# Patient Record
Sex: MALE | Race: WHITE | Employment: UNEMPLOYED | URBAN - METROPOLITAN AREA
[De-identification: names, ages, dates, MRNs, and addresses within clinical notes are randomized per-mention and may not be internally consistent; named-entity substitution may affect disease eponyms.]

---

## 2021-08-09 ENCOUNTER — HOSPITAL ENCOUNTER (EMERGENCY)
Age: 41
Discharge: HOME | End: 2021-08-10
Attending: EMERGENCY MEDICINE
Payer: MEDICARE

## 2021-08-09 DIAGNOSIS — N30.00 ACUTE CYSTITIS WITHOUT HEMATURIA: Primary | ICD-10-CM

## 2021-08-09 LAB
ALBUMIN SERPL-MCNC: 4.1 G/DL (ref 3.5–5)
ALBUMIN/GLOB SERPL: 1 {RATIO} (ref 1.2–3.5)
ALP SERPL-CCNC: 57 U/L (ref 50–136)
ALT SERPL-CCNC: 46 U/L (ref 12–65)
ANION GAP SERPL CALC-SCNC: 10 MMOL/L (ref 7–16)
AST SERPL-CCNC: 31 U/L (ref 15–37)
BASOPHILS # BLD: 0.1 K/UL (ref 0–0.2)
BASOPHILS NFR BLD: 1 % (ref 0–2)
BILIRUB SERPL-MCNC: 0.3 MG/DL (ref 0.2–1.1)
BUN SERPL-MCNC: 19 MG/DL (ref 6–23)
CALCIUM SERPL-MCNC: 10.1 MG/DL (ref 8.3–10.4)
CHLORIDE SERPL-SCNC: 98 MMOL/L (ref 98–107)
CO2 SERPL-SCNC: 24 MMOL/L (ref 21–32)
CREAT SERPL-MCNC: 1.03 MG/DL (ref 0.8–1.5)
DIFFERENTIAL METHOD BLD: NORMAL
EOSINOPHIL # BLD: 0.2 K/UL (ref 0–0.8)
EOSINOPHIL NFR BLD: 3 % (ref 0.5–7.8)
ERYTHROCYTE [DISTWIDTH] IN BLOOD BY AUTOMATED COUNT: 14.3 % (ref 11.9–14.6)
ETHANOL SERPL-MCNC: <3 MG/DL
GLOBULIN SER CALC-MCNC: 4.2 G/DL (ref 2.3–3.5)
GLUCOSE SERPL-MCNC: 319 MG/DL (ref 65–100)
HCT VFR BLD AUTO: 46.6 % (ref 41.1–50.3)
HGB BLD-MCNC: 15.4 G/DL (ref 13.6–17.2)
IMM GRANULOCYTES # BLD AUTO: 0.1 K/UL (ref 0–0.5)
IMM GRANULOCYTES NFR BLD AUTO: 1 % (ref 0–5)
LYMPHOCYTES # BLD: 1.4 K/UL (ref 0.5–4.6)
LYMPHOCYTES NFR BLD: 18 % (ref 13–44)
MCH RBC QN AUTO: 27.5 PG (ref 26.1–32.9)
MCHC RBC AUTO-ENTMCNC: 33 G/DL (ref 31.4–35)
MCV RBC AUTO: 83.2 FL (ref 79.6–97.8)
MONOCYTES # BLD: 0.5 K/UL (ref 0.1–1.3)
MONOCYTES NFR BLD: 6 % (ref 4–12)
NEUTS SEG # BLD: 5.9 K/UL (ref 1.7–8.2)
NEUTS SEG NFR BLD: 72 % (ref 43–78)
NRBC # BLD: 0 K/UL (ref 0–0.2)
PLATELET # BLD AUTO: 272 K/UL (ref 150–450)
PMV BLD AUTO: 11.7 FL (ref 9.4–12.3)
POTASSIUM SERPL-SCNC: 4.8 MMOL/L (ref 3.5–5.1)
PROT SERPL-MCNC: 8.3 G/DL (ref 6.3–8.2)
RBC # BLD AUTO: 5.6 M/UL (ref 4.23–5.6)
SODIUM SERPL-SCNC: 132 MMOL/L (ref 136–145)
WBC # BLD AUTO: 8.2 K/UL (ref 4.3–11.1)

## 2021-08-09 PROCEDURE — 80053 COMPREHEN METABOLIC PANEL: CPT

## 2021-08-09 PROCEDURE — 74011250636 HC RX REV CODE- 250/636: Performed by: EMERGENCY MEDICINE

## 2021-08-09 PROCEDURE — 81015 MICROSCOPIC EXAM OF URINE: CPT

## 2021-08-09 PROCEDURE — 96375 TX/PRO/DX INJ NEW DRUG ADDON: CPT

## 2021-08-09 PROCEDURE — 81003 URINALYSIS AUTO W/O SCOPE: CPT

## 2021-08-09 PROCEDURE — 85025 COMPLETE CBC W/AUTO DIFF WBC: CPT

## 2021-08-09 PROCEDURE — 82077 ASSAY SPEC XCP UR&BREATH IA: CPT

## 2021-08-09 PROCEDURE — 80307 DRUG TEST PRSMV CHEM ANLYZR: CPT

## 2021-08-09 PROCEDURE — 99285 EMERGENCY DEPT VISIT HI MDM: CPT

## 2021-08-09 RX ORDER — QUETIAPINE FUMARATE 25 MG/1
50 TABLET, FILM COATED ORAL
Status: DISCONTINUED | OUTPATIENT
Start: 2021-08-09 | End: 2021-08-10

## 2021-08-09 RX ORDER — CEPHALEXIN 500 MG/1
500 CAPSULE ORAL 3 TIMES DAILY
Qty: 30 CAPSULE | Refills: 0 | Status: SHIPPED | OUTPATIENT
Start: 2021-08-09 | End: 2021-08-19

## 2021-08-09 RX ORDER — LORAZEPAM 2 MG/ML
1 INJECTION INTRAMUSCULAR
Status: DISCONTINUED | OUTPATIENT
Start: 2021-08-09 | End: 2021-08-09 | Stop reason: SDUPTHER

## 2021-08-09 RX ORDER — TRAZODONE HYDROCHLORIDE 50 MG/1
150 TABLET ORAL
Status: DISCONTINUED | OUTPATIENT
Start: 2021-08-09 | End: 2021-08-10

## 2021-08-09 RX ORDER — LORAZEPAM 2 MG/ML
1 INJECTION INTRAMUSCULAR
Status: COMPLETED | OUTPATIENT
Start: 2021-08-09 | End: 2021-08-09

## 2021-08-09 RX ORDER — HYDROMORPHONE HYDROCHLORIDE 1 MG/ML
1 INJECTION, SOLUTION INTRAMUSCULAR; INTRAVENOUS; SUBCUTANEOUS
Status: DISCONTINUED | OUTPATIENT
Start: 2021-08-09 | End: 2021-08-10

## 2021-08-09 RX ORDER — MORPHINE SULFATE 4 MG/ML
4 INJECTION INTRAVENOUS
Status: COMPLETED | OUTPATIENT
Start: 2021-08-09 | End: 2021-08-09

## 2021-08-09 RX ADMIN — MORPHINE SULFATE 4 MG: 4 INJECTION INTRAVENOUS at 19:49

## 2021-08-09 RX ADMIN — LORAZEPAM 1 MG: 2 INJECTION INTRAMUSCULAR; INTRAVENOUS at 20:00

## 2021-08-09 NOTE — ED PROVIDER NOTES
41 yo M with pmhx spina bifida presents via ems from NH for increased agitation and concerns for UTI. Pt admits urinary hesitancy with history of artificial urinary sphincter. Speech tangenital. Per EMS he threw an oxygen tank at the window. Patient evaluated initially in triage. Rapid Medical Evaluation was conducted and necessary orders have been placed. I have performed a medical screening exam.  Care will now be transferred to the attending physician in the emergency department. Hafsa Reina 4:38 PM      Patient has pressured speech and is talking to the corner of the room where no one is when I walk in. The patient abruptly ignores his conversation with the corner of the room and starts talking with me about his past medical history going over 5-6 subjects within about 30 seconds. Past Medical History:   Diagnosis Date    Depression     GERD (gastroesophageal reflux disease)     Heart disease     Hypertension     Neurogenic bladder, NOS     Spina bifida ([facility] [address]     Spina bifida without mention of hydrocephalus, unspecified region [facility])     Stress incontinence, male     Toxic effect of latex(989.82)     Urinary tract infection, site not specified        Past Surgical History:   Procedure Laterality Date    HX ORTHOPAEDIC Right     foot surgery    HX UROLOGICAL      artificial urinary sphincter         No family history on file.     Social History     Socioeconomic History    Marital status: SINGLE     Spouse name: Not on file    Number of children: Not on file    Years of education: Not on file    Highest education level: Not on file   Occupational History    Not on file   Tobacco Use    Smoking status: Former Smoker    Smokeless tobacco: Current User   Substance and Sexual Activity    Alcohol use: No    Drug use: Not on file    Sexual activity: Not on file   Other Topics Concern    Not on file   Social History Narrative    Not on file     Social Determinants of Health     Financial Resource Strain:     Difficulty of Paying Living Expenses:    Food Insecurity:     Worried About Running Out of Food in the Last Year:     [address] in the Last Year:    Transportation Needs:     Lack of Transportation (Medical):  Lack of Transportation (Non-Medical):    Physical Activity:     Days of Exercise per Week:     Minutes of Exercise per Session:    Stress:     Feeling of Stress :    Social Connections:     Frequency of Communication with Friends and Family:     Frequency of Social Gatherings with Friends and Family:     Attends [employer] Services:     Active Member of Clubs or Organizations:     Attends Club or Organization Meetings:     Marital Status:    Intimate Partner Violence:     Fear of Current or Ex-Partner:     Emotionally Abused:     Physically Abused:     Sexually Abused: ALLERGIES: Latex    Review of Systems   Psychiatric/Behavioral: Positive for hallucinations. The patient is nervous/anxious and is hyperactive. All other systems reviewed and are negative. Vitals:    08/09/21 1637 08/09/21 1926 08/09/21 2300 08/09/21 2328   BP: (!) 155/95 (!) 174/101  (!) 162/89   Pulse: (!) 111 (!) 112 (!) 106    Resp: 20 18     Temp:    98.3 °F (36.8 °C)   SpO2: 97% 98% 100%    Weight: (!) 171.9 kg (379 lb)      Height: 5' 9\" (1.753 m)               Physical Exam     GENERAL:The patient has Body mass index is 55.97 kg/m². Well-hydrated. VITAL SIGNS: Heart rate, blood pressure, respiratory rate reviewed as recorded in  nurse's notes  EYES: Pupils reactive. Extraocular motion intact. No conjunctival redness or drainage. MOUTH/THROAT: Pharynx clear; airway patent. NECK: Supple, no meningeal signs. Trachea midline. No masses or thyromegaly. LUNGS: Breath sounds clear and equal bilaterally no accessory muscle use  CHEST: No deformity  CARDIOVASCULAR: Sinus tachycardia  ABDOMEN: Soft without tenderness. No palpable masses or organomegaly. No  peritoneal signs. No rigidity. EXTREMITIES: No clubbing or cyanosis. No joint swelling. Normal muscle tone. No  restricted range of motion appreciated. NEUROLOGIC: Sensation is grossly intact. Cranial nerve exam reveals face is  symmetrical, tongue is midline speech is clear. SKIN: No rash or petechiae. Good skin turgor palpated. PSYCHIATRIC: Alert and oriented. Pressured speech and concerning findings for hallucinations patient is talking rapidly full conversation with the corner of the room. MDM  Number of Diagnoses or Management Options  Diagnosis management comments: Suicidal ideations, homicidal ideations, self-inflicted injury,    Schizophrenia, schizoaffective disorder, personality disorder, major depression,   depression with anxiety, depression reactive to situation, depression, anxiety, panic  attack, hyperventilation syndrome, bipolar disorder,    Substance abuse, medication noncompliance, drug abuse, alcohol abuse, alcohol  intoxication,    Eating disorder, bulimia nervosa, anorexia nervosa, adjustment reaction         Amount and/or Complexity of Data Reviewed  Clinical lab tests: reviewed and ordered  Tests in the radiology section of CPT®: ordered and reviewed  Tests in the medicine section of CPT®: ordered and reviewed  Review and summarize past medical records: yes  Independent visualization of images, tracings, or specimens: yes      ED Course as of Aug 10 0031   [facility] Aug 09, 2021   2102 Patient is much more calm at this time. A pediatric  urine catch bag has been placed over his groin and we are still waiting on the urine for evaluation of possible urinary tract infection. [ZL]   7221 Patient has been given dose of IV Rocephin secondary to his findings on urinalysis. The patient will be given his evening doses of Seroquel trazodone and another dose of pain medicine since he has missed all of these being here in the emergency department.   Plan will be to discharge him back to his facility this evening.    [KH]   5469 We have tried calling the facility where he came from leaving several messages but have not heard back from them.   Because patient has improved with interventions here in the emergency department and there is no evidence of sepsis I do not feel as though he needs to be admitted to the hospital.    [facility]      ED Course User Index  [KH] Leeland Hammans,        Procedures

## 2021-08-09 NOTE — ED TRIAGE NOTES
Pt to ER with EMS after staff states pt may have UTI and broke window with oxygen tank. Pt lives at [address] [facility] home due to multiple health issues. Pt is very talkative in triage. Masked in triage.

## 2021-08-10 VITALS
OXYGEN SATURATION: 100 % | BODY MASS INDEX: 46.65 KG/M2 | TEMPERATURE: 98.3 F | DIASTOLIC BLOOD PRESSURE: 89 MMHG | HEIGHT: 69 IN | SYSTOLIC BLOOD PRESSURE: 162 MMHG | WEIGHT: 315 LBS | RESPIRATION RATE: 18 BRPM | HEART RATE: 114 BPM

## 2021-08-10 LAB
AMPHET UR QL SCN: NEGATIVE
BACTERIA URNS QL MICRO: NORMAL /HPF
BARBITURATES UR QL SCN: NEGATIVE
BENZODIAZ UR QL: NEGATIVE
CANNABINOIDS UR QL SCN: NEGATIVE
CASTS URNS QL MICRO: NORMAL /LPF
COCAINE UR QL SCN: NEGATIVE
CRYSTALS URNS QL MICRO: 0 /LPF
EPI CELLS #/AREA URNS HPF: NORMAL /HPF
METHADONE UR QL: NEGATIVE
MUCOUS THREADS URNS QL MICRO: 0 /LPF
OPIATES UR QL: POSITIVE
OTHER OBSERVATIONS,UCOM: NORMAL
PCP UR QL: NEGATIVE
RBC #/AREA URNS HPF: NORMAL /HPF
WBC URNS QL MICRO: NORMAL /HPF
YEAST URNS QL MICRO: NORMAL

## 2021-08-10 PROCEDURE — 96376 TX/PRO/DX INJ SAME DRUG ADON: CPT

## 2021-08-10 PROCEDURE — 74011000258 HC RX REV CODE- 258: Performed by: EMERGENCY MEDICINE

## 2021-08-10 PROCEDURE — 74011250636 HC RX REV CODE- 250/636: Performed by: EMERGENCY MEDICINE

## 2021-08-10 PROCEDURE — 74011250637 HC RX REV CODE- 250/637: Performed by: EMERGENCY MEDICINE

## 2021-08-10 PROCEDURE — 96365 THER/PROPH/DIAG IV INF INIT: CPT

## 2021-08-10 RX ORDER — MORPHINE SULFATE 10 MG/ML
10 INJECTION, SOLUTION INTRAMUSCULAR; INTRAVENOUS
Status: COMPLETED | OUTPATIENT
Start: 2021-08-10 | End: 2021-08-10

## 2021-08-10 RX ORDER — LORAZEPAM 2 MG/ML
2 INJECTION INTRAMUSCULAR
Status: COMPLETED | OUTPATIENT
Start: 2021-08-10 | End: 2021-08-10

## 2021-08-10 RX ADMIN — LORAZEPAM 2 MG: 2 INJECTION INTRAMUSCULAR; INTRAVENOUS at 00:46

## 2021-08-10 RX ADMIN — CEFTRIAXONE 1 G: 1 INJECTION, POWDER, FOR SOLUTION INTRAMUSCULAR; INTRAVENOUS at 00:12

## 2021-08-10 RX ADMIN — MORPHINE SULFATE 10 MG: 10 INJECTION INTRAVENOUS at 00:46

## 2021-08-10 NOTE — ED NOTES
Pt refusing trazadone, dilaudid, and seroquel. md notifed. Pt agreeable to antibiotics. Awaiting additional orders.

## 2021-08-10 NOTE — DISCHARGE INSTRUCTIONS
Take the antibiotics as prescribed. Make sure that the facility is giving you all of your medications and asked to speak to your primary doctor at the facility this week for repeat evaluation.

## 2022-05-20 ENCOUNTER — HOSPITAL ENCOUNTER (INPATIENT)
Age: 42
LOS: 1 days | Discharge: ANOTHER HOSPITAL | DRG: 872 | End: 2022-05-21
Attending: EMERGENCY MEDICINE | Admitting: INTERNAL MEDICINE
Payer: MEDICARE

## 2022-05-20 ENCOUNTER — HOSPITAL ENCOUNTER (INPATIENT)
Age: 42
LOS: 8 days | Discharge: NURSING, REHAB OR LONG-TERM CARE | DRG: 871 | End: 2022-05-28
Attending: EMERGENCY MEDICINE | Admitting: INTERNAL MEDICINE
Payer: MEDICARE

## 2022-05-20 ENCOUNTER — APPOINTMENT (OUTPATIENT)
Dept: CT IMAGING | Age: 42
DRG: 872 | End: 2022-05-20
Attending: NURSE PRACTITIONER
Payer: MEDICARE

## 2022-05-20 ENCOUNTER — APPOINTMENT (OUTPATIENT)
Dept: GENERAL RADIOLOGY | Age: 42
DRG: 872 | End: 2022-05-20
Attending: EMERGENCY MEDICINE
Payer: MEDICARE

## 2022-05-20 VITALS
DIASTOLIC BLOOD PRESSURE: 54 MMHG | RESPIRATION RATE: 30 BRPM | SYSTOLIC BLOOD PRESSURE: 109 MMHG | WEIGHT: 315 LBS | HEIGHT: 69 IN | HEART RATE: 132 BPM | BODY MASS INDEX: 46.65 KG/M2 | OXYGEN SATURATION: 99 % | TEMPERATURE: 103.3 F

## 2022-05-20 DIAGNOSIS — N39.0 URINARY TRACT INFECTION WITH HEMATURIA, SITE UNSPECIFIED: Primary | ICD-10-CM

## 2022-05-20 DIAGNOSIS — N39.0 URINARY TRACT INFECTION WITH HEMATURIA, SITE UNSPECIFIED: ICD-10-CM

## 2022-05-20 DIAGNOSIS — N17.9 AKI (ACUTE KIDNEY INJURY) (HCC): Primary | ICD-10-CM

## 2022-05-20 DIAGNOSIS — J18.9 PNEUMONIA DUE TO INFECTIOUS ORGANISM, UNSPECIFIED LATERALITY, UNSPECIFIED PART OF LUNG: ICD-10-CM

## 2022-05-20 DIAGNOSIS — R31.9 URINARY TRACT INFECTION WITH HEMATURIA, SITE UNSPECIFIED: Primary | ICD-10-CM

## 2022-05-20 DIAGNOSIS — R31.9 URINARY TRACT INFECTION WITH HEMATURIA, SITE UNSPECIFIED: ICD-10-CM

## 2022-05-20 DIAGNOSIS — J18.9 PNEUMONIA OF LEFT LUNG DUE TO INFECTIOUS ORGANISM, UNSPECIFIED PART OF LUNG: ICD-10-CM

## 2022-05-20 PROBLEM — D69.6 THROMBOCYTOPENIA (HCC): Status: ACTIVE | Noted: 2022-05-20

## 2022-05-20 PROBLEM — I10 HTN (HYPERTENSION): Status: ACTIVE | Noted: 2022-05-20

## 2022-05-20 PROBLEM — E66.01 SEVERE OBESITY (BMI 35.0-35.9 WITH COMORBIDITY) (HCC): Status: ACTIVE | Noted: 2022-05-20

## 2022-05-20 PROBLEM — Z89.511 HX OF RIGHT BKA (HCC): Status: ACTIVE | Noted: 2022-05-20

## 2022-05-20 PROBLEM — E66.01 MORBID OBESITY WITH BMI OF 50.0-59.9, ADULT (HCC): Status: ACTIVE | Noted: 2022-05-20

## 2022-05-20 PROBLEM — K21.9 GERD (GASTROESOPHAGEAL REFLUX DISEASE): Status: ACTIVE | Noted: 2022-05-20

## 2022-05-20 PROBLEM — R65.20 SEVERE SEPSIS (HCC): Status: ACTIVE | Noted: 2022-05-20

## 2022-05-20 PROBLEM — Q05.9 SPINA BIFIDA (HCC): Status: ACTIVE | Noted: 2022-05-20

## 2022-05-20 PROBLEM — R00.0 SINUS TACHYCARDIA: Status: ACTIVE | Noted: 2022-05-20

## 2022-05-20 PROBLEM — G93.41 ACUTE METABOLIC ENCEPHALOPATHY: Status: ACTIVE | Noted: 2022-05-20

## 2022-05-20 PROBLEM — A41.9 SEPSIS (HCC): Status: ACTIVE | Noted: 2022-05-20

## 2022-05-20 PROBLEM — R50.9 FEVER: Status: ACTIVE | Noted: 2022-05-20

## 2022-05-20 PROBLEM — K76.0 HEPATIC STEATOSIS: Status: ACTIVE | Noted: 2022-05-20

## 2022-05-20 PROBLEM — A41.9 SEVERE SEPSIS (HCC): Status: ACTIVE | Noted: 2022-05-20

## 2022-05-20 PROBLEM — D64.9 NORMOCYTIC ANEMIA: Status: ACTIVE | Noted: 2022-05-20

## 2022-05-20 LAB
ALBUMIN SERPL-MCNC: 2.6 G/DL (ref 3.5–5)
ALBUMIN/GLOB SERPL: 0.6 {RATIO} (ref 1.2–3.5)
ALP SERPL-CCNC: 34 U/L (ref 50–136)
ALT SERPL-CCNC: 21 U/L (ref 12–65)
AMMONIA PLAS-SCNC: 15 UMOL/L (ref 11–32)
ANION GAP SERPL CALC-SCNC: 10 MMOL/L (ref 7–16)
APPEARANCE UR: ABNORMAL
ARTERIAL PATENCY WRIST A: POSITIVE
AST SERPL-CCNC: 13 U/L (ref 15–37)
B PERT DNA SPEC QL NAA+PROBE: NOT DETECTED
BACTERIA URNS QL MICRO: ABNORMAL /HPF
BASE DEFICIT BLD-SCNC: 3.4 MMOL/L
BASOPHILS # BLD: 0 K/UL (ref 0–0.2)
BASOPHILS NFR BLD: 0 % (ref 0–2)
BDY SITE: ABNORMAL
BILIRUB SERPL-MCNC: 0.4 MG/DL (ref 0.2–1.1)
BILIRUB UR QL: NEGATIVE
BORDETELLA PARAPERTUSSIS PCR, BORPAR: NOT DETECTED
BUN SERPL-MCNC: 35 MG/DL (ref 6–23)
C PNEUM DNA SPEC QL NAA+PROBE: NOT DETECTED
CALCIUM SERPL-MCNC: 8.7 MG/DL (ref 8.3–10.4)
CASTS URNS QL MICRO: ABNORMAL /LPF
CHLORIDE SERPL-SCNC: 96 MMOL/L (ref 98–107)
CO2 SERPL-SCNC: 25 MMOL/L (ref 21–32)
COLOR UR: YELLOW
CREAT SERPL-MCNC: 3.1 MG/DL (ref 0.8–1.5)
CRYSTALS URNS QL MICRO: 0 /LPF
DIFFERENTIAL METHOD BLD: ABNORMAL
EOSINOPHIL # BLD: 0 K/UL (ref 0–0.8)
EOSINOPHIL NFR BLD: 0 % (ref 0.5–7.8)
EPI CELLS #/AREA URNS HPF: ABNORMAL /HPF
ERYTHROCYTE [DISTWIDTH] IN BLOOD BY AUTOMATED COUNT: 14.4 % (ref 11.9–14.6)
FLUAV SUBTYP SPEC NAA+PROBE: NOT DETECTED
FLUBV RNA SPEC QL NAA+PROBE: NOT DETECTED
GAS FLOW.O2 O2 DELIVERY SYS: ABNORMAL L/MIN
GLOBULIN SER CALC-MCNC: 4.6 G/DL (ref 2.3–3.5)
GLUCOSE SERPL-MCNC: 267 MG/DL (ref 65–100)
GLUCOSE UR STRIP.AUTO-MCNC: 250 MG/DL
HADV DNA SPEC QL NAA+PROBE: NOT DETECTED
HCO3 BLD-SCNC: 21 MMOL/L (ref 22–26)
HCOV 229E RNA SPEC QL NAA+PROBE: NOT DETECTED
HCOV HKU1 RNA SPEC QL NAA+PROBE: NOT DETECTED
HCOV NL63 RNA SPEC QL NAA+PROBE: NOT DETECTED
HCOV OC43 RNA SPEC QL NAA+PROBE: NOT DETECTED
HCT VFR BLD AUTO: 33.8 % (ref 41.1–50.3)
HGB BLD-MCNC: 10.7 G/DL (ref 13.6–17.2)
HGB UR QL STRIP: ABNORMAL
HMPV RNA SPEC QL NAA+PROBE: NOT DETECTED
HPIV1 RNA SPEC QL NAA+PROBE: NOT DETECTED
HPIV2 RNA SPEC QL NAA+PROBE: NOT DETECTED
HPIV3 RNA SPEC QL NAA+PROBE: NOT DETECTED
HPIV4 RNA SPEC QL NAA+PROBE: NOT DETECTED
IMM GRANULOCYTES # BLD AUTO: 0.1 K/UL (ref 0–0.5)
IMM GRANULOCYTES NFR BLD AUTO: 1 % (ref 0–5)
KETONES UR QL STRIP.AUTO: NEGATIVE MG/DL
LACTATE SERPL-SCNC: 2 MMOL/L (ref 0.4–2)
LEUKOCYTE ESTERASE UR QL STRIP.AUTO: ABNORMAL
LYMPHOCYTES # BLD: 1.3 K/UL (ref 0.5–4.6)
LYMPHOCYTES NFR BLD: 13 % (ref 13–44)
M PNEUMO DNA SPEC QL NAA+PROBE: NOT DETECTED
MCH RBC QN AUTO: 26.3 PG (ref 26.1–32.9)
MCHC RBC AUTO-ENTMCNC: 31.7 G/DL (ref 31.4–35)
MCV RBC AUTO: 83 FL (ref 79.6–97.8)
MONOCYTES # BLD: 1.3 K/UL (ref 0.1–1.3)
MONOCYTES NFR BLD: 13 % (ref 4–12)
MUCOUS THREADS URNS QL MICRO: 0 /LPF
NEUTS SEG # BLD: 7.4 K/UL (ref 1.7–8.2)
NEUTS SEG NFR BLD: 73 % (ref 43–78)
NITRITE UR QL STRIP.AUTO: NEGATIVE
NRBC # BLD: 0 K/UL (ref 0–0.2)
OTHER OBSERVATIONS,UCOM: ABNORMAL
PCO2 BLD: 34.6 MMHG (ref 35–45)
PH BLD: 7.39 [PH] (ref 7.35–7.45)
PH UR STRIP: 5.5 [PH] (ref 5–9)
PLATELET # BLD AUTO: 142 K/UL (ref 150–450)
PMV BLD AUTO: 10.8 FL (ref 9.4–12.3)
PO2 BLD: 72 MMHG (ref 75–100)
POTASSIUM SERPL-SCNC: 3.9 MMOL/L (ref 3.5–5.1)
PROT SERPL-MCNC: 7.2 G/DL (ref 6.3–8.2)
PROT UR STRIP-MCNC: 100 MG/DL
RBC # BLD AUTO: 4.07 M/UL (ref 4.23–5.6)
RBC #/AREA URNS HPF: ABNORMAL /HPF
RSV RNA SPEC QL NAA+PROBE: NOT DETECTED
RV+EV RNA SPEC QL NAA+PROBE: NOT DETECTED
SAO2 % BLD: 94.4 % (ref 95–98)
SARS-COV-2 PCR, COVPCR: NOT DETECTED
SERVICE CMNT-IMP: ABNORMAL
SODIUM SERPL-SCNC: 131 MMOL/L (ref 138–145)
SP GR UR REFRACTOMETRY: >1.03 (ref 1–1.02)
SPECIMEN TYPE: ABNORMAL
UROBILINOGEN UR QL STRIP.AUTO: 0.2 EU/DL (ref 0.2–1)
WBC # BLD AUTO: 10.2 K/UL (ref 4.3–11.1)
WBC URNS QL MICRO: >100 /HPF

## 2022-05-20 PROCEDURE — 83605 ASSAY OF LACTIC ACID: CPT

## 2022-05-20 PROCEDURE — 74011250636 HC RX REV CODE- 250/636: Performed by: NURSE PRACTITIONER

## 2022-05-20 PROCEDURE — 96360 HYDRATION IV INFUSION INIT: CPT

## 2022-05-20 PROCEDURE — 9990 CHARGE CONVERSION

## 2022-05-20 PROCEDURE — 87040 BLOOD CULTURE FOR BACTERIA: CPT

## 2022-05-20 PROCEDURE — 99285 EMERGENCY DEPT VISIT HI MDM: CPT

## 2022-05-20 PROCEDURE — 81003 URINALYSIS AUTO W/O SCOPE: CPT

## 2022-05-20 PROCEDURE — 96361 HYDRATE IV INFUSION ADD-ON: CPT

## 2022-05-20 PROCEDURE — 93005 ELECTROCARDIOGRAM TRACING: CPT

## 2022-05-20 PROCEDURE — 0202U NFCT DS 22 TRGT SARS-COV-2: CPT

## 2022-05-20 PROCEDURE — 83036 HEMOGLOBIN GLYCOSYLATED A1C: CPT

## 2022-05-20 PROCEDURE — 87186 SC STD MICRODIL/AGAR DIL: CPT

## 2022-05-20 PROCEDURE — 84145 PROCALCITONIN (PCT): CPT

## 2022-05-20 PROCEDURE — 70450 CT HEAD/BRAIN W/O DYE: CPT

## 2022-05-20 PROCEDURE — 94762 N-INVAS EAR/PLS OXIMTRY CONT: CPT

## 2022-05-20 PROCEDURE — 82803 BLOOD GASES ANY COMBINATION: CPT

## 2022-05-20 PROCEDURE — 74011250637 HC RX REV CODE- 250/637: Performed by: NURSE PRACTITIONER

## 2022-05-20 PROCEDURE — 85025 COMPLETE CBC W/AUTO DIFF WBC: CPT

## 2022-05-20 PROCEDURE — 93005 ELECTROCARDIOGRAM TRACING: CPT | Performed by: EMERGENCY MEDICINE

## 2022-05-20 PROCEDURE — 74011000258 HC RX REV CODE- 258: Performed by: NURSE PRACTITIONER

## 2022-05-20 PROCEDURE — 36600 WITHDRAWAL OF ARTERIAL BLOOD: CPT

## 2022-05-20 PROCEDURE — 82140 ASSAY OF AMMONIA: CPT

## 2022-05-20 PROCEDURE — 87205 SMEAR GRAM STAIN: CPT

## 2022-05-20 PROCEDURE — 87086 URINE CULTURE/COLONY COUNT: CPT

## 2022-05-20 PROCEDURE — 80053 COMPREHEN METABOLIC PANEL: CPT

## 2022-05-20 PROCEDURE — 87088 URINE BACTERIA CULTURE: CPT

## 2022-05-20 PROCEDURE — 71045 X-RAY EXAM CHEST 1 VIEW: CPT

## 2022-05-20 PROCEDURE — 96365 THER/PROPH/DIAG IV INF INIT: CPT

## 2022-05-20 PROCEDURE — 81015 MICROSCOPIC EXAM OF URINE: CPT

## 2022-05-20 PROCEDURE — 65270000029 HC RM PRIVATE

## 2022-05-20 PROCEDURE — 87077 CULTURE AEROBIC IDENTIFY: CPT

## 2022-05-20 PROCEDURE — 74011250636 HC RX REV CODE- 250/636: Performed by: EMERGENCY MEDICINE

## 2022-05-20 RX ORDER — SODIUM CHLORIDE 0.9 % (FLUSH) 0.9 %
5-40 SYRINGE (ML) INJECTION AS NEEDED
Status: DISCONTINUED | OUTPATIENT
Start: 2022-05-20 | End: 2022-05-21 | Stop reason: HOSPADM

## 2022-05-20 RX ORDER — ACETAMINOPHEN 650 MG/1
650 SUPPOSITORY RECTAL
Status: DISCONTINUED | OUTPATIENT
Start: 2022-05-20 | End: 2022-05-21 | Stop reason: HOSPADM

## 2022-05-20 RX ORDER — BUSPIRONE HYDROCHLORIDE 10 MG/1
30 TABLET ORAL DAILY
Status: DISCONTINUED | OUTPATIENT
Start: 2022-05-21 | End: 2022-05-21 | Stop reason: HOSPADM

## 2022-05-20 RX ORDER — FAMOTIDINE 20 MG/1
20 TABLET, FILM COATED ORAL 2 TIMES DAILY
Status: DISCONTINUED | OUTPATIENT
Start: 2022-05-21 | End: 2022-05-21 | Stop reason: HOSPADM

## 2022-05-20 RX ORDER — ENOXAPARIN SODIUM 100 MG/ML
40 INJECTION SUBCUTANEOUS EVERY 12 HOURS
Status: DISCONTINUED | OUTPATIENT
Start: 2022-05-20 | End: 2022-05-21 | Stop reason: HOSPADM

## 2022-05-20 RX ORDER — SODIUM CHLORIDE 0.9 % (FLUSH) 0.9 %
5-10 SYRINGE (ML) INJECTION EVERY 8 HOURS
Status: DISCONTINUED | OUTPATIENT
Start: 2022-05-20 | End: 2022-05-21 | Stop reason: HOSPADM

## 2022-05-20 RX ORDER — SODIUM CHLORIDE 9 MG/ML
100 INJECTION, SOLUTION INTRAVENOUS CONTINUOUS
Status: DISCONTINUED | OUTPATIENT
Start: 2022-05-20 | End: 2022-05-21 | Stop reason: HOSPADM

## 2022-05-20 RX ORDER — ACETAMINOPHEN 325 MG/1
650 TABLET ORAL
Status: DISCONTINUED | OUTPATIENT
Start: 2022-05-20 | End: 2022-05-21 | Stop reason: HOSPADM

## 2022-05-20 RX ORDER — SODIUM CHLORIDE 0.9 % (FLUSH) 0.9 %
5-10 SYRINGE (ML) INJECTION AS NEEDED
Status: DISCONTINUED | OUTPATIENT
Start: 2022-05-20 | End: 2022-05-21 | Stop reason: HOSPADM

## 2022-05-20 RX ORDER — POLYETHYLENE GLYCOL 3350 17 G/17G
17 POWDER, FOR SOLUTION ORAL DAILY PRN
Status: DISCONTINUED | OUTPATIENT
Start: 2022-05-20 | End: 2022-05-21 | Stop reason: HOSPADM

## 2022-05-20 RX ORDER — ACETAMINOPHEN 650 MG/1
975 SUPPOSITORY RECTAL
Status: COMPLETED | OUTPATIENT
Start: 2022-05-20 | End: 2022-05-20

## 2022-05-20 RX ORDER — ONDANSETRON 4 MG/1
4 TABLET, ORALLY DISINTEGRATING ORAL
Status: DISCONTINUED | OUTPATIENT
Start: 2022-05-20 | End: 2022-05-21 | Stop reason: HOSPADM

## 2022-05-20 RX ORDER — BUPROPION HYDROCHLORIDE 150 MG/1
150 TABLET, EXTENDED RELEASE ORAL 2 TIMES DAILY
Status: DISCONTINUED | OUTPATIENT
Start: 2022-05-21 | End: 2022-05-21 | Stop reason: HOSPADM

## 2022-05-20 RX ORDER — SODIUM CHLORIDE, SODIUM LACTATE, POTASSIUM CHLORIDE, CALCIUM CHLORIDE 600; 310; 30; 20 MG/100ML; MG/100ML; MG/100ML; MG/100ML
1000 INJECTION, SOLUTION INTRAVENOUS
Status: COMPLETED | OUTPATIENT
Start: 2022-05-20 | End: 2022-05-20

## 2022-05-20 RX ORDER — ONDANSETRON 2 MG/ML
4 INJECTION INTRAMUSCULAR; INTRAVENOUS
Status: DISCONTINUED | OUTPATIENT
Start: 2022-05-20 | End: 2022-05-21 | Stop reason: HOSPADM

## 2022-05-20 RX ORDER — SODIUM CHLORIDE 0.9 % (FLUSH) 0.9 %
5-40 SYRINGE (ML) INJECTION EVERY 8 HOURS
Status: DISCONTINUED | OUTPATIENT
Start: 2022-05-20 | End: 2022-05-21 | Stop reason: HOSPADM

## 2022-05-20 RX ADMIN — SODIUM CHLORIDE, POTASSIUM CHLORIDE, SODIUM LACTATE AND CALCIUM CHLORIDE 1000 ML: 600; 310; 30; 20 INJECTION, SOLUTION INTRAVENOUS at 18:03

## 2022-05-20 RX ADMIN — SODIUM CHLORIDE 1000 ML: 9 INJECTION, SOLUTION INTRAVENOUS at 20:46

## 2022-05-20 RX ADMIN — ACETAMINOPHEN 975 MG: 650 SUPPOSITORY RECTAL at 19:22

## 2022-05-20 RX ADMIN — CEFTRIAXONE 1 G: 1 INJECTION, POWDER, FOR SOLUTION INTRAMUSCULAR; INTRAVENOUS at 20:20

## 2022-05-20 NOTE — Clinical Note
Status[de-identified] INPATIENT [101]   Type of Bed: Remote Telemetry [29]   Cardiac Monitoring Required?: Yes   Inpatient Hospitalization Certified Necessary for the Following Reasons: 3.  Patient receiving treatment that can only be provided in an inpatient setting (further clarification in H&P documentation)   Admitting Diagnosis: Sepsis [facility]) [[id number]]   Admitting Physician: Cat Mckee [[id number]]   Attending Physician: Cat Mckee [[id number]]   Estimated Length of Stay: 3-4 Midnights   Discharge Plan[de-identified] Home with Office Follow-up

## 2022-05-20 NOTE — ED TRIAGE NOTES
[facility] and sepsis alert. Pt is a/o x 1 to self. EMS VS: temp 98.4 rr 28 bp 90/Palp . Pt took tylenol at facility. Blood cx drawn en route. 3.375 g of zosyn given. 20G Left hand  Pt using profanity during triage to this RN and not wanting to answer triage questions.

## 2022-05-20 NOTE — ED PROVIDER NOTES
[employer] Emergency Department Provider Note                     PCP:                Vera Garrido MD               Age: 36 y.o. Sex: M       MDM  Number of Diagnoses or Management Options  MECCA (acute kidney injury) ([facility] [address]): new, needed workup  Pneumonia of left lung due to infectious organism, unspecified part of lung: new, needed workup  Urinary tract infection with hematuria, site unspecified: new, needed workup     Amount and/or Complexity of Data Reviewed  Clinical lab tests: ordered and reviewed  Tests in the radiology section of CPT®: ordered  Tests in the medicine section of CPT®: reviewed and ordered  Discussion of test results with the performing providers: yes  Decide to obtain previous medical records or to obtain history from someone other than the patient: yes  Review and summarize past medical records: yes  Discuss the patient with other providers: yes    Risk of Complications, Morbidity, and/or Mortality  Presenting problems: high  Diagnostic procedures: high  Management options: high        I evaluated this patient promptly upon request by ED RN, who evaluated patient upon room placement. I discussed the patient with ED attending promptly upon my initial evaluation. As in HPI. Well appearing, but with concern for sepsis, other emergent process. Results reviewed, meds as charted. Discussed with ED attending. Admitted by Hospitalist, thank you. Orders Placed This Encounter    BLOOD CULTURE    BLOOD CULTURE    XR CHEST PORT    LACTIC ACID    LACTIC ACID    CBC WITH DIFF    CMP    POC URINE DIPSTICK    NURSING-MISCELLANEOUS: IF PATIENT IS UNABLE TO PROVIDE URINE PLEASE PERFORM STRAIGHT CATHETERIZATION. IF PATIENT IS UNABLE TO PROVIDE URINE PLEASE PERFORM STRAIGHT CATHETERIZATION.   ONE TIME    CARDIAC MONITOR - ED ONLY    PULSE OXIMETRY CONTINUOUS    MEASURE RECTAL TEMPERATURE    INTAKE AND OUTPUT    EKG, 12 LEAD, INITIAL    SALINE LOCK IV ONE TIME Routine    sodium chloride (NS) flush 5-10 mL    sodium chloride (NS) flush 5-10 mL    lactated Ringers infusion 1,000 mL    acetaminophen (TYLENOL) suppository 975 mg        Marcial Bee is a 36 y.o. male who presents to the Emergency Department with chief complaint of    Chief Complaint   Patient presents with    Altered mental status      HPI  56-year-old male in the ED by EMS with concern for sepsis. Patient has a history of spina bifida, hypertension, neurogenic bladder, stress incontinence, UTI, depression. EMS states they were called because the patient seemed lethargic in the past couple of days, states that he was noted to have a temperature of 102 °F this afternoon. At that point they called EMS. Patient's triage vital signs are largely reassuring, afebrile, but tachycardic. Found the patient seated upright sleeping, easily arousable to verbal stimuli. Patient is alert and appropriately oriented, answers questions appropriately. States he is felt fatigued in recent days and thinks he has urinary tract infection, endorsing increased urinary frequency and malodorous urine. He denies all other complaints. Review of Systems   All other systems reviewed and are negative. Past Medical History:   Diagnosis Date    Depression     GERD (gastroesophageal reflux disease)     Heart disease     Hypertension     Neurogenic bladder, NOS     Spina bifida ([facility] [address]     Spina bifida without mention of hydrocephalus, unspecified region     Stress incontinence, male     Toxic effect of latex(989.82)     Urinary tract infection, site not specified         Past Surgical History:   Procedure Laterality Date    HX ORTHOPAEDIC Right     foot surgery    HX UROLOGICAL      artificial urinary sphincter       History reviewed. No pertinent family history.         Social Connections:     Frequency of Communication with Friends and Family: Not on file    Frequency of Social Gatherings with Friends and Family: Not on file    Attends Latter-day Services: Not on file    Active Member of Clubs or Organizations: Not on file    Attends Club or Organization Meetings: Not on file    Marital Status: Not on file        Allergies   Allergen Reactions    Latex Unknown (comments)        Vitals signs and nursing note reviewed. Patient Vitals for the past 4 hrs:   Temp Pulse Resp BP SpO2   05/20/22 1844 -- (!) 130 24 (!) 93/57 96 %   05/20/22 1804 -- (!) 130 -- (!) 101/58 97 %   05/20/22 1729 -- -- -- -- 96 %   05/20/22 1729 -- (!) 127 27 (!) 102/44 --   05/20/22 1644 99 °F (37.2 °C) (!) 123 20 (!) 123/99 96 %          Physical Exam   Constitutional: Oriented to person, place, and time. Appears well-developed and well-nourished. No distress. HENT:    Head: Normocephalic and atraumatic   Right Ear: External ear normal.    Left Ear: External ear normal.     Nose: Nose normal.   Mouth/Throat: Mouth normal.    Eyes: Conjunctivae are normal.   Neck: Supple. No tracheal deviation. Cardiovascular: Normal rate, intact distal pulses. Brisk capillary refill intact, less than 2 seconds. Regular rhythm present. No pitting edema. Pulmonary/Chest: Lungs are clear & equal bilaterally. No adventitious sounds. No adventitious sounds. No respiratory distress. Abdominal: Soft. There is no tenderness. : Androgenous genitalia. Musculoskeletal: No obvious deformity, erythema, edema. Neurological: Alert and oriented to person, place, and time. No numbness/tingling. No loss of sensation. Positive PMS ×4. GCS= 15. Skin: Skin is warm and dry. Capillary refill takes less than 2 seconds. No abrasion, no lesion, no petechiae and no rash noted. Not diaphoretic. No cyanosis, erythema, or pallor. Psychiatric: Normal mood and affect. Behavior is normal.    Nursing note and vitals reviewed.          Procedures    Results for orders placed or performed during the hospital encounter of 05/20/22   XR CHEST PORT    Narrative    CHEST X-RAY, single portable view  5/20/2022    History: Altered mental status. Needs series criteria. Technique: Single frontal view of the chest.    Comparison: None    Findings:   Today's study is limited by patient body habitus as well as the hypoaerated  appearance of the lungs. The cardiac silhouette is not clearly enlarged. The  lungs are expanded without evidence for pneumothorax. Asymmetric opacity seen  over the left hemithorax raising concern for an asymmetric left-sided  infiltrate. No clear pleural effusion is demonstrated. Impression    1. Suggested left lung infiltrate. Pneumonia is not excluded. This report was made using voice transcription. Despite my best efforts to avoid  any, transcription errors may persist. If there is any question about the  accuracy of the report or need for clarification, then please call [phone], or text me through [employer] for clarification or correction. LACTIC ACID   Result Value Ref Range    Lactic acid 2.0 0.4 - 2.0 MMOL/L   CBC WITH AUTOMATED DIFF   Result Value Ref Range    WBC 10.2 4.3 - 11.1 K/uL    RBC 4.07 (L) 4.23 - 5.6 M/uL    HGB 10.7 (L) 13.6 - 17.2 g/dL    HCT 33.8 (L) 41.1 - 50.3 %    MCV 83.0 79.6 - 97.8 FL    MCH 26.3 26.1 - 32.9 PG    MCHC 31.7 31.4 - 35.0 g/dL    RDW 14.4 11.9 - 14.6 %    PLATELET 951 (L) 670 - 450 K/uL    MPV 10.8 9.4 - 12.3 FL    ABSOLUTE NRBC 0.00 0.0 - 0.2 K/uL    DF AUTOMATED      NEUTROPHILS 73 43 - 78 %    LYMPHOCYTES 13 13 - 44 %    MONOCYTES 13 (H) 4.0 - 12.0 %    EOSINOPHILS 0 (L) 0.5 - 7.8 %    BASOPHILS 0 0.0 - 2.0 %    IMMATURE GRANULOCYTES 1 0.0 - 5.0 %    ABS. NEUTROPHILS 7.4 1.7 - 8.2 K/UL    ABS. LYMPHOCYTES 1.3 0.5 - 4.6 K/UL    ABS. MONOCYTES 1.3 0.1 - 1.3 K/UL    ABS. EOSINOPHILS 0.0 0.0 - 0.8 K/UL    ABS. BASOPHILS 0.0 0.0 - 0.2 K/UL    ABS. IMM.  GRANS. 0.1 0.0 - 0.5 K/UL   METABOLIC PANEL, COMPREHENSIVE   Result Value Ref Range    Sodium 131 (L) 138 - 145 mmol/L    Potassium 3.9 3.5 - 5.1 mmol/L    Chloride 96 (L) 98 - 107 mmol/L    CO2 25 21 - 32 mmol/L    Anion gap 10 7 - 16 mmol/L    Glucose 267 (H) 65 - 100 mg/dL    BUN 35 (H) 6 - 23 MG/DL    Creatinine 3.10 (H) 0.8 - 1.5 MG/DL    GFR est AA 28 (L) >60 ml/min/1.73m2    GFR est non-AA 23 (L) >60 ml/min/1.73m2    Calcium 8.7 8.3 - 10.4 MG/DL    Bilirubin, total 0.4 0.2 - 1.1 MG/DL    ALT (SGPT) 21 12 - 65 U/L    AST (SGOT) 13 (L) 15 - 37 U/L    Alk. phosphatase 34 (L) 50 - 136 U/L    Protein, total 7.2 6.3 - 8.2 g/dL    Albumin 2.6 (L) 3.5 - 5.0 g/dL    Globulin 4.6 (H) 2.3 - 3.5 g/dL    A-G Ratio 0.6 (L) 1.2 - 3.5          XR CHEST PORT   Final Result   1. Suggested left lung infiltrate. Pneumonia is not excluded. This report was made using voice transcription. Despite my best efforts to avoid   any, transcription errors may persist. If there is any question about the   accuracy of the report or need for clarification, then please call [phone], or text me through [employer] for clarification or correction. Voice dictation software was used during the making of this note. This software is not perfect and grammatical and other typographical errors may be present. This note has not been completely proofread for errors.

## 2022-05-21 ENCOUNTER — APPOINTMENT (OUTPATIENT)
Dept: CT IMAGING | Age: 42
DRG: 871 | End: 2022-05-21
Payer: MEDICARE

## 2022-05-21 ENCOUNTER — APPOINTMENT (OUTPATIENT)
Dept: ULTRASOUND IMAGING | Age: 42
DRG: 871 | End: 2022-05-21
Payer: MEDICARE

## 2022-05-21 PROBLEM — N17.9 AKI (ACUTE KIDNEY INJURY) (HCC): Status: ACTIVE | Noted: 2022-05-20

## 2022-05-21 PROBLEM — A41.9 SEPSIS (HCC): Status: ACTIVE | Noted: 2022-05-21

## 2022-05-21 LAB
ACCESSION NUMBER, LLC1M: ABNORMAL
ARTERIAL PATENCY WRIST A: POSITIVE
BASE DEFICIT BLD-SCNC: 5.9 MMOL/L
BDY SITE: ABNORMAL
BLAKPC BLD POS QL NAA+NON-PROBE: NOT DETECTED
CA-I BLD-MCNC: 1.08 MMOL/L (ref 1.12–1.32)
E COLI DNA BLD POS QL NAA+NON-PROBE: DETECTED
EST. AVERAGE GLUCOSE BLD GHB EST-MCNC: 263 MG/DL
GAS FLOW.O2 O2 DELIVERY SYS: ABNORMAL L/MIN
GLUCOSE BLD STRIP.AUTO-MCNC: 315 MG/DL (ref 65–100)
GLUCOSE BLD STRIP.AUTO-MCNC: 338 MG/DL (ref 65–100)
GLUCOSE BLD STRIP.AUTO-MCNC: 383 MG/DL (ref 65–100)
GLUCOSE BLD STRIP.AUTO-MCNC: 396 MG/DL (ref 65–100)
HBA1C MFR BLD: 10.8 % (ref 4.2–6.3)
HCO3 BLD-SCNC: 18.4 MMOL/L (ref 22–26)
INTERPRETATION: ABNORMAL
PCO2 BLD: 30.9 MMHG (ref 35–45)
PH BLD: 7.38 [PH] (ref 7.35–7.45)
PO2 BLD: 74 MMHG (ref 75–100)
POTASSIUM BLD-SCNC: 4 MMOL/L (ref 3.5–5.1)
PROCALCITONIN SERPL-MCNC: 135.84 NG/ML (ref 0–0.49)
SAO2 % BLD: 95 %
SERVICE CMNT-IMP: ABNORMAL
SODIUM BLD-SCNC: 124 MMOL/L (ref 136–145)
SPECIMEN SITE: ABNORMAL

## 2022-05-21 PROCEDURE — 36415 COLL VENOUS BLD VENIPUNCTURE: CPT

## 2022-05-21 PROCEDURE — 6360000002 HC RX W HCPCS: Performed by: NURSE PRACTITIONER

## 2022-05-21 PROCEDURE — 87040 BLOOD CULTURE FOR BACTERIA: CPT

## 2022-05-21 PROCEDURE — 9990 CHARGE CONVERSION

## 2022-05-21 PROCEDURE — 6360000002 HC RX W HCPCS

## 2022-05-21 PROCEDURE — 96375 TX/PRO/DX INJ NEW DRUG ADDON: CPT

## 2022-05-21 PROCEDURE — 85025 COMPLETE CBC W/AUTO DIFF WBC: CPT

## 2022-05-21 PROCEDURE — 6370000000 HC RX 637 (ALT 250 FOR IP): Performed by: FAMILY MEDICINE

## 2022-05-21 PROCEDURE — 2580000003 HC RX 258

## 2022-05-21 PROCEDURE — 6370000000 HC RX 637 (ALT 250 FOR IP)

## 2022-05-21 PROCEDURE — 2580000003 HC RX 258: Performed by: NURSE PRACTITIONER

## 2022-05-21 PROCEDURE — 80061 LIPID PANEL: CPT

## 2022-05-21 PROCEDURE — 96372 THER/PROPH/DIAG INJ SC/IM: CPT

## 2022-05-21 PROCEDURE — 6370000000 HC RX 637 (ALT 250 FOR IP): Performed by: NURSE PRACTITIONER

## 2022-05-21 PROCEDURE — 80053 COMPREHEN METABOLIC PANEL: CPT

## 2022-05-21 PROCEDURE — 87150 DNA/RNA AMPLIFIED PROBE: CPT

## 2022-05-21 PROCEDURE — 93005 ELECTROCARDIOGRAM TRACING: CPT | Performed by: NURSE PRACTITIONER

## 2022-05-21 PROCEDURE — 1100000003 HC PRIVATE W/ TELEMETRY

## 2022-05-21 PROCEDURE — 96374 THER/PROPH/DIAG INJ IV PUSH: CPT

## 2022-05-21 PROCEDURE — 70450 CT HEAD/BRAIN W/O DYE: CPT

## 2022-05-21 PROCEDURE — 65270000029 HC RM PRIVATE

## 2022-05-21 PROCEDURE — 84132 ASSAY OF SERUM POTASSIUM: CPT

## 2022-05-21 RX ORDER — 0.9 % SODIUM CHLORIDE 0.9 %
1000 INTRAVENOUS SOLUTION INTRAVENOUS
Status: COMPLETED | OUTPATIENT
Start: 2022-05-21 | End: 2022-05-21

## 2022-05-21 RX ORDER — ENOXAPARIN SODIUM 100 MG/ML
40 INJECTION SUBCUTANEOUS
Status: COMPLETED | OUTPATIENT
Start: 2022-05-21 | End: 2022-05-21

## 2022-05-21 RX ORDER — KETOROLAC TROMETHAMINE 30 MG/ML
30 INJECTION, SOLUTION INTRAMUSCULAR; INTRAVENOUS
Status: ACTIVE | OUTPATIENT
Start: 2022-05-21 | End: 2022-05-21

## 2022-05-21 RX ORDER — SODIUM CHLORIDE 9 MG/ML
INJECTION, SOLUTION INTRAVENOUS CONTINUOUS
Status: DISCONTINUED | OUTPATIENT
Start: 2022-05-21 | End: 2022-05-22

## 2022-05-21 RX ORDER — ACETAMINOPHEN 325 MG/1
650 TABLET ORAL EVERY 6 HOURS PRN
Status: DISCONTINUED | OUTPATIENT
Start: 2022-05-21 | End: 2022-05-28 | Stop reason: HOSPADM

## 2022-05-21 RX ORDER — INSULIN LISPRO 100 [IU]/ML
0-16 INJECTION, SOLUTION INTRAVENOUS; SUBCUTANEOUS
Status: DISCONTINUED | OUTPATIENT
Start: 2022-05-21 | End: 2022-05-21

## 2022-05-21 RX ORDER — INSULIN LISPRO 100 [IU]/ML
0-16 INJECTION, SOLUTION INTRAVENOUS; SUBCUTANEOUS
Status: DISCONTINUED | OUTPATIENT
Start: 2022-05-21 | End: 2022-05-28 | Stop reason: HOSPADM

## 2022-05-21 RX ORDER — HEPARIN SODIUM 5000 [USP'U]/ML
5000 INJECTION, SOLUTION INTRAVENOUS; SUBCUTANEOUS EVERY 8 HOURS
Status: DISCONTINUED | OUTPATIENT
Start: 2022-05-21 | End: 2022-05-28 | Stop reason: HOSPADM

## 2022-05-21 RX ORDER — ACETAMINOPHEN 650 MG/1
650 SUPPOSITORY RECTAL
Status: COMPLETED | OUTPATIENT
Start: 2022-05-21 | End: 2022-05-21

## 2022-05-21 RX ADMIN — HEPARIN SODIUM 5000 UNITS: 5000 INJECTION INTRAVENOUS; SUBCUTANEOUS at 17:18

## 2022-05-21 RX ADMIN — INSULIN LISPRO 16 UNITS: 100 INJECTION, SOLUTION INTRAVENOUS; SUBCUTANEOUS at 11:41

## 2022-05-21 RX ADMIN — HEPARIN SODIUM 5000 UNITS: 5000 INJECTION INTRAVENOUS; SUBCUTANEOUS at 22:32

## 2022-05-21 RX ADMIN — INSULIN LISPRO 12 UNITS: 100 INJECTION, SOLUTION INTRAVENOUS; SUBCUTANEOUS at 22:28

## 2022-05-21 RX ADMIN — ACETAMINOPHEN 650 MG: 650 SUPPOSITORY RECTAL at 02:55

## 2022-05-21 RX ADMIN — CEFTRIAXONE 1000 MG: 1 INJECTION, POWDER, FOR SOLUTION INTRAMUSCULAR; INTRAVENOUS at 12:04

## 2022-05-21 RX ADMIN — SODIUM CHLORIDE: 900 INJECTION, SOLUTION INTRAVENOUS at 12:04

## 2022-05-21 RX ADMIN — AZITHROMYCIN MONOHYDRATE 500 MG: 500 INJECTION, POWDER, LYOPHILIZED, FOR SOLUTION INTRAVENOUS at 02:55

## 2022-05-21 RX ADMIN — INSULIN LISPRO 12 UNITS: 100 INJECTION, SOLUTION INTRAVENOUS; SUBCUTANEOUS at 17:20

## 2022-05-21 RX ADMIN — SODIUM CHLORIDE: 900 INJECTION, SOLUTION INTRAVENOUS at 21:37

## 2022-05-21 RX ADMIN — ACETAMINOPHEN 650 MG: 325 TABLET ORAL at 17:39

## 2022-05-21 RX ADMIN — SODIUM CHLORIDE 1000 ML: 900 INJECTION, SOLUTION INTRAVENOUS at 05:41

## 2022-05-21 RX ADMIN — ENOXAPARIN SODIUM 40 MG: 40 INJECTION SUBCUTANEOUS at 02:55

## 2022-05-21 NOTE — H&P
Hospitalist History and Physical   Admit Date:  2022  4:37 PM   Name:  Natan Headley. Age:  36 y.o. Sex:  male  :  1980   MRN:  [id number]   Room:  [place] [address]    Presenting Complaint: Altered mental status    Reason(s) for Admission: Sepsis ([facility] [A41.9]     History of Present Illness:   Natan Hayward is a 36 y.o. male with a h/o spina bifida, s/p R BKA, severe obesity, HTN, GERD, hepatic steatosis who was sent to the ER by his facility this evening due to AMS and concerns for sepsis. Tm 103. 3F and tachycardic on arrival. Labs showed normocytic anemia, thrombocytopenia and MECCA. UA with pyuria. CXR with L sided infiltrate. He is on 2L NC O2. Confused and oriented to self only, falls asleep easily. Unable to get reliable ROS due to encephalopathy. Review of Systems:  10 systems reviewed and negative except as noted in HPI. Assessment & Plan:   # Severe sepsis 2/2 UTI and presumed bacterial CAP   - Tachycardia + fever + cystitis + infiltrate on CXR + encephalopathy and MECCA. - Treat with Rocephin/azithromycin. Blood and urine cultures. Check RVP, wean O2 as able. Given 2L bolus, will give additional 2L. # Acute septic/metabolic encephalopathy   - 2/2 above. Head CT ordered and pending. Check ammonia, ABG and UDS. Hold any offending meds. # MECCA   - Likely pre-renal. Sepsis boluses as above followed by maintenance fluids. Further work up if Cr does not improve. BMP tomorrow. # Thrombocytopenia   - Does have hx hepatic steatosis but normal bili and liver enzymes. Likely from acute illness/sepsis. CBC tomorrow. # MDD/anxiety   - Buspar, bupropion. Hold Seroquel due to encephalopathy. # GERD   - Pepcid    # H/o spina bifida   - Resides in nursing home    # Hepatic steatosis   - Due to morbid obesity, check lipids    # S/p R BKA    Dispo/Discharge Planning: Pending. Complex patient with high risk of further decline given numerous acute and chronic issues.   Diet: DIET NPO Sips of Water with Meds, Sips of Clear Liquids  VTE ppx: Lovenox  Code status: Patient confused, no family at bedside, full code for now.     Hospital Problems as of 5/20/2022 Date Reviewed: 10/28/2014          Codes Class Noted - Resolved POA    * (Principal) Severe sepsis ([place] [address].) ICD-10-CM: A41.9, R65.20  ICD-9-CM: 038.9, 995.92  5/20/2022 - Present Unknown        MECCA (acute kidney injury) ([place] [address].) ICD-10-CM: N17.9  ICD-9-CM: 584.9  5/20/2022 - Present Yes        Hx of right BKA ([place] [address].) ICD-10-CM: Z89.511  ICD-9-CM: V49.75  5/20/2022 - Present Yes        Thrombocytopenia ([place] [address].) ICD-10-CM: D69.6  ICD-9-CM: 287.5  5/20/2022 - Present Yes        Spina bifida ([place] [address].) ICD-10-CM: Q05.9  ICD-9-CM: 741.90  5/20/2022 - Present Yes        GERD (gastroesophageal reflux disease) ICD-10-CM: K21.9  ICD-9-CM: 530.81  5/20/2022 - Present Yes        HTN (hypertension) ICD-10-CM: I10  ICD-9-CM: 401.9  5/20/2022 - Present Yes        Hepatic steatosis ICD-10-CM: K76.0  ICD-9-CM: 571.8  5/20/2022 - Present Yes        Severe obesity (BMI 35.0-35.9 with comorbidity) ([place] [address] ICD-10-CM: E66.01, Z68.35  ICD-9-CM: 278.01, V85.35  5/20/2022 - Present Yes        Morbid obesity with BMI of 50.0-59.9, adult ([place] [address] ICD-10-CM: E66.01, Z68.43  ICD-9-CM: 278.01, V85.43  5/20/2022 - Present Yes        Fever ICD-10-CM: R50.9  ICD-9-CM: 780.60  5/20/2022 - Present Unknown        Sinus tachycardia ICD-10-CM: R00.0  ICD-9-CM: 427.89  5/20/2022 - Present Unknown        Normocytic anemia ICD-10-CM: D64.9  ICD-9-CM: 285.9  5/20/2022 - Present Unknown        Acute metabolic encephalopathy PZU-92-BI: G93.41  ICD-9-CM: 348.31  5/20/2022 - Present Unknown              Past History:  Past Medical History:   Diagnosis Date    Depression     GERD (gastroesophageal reflux disease)     Heart disease     Hypertension     Neurogenic bladder, NOS     Spina bifida ([facility]     Spina bifida without mention of hydrocephalus, unspecified region     Stress incontinence, male     Toxic effect of latex(989.82)     Urinary tract infection, site not specified      Past Surgical History:   Procedure Laterality Date    HX ORTHOPAEDIC Right     foot surgery    HX UROLOGICAL      artificial urinary sphincter      Allergies   Allergen Reactions    Latex Unknown (comments)      Social History     Tobacco Use    Smoking status: Former Smoker    Smokeless tobacco: Current User   Substance Use Topics    Alcohol use: No      Family History   Problem Relation Age of Onset    No Known Problems Mother     No Known Problems Father       Family history reviewed and negative except as noted above. There is no immunization history on file for this patient. Prior to Admit Medications:  Current Outpatient Medications   Medication Instructions    acetaminophen (TYLENOL) 500 mg tablet EVERY 6 HOURS AS NEEDED    amitriptyline (ELAVIL) 150 mg tablet EVERY BEDTIME    atenoloL (TENORMIN) 100 mg, DAILY    buPROPion SR (WELLBUTRIN SR) 150 mg SR tablet 2 TIMES DAILY    BUSPIRONE HCL (BUSPAR PO) 30 mg    Catheter 14 Fr misc by Does Not Apply route.  celecoxib (CELEBREX) 200 mg capsule 2 TIMES DAILY    CETIRIZINE HCL/PSEUDOEPHEDRINE (ALLERGY D-12 PO) Take  by mouth.  ciprofloxacin (CIPRO) 500 mg tablet 2 TIMES DAILY    cyanocobalamin 1,000 mcg, DAILY    darifenacin (ENABLEX) 15 mg, DAILY    dicyclomine (BENTYL) 20 mg, EVERY 6 HOURS    dicyclomine (BENTYL) 20 mg, Oral, EVERY 6 HOURS    DULoxetine (CYMBALTA) 60 mg, DAILY    escitalopram oxalate (LEXAPRO) 20 mg, DAILY    FOLIC ACID/MULTIVITS-MIN (MEN'S DAILY FORMULA PO) Take  by mouth.  gabapentin (NEURONTIN) 300 mg, 3 TIMES DAILY    hydroCHLOROthiazide (HYDRODIURIL) 25 mg, DAILY    HYDROcodone-acetaminophen (LORTAB)  mg per tablet EVERY 6 HOURS AS NEEDED    hyoscyamine SR (LEVBID) 375 mcg, Oral, EVERY 12 HOURS AS NEEDED    iron 18 mg tab Take  by mouth.     metoclopramide HCl (REGLAN) 10 mg, 4 TIMES DAILY BEFORE MEALS & NIGHTLY    morphine CR (MS CONTIN) 60 mg CR tablet EVERY 12 HOURS    MULTIVITAMIN/IRON/FOLIC ACID (CENTRUM COMPLETE PO) Take  by mouth.  oxcarbazepine (TRILEPTAL) 150 mg tablet Take  by mouth.  oxybutynin chloride XL (DITROPAN XL) 5 mg, Oral, DAILY    POLYETHYLENE GLYCOL 3350 (MIRALAX PO) Take  by mouth.  potassium chloride SA (MICRO-K) 10 mEq capsule 10 mEq, 2 TIMES DAILY    QUEtiapine (SEROQUEL) 50 mg, 2 TIMES DAILY    raNITIdine (ZANTAC) 150 mg, 2 TIMES DAILY    saw palmetto 160 mg cap Take  by mouth.  solifenacin (VESICARE) 10 mg, DAILY    traZODone (DESYREL) 150 mg, EVERY BEDTIME    verapamiL (CALAN) 120 mg, 3 TIMES DAILY    Zinc Ox-Aloe Vera-Vitamin E (BALMEX) 11.3 % topical cream AS NEEDED       Objective:     Patient Vitals for the past 24 hrs:   Temp Pulse Resp BP SpO2   05/20/22 2200 -- (!) 132 30 (!) 109/54 99 %   05/20/22 2139 -- (!) 130 30 111/60 100 %   05/20/22 2124 -- (!) 130 27 (!) 102/51 99 %   05/20/22 2059 -- (!) 130 (!) 31 104/84 92 %   05/20/22 2044 -- (!) 129 29 (!) 105/54 93 %   05/20/22 2014 -- (!) 127 -- (!) 93/59 --   05/20/22 1939 -- (!) 124 -- (!) 115/55 --   05/20/22 1922 -- (!) 126 (!) 34 107/64 98 %   05/20/22 1920 (!) 103.3 °F (39.6 °C) -- -- -- --   05/20/22 1844 -- (!) 130 24 (!) 93/57 96 %   05/20/22 1804 -- (!) 130 -- (!) 101/58 97 %   05/20/22 1729 -- -- -- -- 96 %   05/20/22 1729 -- (!) 127 27 (!) 102/44 --   05/20/22 1644 99 °F (37.2 °C) (!) 123 20 (!) 123/99 96 %     Oxygen Therapy  O2 Sat (%): 99 % (05/20/22 2200)  Pulse via Oximetry: 131 beats per minute (05/20/22 2200)  O2 Device: Nasal cannula (05/20/22 1729)  O2 Flow Rate (L/min): 3 l/min (05/20/22 1729)    Estimated body mass index is 55.97 kg/m² as calculated from the following:    Height as of this encounter: 5' 9\" (1.753 m). Weight as of this encounter: 171.9 kg (379 lb).   No intake or output data in the 24 hours ending 05/20/22 0251      Physical Exam:  Blood pressure (!) 109/54, pulse (!) 132, temperature (!) 103.3 °F (39.6 °C), resp. rate 30, height 5' 9\" (1.753 m), weight (!) 171.9 kg (379 lb), SpO2 99 %. General:    Confused, morbidly obese. Chronically ill appearing. Head:  Normocephalic, atraumatic  Eyes:  Sclerae appear normal.  Pupils equally round. ENT:  Nares appear normal, no drainage. Moist oral mucosa  Neck:  No restricted ROM. Trachea midline   CV:   Tachycardia 120s. No m/r/g. No jugular venous distension. Lungs:   Difficult to auscultate due to body habitus but seemingly clear. No wheezing, rhonchi, or rales. Respirations even, unlabored. Abdomen: Bowel sounds present. Soft, nontender, nondistended. Extremities: No cyanosis or clubbing. No edema. Stasis dermatitis. R BKA. Skin:     No rashes and normal coloration. Warm and dry. Neuro:  CN II-XII grossly intact. Sensation intact. Awake, oriented to self and year but not place. Psych:  As above. I have reviewed ordered lab tests and independently visualized imaging below:    Last 24hr Labs:  Recent Results (from the past 24 hour(s))   LACTIC ACID    Collection Time: 05/20/22  5:06 PM   Result Value Ref Range    Lactic acid 2.0 0.4 - 2.0 MMOL/L   CBC WITH AUTOMATED DIFF    Collection Time: 05/20/22  5:06 PM   Result Value Ref Range    WBC 10.2 4.3 - 11.1 K/uL    RBC 4.07 (L) 4.23 - 5.6 M/uL    HGB 10.7 (L) 13.6 - 17.2 g/dL    HCT 33.8 (L) 41.1 - 50.3 %    MCV 83.0 79.6 - 97.8 FL    MCH 26.3 26.1 - 32.9 PG    MCHC 31.7 31.4 - 35.0 g/dL    RDW 14.4 11.9 - 14.6 %    PLATELET 820 (L) 592 - 450 K/uL    MPV 10.8 9.4 - 12.3 FL    ABSOLUTE NRBC 0.00 0.0 - 0.2 K/uL    DF AUTOMATED      NEUTROPHILS 73 43 - 78 %    LYMPHOCYTES 13 13 - 44 %    MONOCYTES 13 (H) 4.0 - 12.0 %    EOSINOPHILS 0 (L) 0.5 - 7.8 %    BASOPHILS 0 0.0 - 2.0 %    IMMATURE GRANULOCYTES 1 0.0 - 5.0 %    ABS. NEUTROPHILS 7.4 1.7 - 8.2 K/UL    ABS. LYMPHOCYTES 1.3 0.5 - 4.6 K/UL    ABS. MONOCYTES 1.3 0.1 - 1.3 K/UL    ABS.  EOSINOPHILS 0.0 0.0 - 0.8 K/UL ABS. BASOPHILS 0.0 0.0 - 0.2 K/UL    ABS. IMM. GRANS. 0.1 0.0 - 0.5 K/UL   METABOLIC PANEL, COMPREHENSIVE    Collection Time: 05/20/22  5:06 PM   Result Value Ref Range    Sodium 131 (L) 138 - 145 mmol/L    Potassium 3.9 3.5 - 5.1 mmol/L    Chloride 96 (L) 98 - 107 mmol/L    CO2 25 21 - 32 mmol/L    Anion gap 10 7 - 16 mmol/L    Glucose 267 (H) 65 - 100 mg/dL    BUN 35 (H) 6 - 23 MG/DL    Creatinine 3.10 (H) 0.8 - 1.5 MG/DL    GFR est AA 28 (L) >60 ml/min/1.73m2    GFR est non-AA 23 (L) >60 ml/min/1.73m2    Calcium 8.7 8.3 - 10.4 MG/DL    Bilirubin, total 0.4 0.2 - 1.1 MG/DL    ALT (SGPT) 21 12 - 65 U/L    AST (SGOT) 13 (L) 15 - 37 U/L    Alk.  phosphatase 34 (L) 50 - 136 U/L    Protein, total 7.2 6.3 - 8.2 g/dL    Albumin 2.6 (L) 3.5 - 5.0 g/dL    Globulin 4.6 (H) 2.3 - 3.5 g/dL    A-G Ratio 0.6 (L) 1.2 - 3.5     URINALYSIS W/ RFLX MICROSCOPIC    Collection Time: 05/20/22  7:58 PM   Result Value Ref Range    Color YELLOW      Appearance TURBID      Specific gravity >1.030 (H) 1.001 - 1.023    pH (UA) 5.5 5.0 - 9.0      Protein 100 (A) NEG mg/dL    Glucose 250 mg/dL    Ketone Negative NEG mg/dL    Bilirubin Negative NEG      Blood MODERATE (A) NEG      Urobilinogen 0.2 0.2 - 1.0 EU/dL    Nitrites Negative NEG      Leukocyte Esterase MODERATE (A) NEG     URINE MICROSCOPIC    Collection Time: 05/20/22  7:58 PM   Result Value Ref Range    WBC >100 0 /hpf    RBC 0-3 0 /hpf    Epithelial cells 0-3 0 /hpf    Bacteria 2+ (H) 0 /hpf    Casts GRANULAR 0 /lpf    Crystals, urine 0 0 /LPF    Mucus 0 0 /lpf    Other observations RESULTS VERIFIED MANUALLY     AMMONIA    Collection Time: 05/20/22 10:31 PM   Result Value Ref Range    Ammonia, plasma 15 11 - 32 UMOL/L   RESPIRATORY VIRUS PANEL W/COVID-19, PCR    Collection Time: 05/20/22 10:31 PM    Specimen: Nasopharyngeal   Result Value Ref Range    Adenovirus NOT DETECTED NOTDET      Coronavirus 229E NOT DETECTED NOTDET      Coronavirus HKU1 NOT DETECTED NOTDET      Coronavirus CVNL63 NOT DETECTED NOTDET      Coronavirus OC43 NOT DETECTED NOTDET      SARS-CoV-2, PCR NOT DETECTED NOTDET      Metapneumovirus NOT DETECTED NOTDET      Rhinovirus and Enterovirus NOT DETECTED NOTDET      Influenza A NOT DETECTED NOTDET      Influenza B NOT DETECTED NOTDET      Parainfluenza 1 NOT DETECTED NOTDET      Parainfluenza 2 NOT DETECTED NOTDET      Parainfluenza 3 NOT DETECTED NOTDET      Parainfluenza virus 4 NOT DETECTED NOTDET      RSV by PCR NOT DETECTED NOTDET      B. parapertussis, PCR NOT DETECTED NOTDET      Bordetella pertussis - PCR NOT DETECTED NOTDET      Chlamydophila pneumoniae DNA, QL, PCR NOT DETECTED NOTDET      Mycoplasma pneumoniae DNA, QL, PCR NOT DETECTED NOTDET     BLOOD GAS, ARTERIAL POC    Collection Time: 05/20/22 11:01 PM   Result Value Ref Range    Device: NASAL CANNULA      pH (POC) 7.39 7.35 - 7.45      pCO2 (POC) 34.6 (L) 35 - 45 MMHG    pO2 (POC) 72 (L) 75 - 100 MMHG    HCO3 (POC) 21.0 (L) 22 - 26 MMOL/L    sO2 (POC) 94.4 (L) 95 - 98 %    Base deficit (POC) 3.4 mmol/L    Allens test (POC) Positive      Site LEFT RADIAL      Specimen type (POC) ARTERIAL      Performed by Ruthy        All Micro Results     Procedure Component Value Units Date/Time    RESPIRATORY VIRUS PANEL W/COVID-19, PCR [[id number]] Collected: 05/20/22 2231    Order Status: Completed Specimen: Nasopharyngeal Updated: 05/20/22 2343     Adenovirus NOT DETECTED        Coronavirus 229E NOT DETECTED        Coronavirus HKU1 NOT DETECTED        Coronavirus CVNL63 NOT DETECTED        Coronavirus OC43 NOT DETECTED        SARS-CoV-2, PCR NOT DETECTED        Metapneumovirus NOT DETECTED        Rhinovirus and Enterovirus NOT DETECTED        Influenza A NOT DETECTED        Influenza B NOT DETECTED        Parainfluenza 1 NOT DETECTED        Parainfluenza 2 NOT DETECTED        Parainfluenza 3 NOT DETECTED        Parainfluenza virus 4 NOT DETECTED        RSV by PCR NOT DETECTED        B. parapertussis, PCR NOT DETECTED        Bordetella pertussis - PCR NOT DETECTED        Chlamydophila pneumoniae DNA, QL, PCR NOT DETECTED        Mycoplasma pneumoniae DNA, QL, PCR NOT DETECTED       BLOOD CULTURE [[id number]] Collected: 05/20/22 1645    Order Status: Completed Specimen: Blood Updated: 05/20/22 1836    BLOOD CULTURE [[id number]] Collected: 05/20/22 1706    Order Status: Completed Specimen: Blood Updated: 05/20/22 1809          Other Studies:  XR CHEST PORT    Result Date: 5/20/2022  CHEST X-RAY, single portable view  5/20/2022 History: Altered mental status. Needs series criteria. Technique: Single frontal view of the chest. Comparison: None Findings: Today's study is limited by patient body habitus as well as the hypoaerated appearance of the lungs. The cardiac silhouette is not clearly enlarged. The lungs are expanded without evidence for pneumothorax. Asymmetric opacity seen over the left hemithorax raising concern for an asymmetric left-sided infiltrate. No clear pleural effusion is demonstrated. 1.  Suggested left lung infiltrate. Pneumonia is not excluded. This report was made using voice transcription. Despite my best efforts to avoid any, transcription errors may persist. If there is any question about the accuracy of the report or need for clarification, then please call [phone], or text me through [email or web address] for clarification or correction.         Medications Administered     acetaminophen (TYLENOL) suppository 975 mg     Admin Date  05/20/2022 Action  Given Dose  975 mg Route  Rectal Administered By  Javier Rosa RN          cefTRIAXone (ROCEPHIN) 1 g in 0.9% sodium chloride (MBP/ADV) 50 mL MBP     Admin Date  05/20/2022 Action  New Bag Dose  1 g Rate  100 mL/hr Route  IntraVENous Administered By  Blanca Kraus RN          lactated Ringers infusion 1,000 mL     Admin Date  05/20/2022 Action  New Bag Dose  1,000 mL Route  IntraVENous Administered By  Blanca Kraus RN          sodium chloride 0.9 % bolus infusion 1,000 mL     Admin Date  05/20/2022 Action  New Bag Dose  1,000 mL Route  IntraVENous Administered By  Blanca Kraus RN                Signed:  Renay Meadows MD    Part of this note may have been written by using a voice dictation software. The note has been proof read but may still contain some grammatical/other typographical errors.

## 2022-05-21 NOTE — PROGRESS NOTES
TRANSFER - IN REPORT:    Verbal report received from floor nurse director on Roberta Martinez.  being received from ED for routine progression of patient care      Report consisted of patient's Situation, Background, Assessment and   Recommendations(SBAR). Information from the following report(s) Nurse Handoff Report, Index, ED Encounter Summary, ED SBAR, Intake/Output, MAR and Recent Results was reviewed with the receiving nurse. Opportunity for questions and clarification was provided. Assessment completed upon patient's arrival to unit and care assumed.

## 2022-05-21 NOTE — PROGRESS NOTES
Hospitalist Progress Note   Admit Date:  2022  1:58 AM   Name:  Roberta Martinez. Age:  36 y.o. Sex:  male  :  1980   MRN:  [id number]   Room:  [facility]/    Presenting Complaint: Blood Infection    Reason(s) for Admission: Sepsis [facility]) [A41.9]     Hospital Course & Interval History:   Patient is a 36year old CM with a PMH of DM, HTN, GERD, RBKA, obesity who presented to the ER from rehab facility with an altered mentation. UA positive for UTI. CXR suggested left lung infiltrate. Pneumonia is not excluded. Subjective/24hr Events (22): Denies CP, SOB, chills, fever, dysuria     ROS:  10 systems reviewed and negative except as noted above. Assessment & Plan:   Sepsis  22  -Patient with fever, tachypnea +UTI and suspected infiltrates  -Continue empiric Rocephin, Azithromycin with cultures pending  -Procal in a.m.; deescalate as appropriate      UTI  CAP  -Causing above. Treat as above     Acute Metabolic Encephalopathy    -Resolved; patient alert and oriented x 3     JENNIFER  22  -Cr 3.10 fr 1.03 21   -Renal US pending  -Continue IVF; Nephrology consult if no improvement     Spina Bifida  -Noted     GERD  -Will continue home meds once verified     HTN  -BP stable continue home meds once verified     DM  22  Accu checks with SSI-A1c in a.m. Anemia   22  -Stable Hgb 10.3      Discharge Plannin-2 days     Diet:  ADULT DIET; Regular; 3 carb choices (45 gm/meal); Low Sodium (2 gm)  DVT PPx: Heparin SQ   Code status: Full Code         Objective:   Patient Vitals for the past 24 hrs:   Temp Pulse Resp BP SpO2   22 1330 99.3 °F (37.4 °C) -- -- -- --   22 1214 102.2 °F (39 °C) 143 28 128/74 98 %   22 0857 100.8 °F (38.2 °C) -- 26 132/72 96 %   22 0507 102.7 °F (39.3 °C) -- -- -- --     @TPTKUUTTU([id number])@    Estimated body mass index is 55.97 kg/m² as calculated from the following:    Height as of 22: 5' 9\" (1.753 m). Weight as of 5/20/22: 379 lb (171.9 kg). Intake/Output Summary (Last 24 hours) at 5/21/2022 1401  Last data filed at 5/21/2022 0651  Gross per 24 hour   Intake 1000 ml   Output --   Net 1000 ml         Physical Exam:     Blood pressure 128/74, pulse 143, temperature 99.3 °F (37.4 °C), resp. rate 28, SpO2 98 %. General:    Morbidly obese. No overt distress  Head:  Normocephalic, atraumatic  Eyes:  Sclerae appear normal.  Pupils equally round. ENT:  Nares appear normal, no drainage. Moist oral mucosa  Neck:  No restricted ROM. Trachea midline   CV:   RRR. No m/r/g. No jugular venous distension. Lungs:   CTAB. No wheezing, rhonchi, or rales. Respirations even, unlabored  Abdomen: Bowel sounds present. Soft, nontender, nondistended. Extremities: No cyanosis or clubbing. No edema; RBKA  Skin:     No rashes and normal coloration. Warm and dry. Neuro:  CN II-XII grossly intact. A&Ox3  Psych:  Normal mood and affect.       I have reviewed ordered lab tests and independently visualized imaging below:    Recent Labs:  Recent Results (from the past 48 hour(s))   Comprehensive Metabolic Panel    Collection Time: 05/20/22  5:06 PM   Result Value Ref Range    Sodium 131 (L) 138 - 145 mmol/L    Potassium 3.9 3.5 - 5.1 mmol/L    Chloride 96 (L) 98 - 107 mmol/L    CO2 25 21 - 32 mmol/L    Anion Gap 10 7 - 16 mmol/L    Glucose 267 (H) 65 - 100 mg/dL    BUN 35 (H) 6 - 23 MG/DL    CREATININE 3.10 (H) 0.8 - 1.5 MG/DL    GFR African American 28 (L) >60 ml/min/1.73m2    EGFR IF NonAfrican American 23 (L) >60 ml/min/1.73m2    Calcium 8.7 8.3 - 10.4 MG/DL    Total Bilirubin 0.4 0.2 - 1.1 MG/DL    ALT 21 12 - 65 U/L    AST 13 (L) 15 - 37 U/L    Alkaline Phosphatase 34 (L) 50 - 136 U/L    Total Protein 7.2 6.3 - 8.2 g/dL    Albumin 2.6 (L) 3.5 - 5.0 g/dL    Globulin 4.6 (H) 2.3 - 3.5 g/dL    Albumin/Globulin Ratio 0.6 (L) 1.2 - 3.5     CBC with Auto Differential    Collection Time: 05/20/22  5:06 PM   Result Value Ref Range    WBC 10.2 4.3 - 11.1 K/uL    RBC 4.07 (L) 4.23 - 5.6 M/uL    Hemoglobin 10.7 (L) 13.6 - 17.2 g/dL    Hematocrit 33.8 (L) 41.1 - 50.3 %    MCV 83.0 79.6 - 97.8 FL    MCH 26.3 26.1 - 32.9 PG    MCHC 31.7 31.4 - 35.0 g/dL    RDW 14.4 11.9 - 14.6 %    Platelets 734 (L) 467 - 450 K/uL    MPV 10.8 9.4 - 12.3 FL    NRBC Absolute 0.00 0.0 - 0.2 K/uL    Differential Type AUTOMATED      Neutrophils % 73 43 - 78 %    Lymphocytes % 13 13 - 44 %    Monocytes % 13 (H) 4.0 - 12.0 %    Eosinophils % 0 (L) 0.5 - 7.8 %    Basophils % 0 0.0 - 2.0 %    Immature Granulocytes 1 0.0 - 5.0 %    Neutrophils Absolute 7.4 1.7 - 8.2 K/UL    Lymphocytes Absolute 1.3 0.5 - 4.6 K/UL    Monocytes Absolute 1.3 0.1 - 1.3 K/UL    Eosinophils Absolute 0.0 0.0 - 0.8 K/UL    Basophils Absolute 0.0 0.0 - 0.2 K/UL    GRANULOCYTE ABSOLUTE COUNT 0.1 0.0 - 0.5 K/UL   Lactic Acid    Collection Time: 05/20/22  5:06 PM   Result Value Ref Range    Lactic Acid 2.0 0.4 - 2.0 MMOL/L   Urinalysis W/ Rflx Microscopic    Collection Time: 05/20/22  7:58 PM   Result Value Ref Range    Color, UA YELLOW      Clarity, UA TURBID      Specific Gravity, UA >1.030 (H) 1.001 - 1.023 NA    pH, UA 5.5 5.0 - 9.0      Protein,  (A) NEG mg/dL    Glucose, Ur 250 mg/dL    Ketones, Urine Negative NEG mg/dL    Bilirubin, Urine Negative NEG      Blood, Urine MODERATE (A) NEG      Urobilinogen, UA, POCT 0.2 0.2 - 1.0 EU/dL    Nitrite, Urine Negative NEG      Leukocyte Esterase, Urine MODERATE (A) NEG     URINE MICROSCOPIC    Collection Time: 05/20/22  7:58 PM   Result Value Ref Range    WBC, UA >100 0 /hpf    RBC, UA 0-3 0 /hpf    Epithelial Cells, UA 0-3 0 /hpf    BACTERIA, URINE 2+ (H) 0 /hpf    Casts UA GRANULAR 0 /lpf    Crystals, UA 0 0 /LPF    MUCUS, URINE 0 0 /lpf    OTHER OBSERVATIONS RESULTS VERIFIED MANUALLY     Ammonia    Collection Time: 05/20/22 10:31 PM   Result Value Ref Range    Ammonia 15 11 - 32 UMOL/L   Respiratory Virus Panel W/COVID-19, PCR    Collection Time: 05/20/22 10:31 PM    Specimen: Nasopharyngeal   Result Value Ref Range    Adenovirus NOT DETECTED NOTDET      CORONAVIRUS 229E NOT DETECTED NOTDET      CORONAVIRUS HKU1 NOT DETECTED NOTDET      CORONAVIRUS NL63 NOT DETECTED NOTDET      CORONAVIRUS OC43 NOT DETECTED NOTDET      SARS-COV-2 PCR, COVPCR NOT DETECTED NOTDET      Metapneumovirus NOT DETECTED NOTDET      Rhinovirus Enterovirus PCR NOT DETECTED NOTDET      INFLUENZA A NOT DETECTED NOTDET      INFLUENZA B NOT DETECTED NOTDET      Parainfluenza 1 NOT DETECTED NOTDET      Parainfluenza 2 NOT DETECTED NOTDET      Parainfluenza 3 NOT DETECTED NOTDET      PARAINFLUENZA 4 NOT DETECTED NOTDET      RSV by PCR NOT DETECTED NOTDET      BORDETELLA PARAPERTUSSIS PCR, BORPAR NOT DETECTED NOTDET      Bordetella pertussis by PCR NOT DETECTED NOTDET      Chlamydophilia pneumoniae by PCR NOT DETECTED NOTDET      Mycoplasma pneumoniae by PCR NOT DETECTED NOTDET     BLOOD GAS, ARTERIAL POC    Collection Time: 05/20/22 11:01 PM   Result Value Ref Range    POC Device NASAL CANNULA      pH, Art 7.39 7.35 - 7.45      pCO2, Art 34.6 (L) 35 - 45 MMHG    pO2, Art 72 (L) 75 - 100 MMHG    HCO3, Art 21.0 (L) 22 - 26 MMOL/L    POC O2 SAT 94.4 (L) 95 - 98 %    BASE DEFICIT (POC) 3.4 mmol/L    POC José Antonio's Test Positive      POC Draw Site LEFT RADIAL      SPECIMEN TYPE,SPECTI ARTERIAL      Performed by: Erinn    POCT Blood Gas & Electrolytes    Collection Time: 05/21/22  3:04 AM   Result Value Ref Range    pH, Arterial, POC 7.38 7.35 - 7.45      pCO2, Arterial, POC 30.9 (L) 35 - 45 MMHG    pO2, Arterial, POC 74 (L) 75 - 100 MMHG    POC Sodium 124 (L) 136 - 145 MMOL/L    POC Potassium 4.0 3.5 - 5.1 MMOL/L    POC Ionized Calcium 1.08 (L) 1.12 - 1.32 mmol/L    BASE DEFICIT (POC) 5.9 mmol/L    HCO3, Mixed 18.4 (L) 22 - 26 MMOL/L    POC O2 SAT 95 %    Source ARTERIAL      Site LEFT RADIAL      POC José Antonio's Test Positive      DEVICE NASAL CANNULA      Performed by: [facility]     POC GFR  Cannot be calculated >60 ml/min/1.73m2    Glomerular Filtration Rate, POC Cannot be calculated >60 ml/min/1.73m2   Culture, Blood ID Sensitivity    Collection Time: 05/21/22  5:15 AM    Specimen: Blood   Result Value Ref Range    ACCESSION NUMBER, [id number]     Escherichia Coli Detected (A) NOTDET      KPC (CARBAPENEM RESISTANCE GENE) NOT DETECTED NOTDET      Interpretation        Gram negative kevin.  Identified by realtime PCR as E. coli   CBC with Auto Differential    Collection Time: 05/21/22 10:32 AM   Result Value Ref Range    WBC 9.3 4.3 - 11.1 K/uL    RBC 3.99 (L) 4.23 - 5.6 M/uL    Hemoglobin 10.3 (L) 13.6 - 17.2 g/dL    Hematocrit 32.3 (L) 41.1 - 50.3 %    MCV 81.0 79.6 - 97.8 FL    MCH 25.8 (L) 26.1 - 32.9 PG    MCHC 31.9 31.4 - 35.0 g/dL    RDW 14.6 11.9 - 14.6 %    Platelets 280 (L) 266 - 450 K/uL    MPV 11.1 9.4 - 12.3 FL    nRBC 0.00 0.0 - 0.2 K/uL    Differential Type PENDING    POCT Glucose    Collection Time: 05/21/22 11:20 AM   Result Value Ref Range    POC Glucose 383 (H) 65 - 100 mg/dL    Performed by: Júnior    POCT Glucose    Collection Time: 05/21/22  1:30 PM   Result Value Ref Range    POC Glucose 396 (H) 65 - 100 mg/dL    Performed by: Júnior        @[facility]@    Other Studies:  [unfilled]    Current Meds:  Current Facility-Administered Medications   Medication Dose Route Frequency    ketorolac (TORADOL) injection 30 mg  30 mg IntraVENous NOW    [START ON 5/22/2022] azithromycin (ZITHROMAX) 500 mg in sodium chloride 0.9 % 250 mL IVPB ([id number])  500 mg IntraVENous Q24H    cefTRIAXone (ROCEPHIN) 1000 mg IVPB in NS 50ml minibag  1,000 mg IntraVENous Q24H    0.9 % sodium chloride infusion   IntraVENous Continuous    glucose chewable tablet 16 g  4 tablet Oral PRN    dextrose bolus 10% 125 mL  125 mL IntraVENous PRN    Or    dextrose bolus 10% 250 mL  250 mL IntraVENous PRN    insulin lispro (HUMALOG) injection vial 0-16 Units  0-16 Units SubCUTAneous TID WC       Signed:  Alison Goldsmith, LESLY - CNP    Part of this note may have been written by using a voice dictation software. The note has been proof read but may still contain some grammatical/other typographical errors.

## 2022-05-21 NOTE — ED NOTES
[place] [facility] [place] [facility] of care at this time. Transport notified to take patient to room.       Angel Torres RN  05/21/22 0168

## 2022-05-21 NOTE — FLOWSHEET NOTE
05/21/22 0730   Dual Clinician Skin Assessment   Dual Skin Assessment (4 Eyes) WDL   Second Clinical  (First and Last Name) [employer], rn   Skin Integumentary    Skin Integumentary (WDL) X   Skin Color Pale   Skin Condition/Temp Warm;Dry;Poor turgor   Skin Integrity Scars (comment); Excoriation  (scars on sacrum and RLE, excoriation on scrotum)   Wound Prevention/Protection Method Yes   Nails WDL   Location of Wound Prevention Sacrum   Orientation of Wound Prevention Posterior   Wound Offloading (Prevention Methods) Foam silicone   Dressing Present  Yes   Skin Assessed Underneath Dressing This Shift Yes     Patient has right BKA

## 2022-05-21 NOTE — FLOWSHEET NOTE
05/21/22 0730   Dual Clinician Skin Assessment   Dual Skin Assessment (4 Eyes) WDL   Second Clinical  (First and Last Name) Priya Drew rn   Skin Integumentary    Skin Integumentary (WDL) X   Skin Color Pale   Skin Condition/Temp Warm;Dry;Poor turgor   Skin Integrity Scars (comment); Excoriation  (scars on sacrum and RLE, excoriation on scrotum)   Wound Prevention/Protection Method Yes   Nails WDL   Location of Wound Prevention Sacrum   Orientation of Wound Prevention Posterior   Wound Offloading (Prevention Methods) Foam silicone   Dressing Present  Yes   Skin Assessed Underneath Dressing This Shift Yes

## 2022-05-22 ENCOUNTER — APPOINTMENT (OUTPATIENT)
Dept: ULTRASOUND IMAGING | Age: 42
DRG: 871 | End: 2022-05-22
Payer: MEDICARE

## 2022-05-22 LAB
ANION GAP SERPL CALC-SCNC: 9 MMOL/L (ref 7–16)
BUN SERPL-MCNC: 30 MG/DL (ref 6–23)
CALCIUM SERPL-MCNC: 9 MG/DL (ref 8.3–10.4)
CHLORIDE SERPL-SCNC: 102 MMOL/L (ref 98–107)
CO2 SERPL-SCNC: 23 MMOL/L (ref 21–32)
CREAT SERPL-MCNC: 1.5 MG/DL (ref 0.8–1.5)
EKG ATRIAL RATE: 144 BPM
EKG DIAGNOSIS: NORMAL
EKG P AXIS: 63 DEGREES
EKG P-R INTERVAL: 134 MS
EKG Q-T INTERVAL: 264 MS
EKG QRS DURATION: 86 MS
EKG QTC CALCULATION (BAZETT): 408 MS
EKG R AXIS: 59 DEGREES
EKG T AXIS: 14 DEGREES
EKG VENTRICULAR RATE: 144 BPM
GLUCOSE BLD STRIP.AUTO-MCNC: 231 MG/DL (ref 65–100)
GLUCOSE BLD STRIP.AUTO-MCNC: 315 MG/DL (ref 65–100)
GLUCOSE BLD STRIP.AUTO-MCNC: 319 MG/DL (ref 65–100)
GLUCOSE BLD STRIP.AUTO-MCNC: 356 MG/DL (ref 65–100)
GLUCOSE SERPL-MCNC: 217 MG/DL (ref 65–100)
POTASSIUM SERPL-SCNC: 3.9 MMOL/L (ref 3.5–5.1)
PROCALCITONIN SERPL-MCNC: 44.1 NG/ML (ref 0–0.49)
SERVICE CMNT-IMP: ABNORMAL
SODIUM SERPL-SCNC: 134 MMOL/L (ref 136–145)

## 2022-05-22 PROCEDURE — 2580000003 HC RX 258: Performed by: INTERNAL MEDICINE

## 2022-05-22 PROCEDURE — 87106 FUNGI IDENTIFICATION YEAST: CPT

## 2022-05-22 PROCEDURE — 6360000002 HC RX W HCPCS: Performed by: FAMILY MEDICINE

## 2022-05-22 PROCEDURE — 76775 US EXAM ABDO BACK WALL LIM: CPT

## 2022-05-22 PROCEDURE — 6370000000 HC RX 637 (ALT 250 FOR IP): Performed by: NURSE PRACTITIONER

## 2022-05-22 PROCEDURE — 87086 URINE CULTURE/COLONY COUNT: CPT

## 2022-05-22 PROCEDURE — 6360000002 HC RX W HCPCS: Performed by: INTERNAL MEDICINE

## 2022-05-22 PROCEDURE — 1100000003 HC PRIVATE W/ TELEMETRY

## 2022-05-22 PROCEDURE — 6360000002 HC RX W HCPCS: Performed by: NURSE PRACTITIONER

## 2022-05-22 PROCEDURE — 2580000003 HC RX 258: Performed by: NURSE PRACTITIONER

## 2022-05-22 PROCEDURE — 84145 PROCALCITONIN (PCT): CPT

## 2022-05-22 PROCEDURE — 87040 BLOOD CULTURE FOR BACTERIA: CPT

## 2022-05-22 PROCEDURE — 80048 BASIC METABOLIC PNL TOTAL CA: CPT

## 2022-05-22 PROCEDURE — 36415 COLL VENOUS BLD VENIPUNCTURE: CPT

## 2022-05-22 PROCEDURE — 82962 GLUCOSE BLOOD TEST: CPT

## 2022-05-22 RX ORDER — INSULIN GLARGINE 100 [IU]/ML
5 INJECTION, SOLUTION SUBCUTANEOUS NIGHTLY
Status: DISCONTINUED | OUTPATIENT
Start: 2022-05-22 | End: 2022-05-23

## 2022-05-22 RX ORDER — LISINOPRIL 5 MG/1
10 TABLET ORAL DAILY
Status: DISCONTINUED | OUTPATIENT
Start: 2022-05-22 | End: 2022-05-22

## 2022-05-22 RX ORDER — METOPROLOL TARTRATE 50 MG/1
50 TABLET, FILM COATED ORAL 2 TIMES DAILY
Status: DISCONTINUED | OUTPATIENT
Start: 2022-05-22 | End: 2022-05-28 | Stop reason: HOSPADM

## 2022-05-22 RX ORDER — MORPHINE SULFATE 2 MG/ML
1 INJECTION, SOLUTION INTRAMUSCULAR; INTRAVENOUS ONCE
Status: COMPLETED | OUTPATIENT
Start: 2022-05-22 | End: 2022-05-22

## 2022-05-22 RX ADMIN — INSULIN GLARGINE 5 UNITS: 100 INJECTION, SOLUTION SUBCUTANEOUS at 21:25

## 2022-05-22 RX ADMIN — INSULIN LISPRO 12 UNITS: 100 INJECTION, SOLUTION INTRAVENOUS; SUBCUTANEOUS at 17:20

## 2022-05-22 RX ADMIN — HEPARIN SODIUM 5000 UNITS: 5000 INJECTION INTRAVENOUS; SUBCUTANEOUS at 17:20

## 2022-05-22 RX ADMIN — METOPROLOL TARTRATE 50 MG: 50 TABLET ORAL at 21:41

## 2022-05-22 RX ADMIN — INSULIN LISPRO 12 UNITS: 100 INJECTION, SOLUTION INTRAVENOUS; SUBCUTANEOUS at 21:25

## 2022-05-22 RX ADMIN — MORPHINE SULFATE 1 MG: 2 INJECTION, SOLUTION INTRAMUSCULAR; INTRAVENOUS at 05:27

## 2022-05-22 RX ADMIN — AZITHROMYCIN MONOHYDRATE 500 MG: 500 INJECTION, POWDER, LYOPHILIZED, FOR SOLUTION INTRAVENOUS at 03:42

## 2022-05-22 RX ADMIN — LISINOPRIL 10 MG: 5 TABLET ORAL at 13:00

## 2022-05-22 RX ADMIN — HEPARIN SODIUM 5000 UNITS: 5000 INJECTION INTRAVENOUS; SUBCUTANEOUS at 23:24

## 2022-05-22 RX ADMIN — METOPROLOL TARTRATE 50 MG: 50 TABLET ORAL at 13:00

## 2022-05-22 RX ADMIN — CEFTRIAXONE 1000 MG: 1 INJECTION, POWDER, FOR SOLUTION INTRAMUSCULAR; INTRAVENOUS at 12:58

## 2022-05-22 RX ADMIN — INSULIN LISPRO 16 UNITS: 100 INJECTION, SOLUTION INTRAVENOUS; SUBCUTANEOUS at 12:22

## 2022-05-22 ASSESSMENT — PAIN - FUNCTIONAL ASSESSMENT: PAIN_FUNCTIONAL_ASSESSMENT: ACTIVITIES ARE NOT PREVENTED

## 2022-05-22 ASSESSMENT — PAIN DESCRIPTION - LOCATION: LOCATION: OTHER (COMMENT)

## 2022-05-22 ASSESSMENT — PAIN SCALES - GENERAL
PAINLEVEL_OUTOF10: 0
PAINLEVEL_OUTOF10: 0
PAINLEVEL_OUTOF10: 10
PAINLEVEL_OUTOF10: 0

## 2022-05-22 ASSESSMENT — PAIN DESCRIPTION - DESCRIPTORS: DESCRIPTORS: ACHING

## 2022-05-22 ASSESSMENT — PAIN DESCRIPTION - PAIN TYPE: TYPE: ACUTE PAIN

## 2022-05-22 NOTE — PLAN OF CARE
Problem: Discharge Planning  Goal: Discharge to home or other facility with appropriate resources  Outcome: Progressing     Problem: Safety - Adult  Goal: Free from fall injury  Outcome: Progressing     Problem: Confusion  Goal: Confusion, delirium, dementia, or psychosis is improved or at baseline  Description: INTERVENTIONS:  1. Assess for possible contributors to thought disturbance, including medications, impaired vision or hearing, underlying metabolic abnormalities, dehydration, psychiatric diagnoses, and notify attending LIP  2. [place] high risk fall precautions, as indicated  3. Provide frequent short contacts to provide reality reorientation, refocusing and direction  4. Decrease environmental stimuli, including noise as appropriate  5. Monitor and intervene to maintain adequate nutrition, hydration, elimination, sleep and activity  6. If unable to ensure safety without constant attention obtain sitter and review sitter guidelines with assigned personnel  7.  Initiate Psychosocial CNS and Spiritual Care consult, as indicated  Outcome: Progressing     Problem: Pain  Goal: Verbalizes/displays adequate comfort level or baseline comfort level  Outcome: Progressing

## 2022-05-22 NOTE — CONSULTS
Nephrology consult    Admission Date:  5/21/2022    Admission Diagnosis  Sepsis [facility]) [A41.9]    Consult request by Shae Guardian    History of Present Illness:    Valerie Caldwell is an 36 y.o. male who is being evaluated for acute kidney injury. He has a history of spina bifida, right below the knee amputation, hypertension, hepatic steatosis. He was sent to the emergency department with altered mental status and concerns for sepsis with a fever of 103. 3. He says his kidneys were hurting and the urine did have some purulence associated. He also had an infiltrate on his chest x-ray. Blood urine cultures obtained and antibiotics administered as well as some IV fluid. Initial creatinine was 3.1 with a baseline of 1.03. Ultrasound showed bilateral kidneys with no obstructions stones cysts or masses. Urinalysis was very concentrated with a specific gravity 1.030. Was also a few granular casts and many white cells     Today labs obtained and the creatinine is 1.5. He feels significantly better is able to eat and drink okay without difficulty. He does report some bladder spasm which has been going on for many years and he takes Ditropan      No fever, chills, sweats, epistaxis, vision changes, headache, shortness of breath, wheezing,  chest pain, palpitations. No nausea/vomitting, diarrhea or constipation. No dysuria, hematuria. No paresthesia, seizure history, no arthritis/ arthralgia or skin rashes. Allergies   Allergen Reactions    Latex        No current facility-administered medications on file prior to encounter. No current outpatient medications on file prior to encounter. There is no immunization history on file for this patient.       Past Medical History:   Diagnosis Date    Depression     GERD (gastroesophageal reflux disease)     Heart disease     Hypertension     Neurogenic bladder, NOS     Spina bifida ([address]     Spina bifida without mention of hydrocephalus, unspecified region     Stress incontinence, male     Toxic effect of latex(989.82)     Urinary tract infection, site not specified      Past Surgical History:   Procedure Laterality Date    ORTHOPEDIC SURGERY Right     foot surgery    UROLOGICAL SURGERY      artificial urinary sphincter         History reviewed. No pertinent family history. Social History     Tobacco Use    Smoking status: Former Smoker    Smokeless tobacco: Former User   Substance Use Topics    Alcohol use: No    Drug use: Not on file         Review of Systems  Remainder negative unless included in HPI      Objective:     Vitals:    05/21/22 2346 05/22/22 0339 05/22/22 0441 05/22/22 0853   BP: (!) 141/100 (!) 153/86  (!) 141/97   Pulse: 114 126 120 113   Resp: 12 20 20   Temp: 98.9 °F (37.2 °C) 100.4 °F (38 °C)  99.1 °F (37.3 °C)   TempSrc: Oral Oral  Oral   SpO2: 99% 96%  98%     No intake or output data in the 24 hours ending 05/22/22 1204    Physical Exam  General appearance: no distress, appears stated age  Head: atraumatic  Eyes: conjunctivae/corneas clear. Nose: no discharge  Throat: Lips, mucosa, and tongue normal.   Neck: supple, symmetrical, trachea midline and no JVD  Lungs: clear to auscultation bilaterally  Heart: regular rate and rhythm, S1, S2, no pericardial friction rub  Abdomen: soft, non-tender. Bowel sounds normal.  Obese  Extremities: No edema right BKA, no edema  Skin:  No rashes or lesions        Data Review:   Recent Labs     05/20/22  1706 05/21/22  1032   WBC 10.2 9.3   HGB 10.7* 10.3*   HCT 33.8* 32.3*   * 129*     Recent Labs     05/20/22  1706 05/22/22  0551   * 134*   K 3.9 3.9   CL 96* 102   CO2 25 23   BUN 35* 30*     No results for input(s): PH, PCO2, PO2, PCO2 in the last 72 hours.     Problem List:     Patient Active Problem List    Diagnosis Date Noted    Sepsis ([address] 05/21/2022    JENNIFER (acute kidney injury) ([facility] 05/20/2022    Hx of right BKA ([facility] 05/20/2022    Thrombocytopenia ([facility] 05/20/2022    Spina bifida ([facility] 05/20/2022    GERD (gastroesophageal reflux disease) 05/20/2022    HTN (hypertension) 05/20/2022    Hepatic steatosis 05/20/2022    Severe obesity (BMI 35.0-35.9 with comorbidity) ([facility] 05/20/2022    Morbid obesity with BMI of 50.0-59.9, adult ([facility] 05/20/2022    Fever 05/20/2022    Sinus tachycardia 05/20/2022    Normocytic anemia 05/20/2022    Severe sepsis ([facility] 05/20/2022    Acute metabolic encephalopathy 97/75/7249    Incontinence 10/28/2014           Plan:     JENNIFER in the setting of UTI/pneumonia and secondary sepsis.  -Ultrasound with no obstructions  -Urine with some granular casts but very concentrated specimen. He could have prerenal disease and or component of ATN. That he is already responded to therapy with IV fluids suggest more prerenal component. I do not see any sign of a Comella nephritis or other intrinsic pathology. Will see his labs in the morning. I did asked him to try to drink an extra glass or 2 of liquid today.   He asked for cranberry juice in the morning

## 2022-05-22 NOTE — PROGRESS NOTES
Hospitalist Progress Note   Admit Date:  2022  1:58 AM   Name:  Debra Alexandre. Age:  36 y.o. Sex:  male  :  1980   MRN:  [id number]   Room:  [facility]/    Presenting Complaint: Blood Infection    Reason(s) for Admission: Sepsis [facility]) [A41.9]     Hospital Course & Interval History:   Patient is a 36year old CM with a PMH of DM, HTN, GERD, RBKA, obesity who presented to the ER from rehab facility with an altered mentation. UA positive for UTI. CXR suggested left lung infiltrate. Pneumonia is not excluded. Subjective/24hr Events (22): Denies CP, chills, fever, dysuria. Patient does endorse some mild SOB     ROS:  10 systems reviewed and negative except as noted above. Assessment & Plan:   Sepsis  22  -Patient with fever, tachypnea +UTI and suspected infiltrates  -Continue empiric Rocephin, Azithromycin with cultures pending  -Procal 44.10; follow  -Patient is on home dosing of 4L O2 via NC    UTI  CAP  -Causing above. Treat as above     Acute Metabolic Encephalopathy  17  -Resolved; patient alert and oriented x 3     JENNIFER  22  -Cr now 1.50  Fr 3.10 yesterday;  1.03 21   -Renal US showing echogenicity of the left kidney. Diff dx includes ATN and acute glomerulonephritis  -Nephrology consult     Thrombocytopenia  -In the setting of sepsis  -Monitor for improvement     Spina Bifida  -Noted     GERD  -Will continue home meds once verified     HTN  -BP stable continue home meds once verified     Bipolar Disorder  -Depakote once verified dosing     DM II  22  Accu checks with SSI-A1c in a.m. Anemia   22  -Stable Hgb 10.3    Hepatic steatosis  -Lipid panel pending              Discharge Plannin-2 days     Diet:  ADULT DIET; Regular; 3 carb choices (45 gm/meal);  Low Sodium (2 gm)  DVT PPx: Heparin SQ   Code status: Full Code         Objective:     Patient Vitals for the past 24 hrs:   Temp Pulse Resp BP SpO2   22 0853 99.1 °F (37.3 °C) 113 20 (!) 141/97 98 %   05/22/22 0441 -- 120 -- -- --   05/22/22 0339 100.4 °F (38 °C) 126 20 (!) 153/86 96 %   05/21/22 2346 98.9 °F (37.2 °C) 114 12 (!) 141/100 99 %   05/21/22 2045 98.8 °F (37.1 °C) 115 18 139/79 94 %   05/21/22 1726 101.1 °F (38.4 °C) 135 28 (!) 153/75 99 %   05/21/22 1330 99.3 °F (37.4 °C) -- -- -- --   05/21/22 1214 102.2 °F (39 °C) 143 28 128/74 98 %     @BSHSIFLOW(2444:last)@    Estimated body mass index is 55.97 kg/m² as calculated from the following:    Height as of 5/20/22: 5' 9\" (1.753 m). Weight as of 5/20/22: 379 lb (171.9 kg). No intake or output data in the 24 hours ending 05/22/22 1046      Physical Exam:     Blood pressure (!) 141/97, pulse 113, temperature 99.1 °F (37.3 °C), temperature source Oral, resp. rate 20, SpO2 98 %. General:    Morbidly obese. No overt distress  Head:  Normocephalic, atraumatic  Eyes:  Sclerae appear normal.  Pupils equally round. ENT:  Nares appear normal, no drainage. Moist oral mucosa  Neck:  No restricted ROM. Trachea midline   CV:   RRR. No m/r/g. No jugular venous distension. Lungs:   CTAB. No wheezing, rhonchi, or rales. Respirations even, unlabored  Abdomen: Bowel sounds present. Soft, nontender, nondistended. Extremities: No cyanosis or clubbing. No edema; RBKA  Skin:     No rashes and normal coloration. Warm and dry. Neuro:  CN II-XII grossly intact. A&Ox3  Psych:  Normal mood and affect.       I have reviewed ordered lab tests and independently visualized imaging below:    Recent Labs:  Recent Results (from the past 48 hour(s))   Comprehensive Metabolic Panel    Collection Time: 05/20/22  5:06 PM   Result Value Ref Range    Sodium 131 (L) 138 - 145 mmol/L    Potassium 3.9 3.5 - 5.1 mmol/L    Chloride 96 (L) 98 - 107 mmol/L    CO2 25 21 - 32 mmol/L    Anion Gap 10 7 - 16 mmol/L    Glucose 267 (H) 65 - 100 mg/dL    BUN 35 (H) 6 - 23 MG/DL    CREATININE 3.10 (H) 0.8 - 1.5 MG/DL    GFR African American 28 (L) >60 ml/min/1.73m2 EGFR IF NonAfrican American 23 (L) >60 ml/min/1.73m2    Calcium 8.7 8.3 - 10.4 MG/DL    Total Bilirubin 0.4 0.2 - 1.1 MG/DL    ALT 21 12 - 65 U/L    AST 13 (L) 15 - 37 U/L    Alkaline Phosphatase 34 (L) 50 - 136 U/L    Total Protein 7.2 6.3 - 8.2 g/dL    Albumin 2.6 (L) 3.5 - 5.0 g/dL    Globulin 4.6 (H) 2.3 - 3.5 g/dL    Albumin/Globulin Ratio 0.6 (L) 1.2 - 3.5     CBC with Auto Differential    Collection Time: 05/20/22  5:06 PM   Result Value Ref Range    WBC 10.2 4.3 - 11.1 K/uL    RBC 4.07 (L) 4.23 - 5.6 M/uL    Hemoglobin 10.7 (L) 13.6 - 17.2 g/dL    Hematocrit 33.8 (L) 41.1 - 50.3 %    MCV 83.0 79.6 - 97.8 FL    MCH 26.3 26.1 - 32.9 PG    MCHC 31.7 31.4 - 35.0 g/dL    RDW 14.4 11.9 - 14.6 %    Platelets 550 (L) 491 - 450 K/uL    MPV 10.8 9.4 - 12.3 FL    NRBC Absolute 0.00 0.0 - 0.2 K/uL    Differential Type AUTOMATED      Neutrophils % 73 43 - 78 %    Lymphocytes % 13 13 - 44 %    Monocytes % 13 (H) 4.0 - 12.0 %    Eosinophils % 0 (L) 0.5 - 7.8 %    Basophils % 0 0.0 - 2.0 %    Immature Granulocytes 1 0.0 - 5.0 %    Neutrophils Absolute 7.4 1.7 - 8.2 K/UL    Lymphocytes Absolute 1.3 0.5 - 4.6 K/UL    Monocytes Absolute 1.3 0.1 - 1.3 K/UL    Eosinophils Absolute 0.0 0.0 - 0.8 K/UL    Basophils Absolute 0.0 0.0 - 0.2 K/UL    GRANULOCYTE ABSOLUTE COUNT 0.1 0.0 - 0.5 K/UL   Lactic Acid    Collection Time: 05/20/22  5:06 PM   Result Value Ref Range    Lactic Acid 2.0 0.4 - 2.0 MMOL/L   Urinalysis W/ Rflx Microscopic    Collection Time: 05/20/22  7:58 PM   Result Value Ref Range    Color, UA YELLOW      Clarity, UA TURBID      Specific Gravity, UA >1.030 (H) 1.001 - 1.023 NA    pH, UA 5.5 5.0 - 9.0      Protein,  (A) NEG mg/dL    Glucose, Ur 250 mg/dL    Ketones, Urine Negative NEG mg/dL    Bilirubin, Urine Negative NEG      Blood, Urine MODERATE (A) NEG      Urobilinogen, UA, POCT 0.2 0.2 - 1.0 EU/dL    Nitrite, Urine Negative NEG      Leukocyte Esterase, Urine MODERATE (A) NEG     URINE MICROSCOPIC Collection Time: 05/20/22  7:58 PM   Result Value Ref Range    WBC, UA >100 0 /hpf    RBC, UA 0-3 0 /hpf    Epithelial Cells, UA 0-3 0 /hpf    BACTERIA, URINE 2+ (H) 0 /hpf    Casts UA GRANULAR 0 /lpf    Crystals, UA 0 0 /LPF    MUCUS, URINE 0 0 /lpf    OTHER OBSERVATIONS RESULTS VERIFIED MANUALLY     Ammonia    Collection Time: 05/20/22 10:31 PM   Result Value Ref Range    Ammonia 15 11 - 32 UMOL/L   Respiratory Virus Panel W/COVID-19, PCR    Collection Time: 05/20/22 10:31 PM    Specimen: Nasopharyngeal   Result Value Ref Range    Adenovirus NOT DETECTED NOTDET      CORONAVIRUS 229E NOT DETECTED NOTDET      CORONAVIRUS HKU1 NOT DETECTED NOTDET      CORONAVIRUS NL63 NOT DETECTED NOTDET      CORONAVIRUS OC43 NOT DETECTED NOTDET      SARS-COV-2 PCR, COVPCR NOT DETECTED NOTDET      Metapneumovirus NOT DETECTED NOTDET      Rhinovirus Enterovirus PCR NOT DETECTED NOTDET      INFLUENZA A NOT DETECTED NOTDET      INFLUENZA B NOT DETECTED NOTDET      Parainfluenza 1 NOT DETECTED NOTDET      Parainfluenza 2 NOT DETECTED NOTDET      Parainfluenza 3 NOT DETECTED NOTDET      PARAINFLUENZA 4 NOT DETECTED NOTDET      RSV by PCR NOT DETECTED NOTDET      BORDETELLA PARAPERTUSSIS PCR, BORPAR NOT DETECTED NOTDET      Bordetella pertussis by PCR NOT DETECTED NOTDET      Chlamydophilia pneumoniae by PCR NOT DETECTED NOTDET      Mycoplasma pneumoniae by PCR NOT DETECTED NOTDET     BLOOD GAS, ARTERIAL POC    Collection Time: 05/20/22 11:01 PM   Result Value Ref Range    POC Device NASAL CANNULA      pH, Art 7.39 7.35 - 7.45      pCO2, Art 34.6 (L) 35 - 45 MMHG    pO2, Art 72 (L) 75 - 100 MMHG    HCO3, Art 21.0 (L) 22 - 26 MMOL/L    POC O2 SAT 94.4 (L) 95 - 98 %    BASE DEFICIT (POC) 3.4 mmol/L    POC José Antonio's Test Positive      POC Draw Site LEFT RADIAL      SPECIMEN TYPE,SPECTI ARTERIAL      Performed by: Erinn    POCT Blood Gas & Electrolytes    Collection Time: 05/21/22  3:04 AM   Result Value Ref Range    pH, Arterial, POC 7.38 7.35 - 7.45      pCO2, Arterial, POC 30.9 (L) 35 - 45 MMHG    pO2, Arterial, POC 74 (L) 75 - 100 MMHG    POC Sodium 124 (L) 136 - 145 MMOL/L    POC Potassium 4.0 3.5 - 5.1 MMOL/L    POC Ionized Calcium 1.08 (L) 1.12 - 1.32 mmol/L    BASE DEFICIT (POC) 5.9 mmol/L    HCO3, Mixed 18.4 (L) 22 - 26 MMOL/L    POC O2 SAT 95 %    Source ARTERIAL      Site LEFT RADIAL      POC José Antonio's Test Positive      DEVICE NASAL CANNULA      Performed by: Erinn     POC GFR  Cannot be calculated >60 ml/min/1.73m2    Glomerular Filtration Rate, POC Cannot be calculated >60 ml/min/1.73m2   Culture, Blood ID Sensitivity    Collection Time: 05/21/22  5:15 AM    Specimen: Blood   Result Value Ref Range    ACCESSION NUMBER, [id number]     Escherichia Coli Detected (A) NOTDET      KPC (CARBAPENEM RESISTANCE GENE) NOT DETECTED NOTDET      Interpretation        Gram negative kevin.  Identified by realtime PCR as E. coli   CBC with Auto Differential    Collection Time: 05/21/22 10:32 AM   Result Value Ref Range    WBC 9.3 4.3 - 11.1 K/uL    RBC 3.99 (L) 4.23 - 5.6 M/uL    Hemoglobin 10.3 (L) 13.6 - 17.2 g/dL    Hematocrit 32.3 (L) 41.1 - 50.3 %    MCV 81.0 79.6 - 97.8 FL    MCH 25.8 (L) 26.1 - 32.9 PG    MCHC 31.9 31.4 - 35.0 g/dL    RDW 14.6 11.9 - 14.6 %    Platelets 775 (L) 946 - 450 K/uL    MPV 11.1 9.4 - 12.3 FL    nRBC 0.00 0.0 - 0.2 K/uL    Differential Type PENDING    EKG 12 Lead    Collection Time: 05/21/22 10:49 AM   Result Value Ref Range    Ventricular Rate 144 BPM    Atrial Rate 144 BPM    P-R Interval 134 ms    QRS Duration 86 ms    Q-T Interval 264 ms    QTc Calculation (Bazett) 408 ms    P Axis 63 degrees    R Axis 59 degrees    T Axis 14 degrees    Diagnosis Sinus tachycardia    POCT Glucose    Collection Time: 05/21/22 11:20 AM   Result Value Ref Range    POC Glucose 383 (H) 65 - 100 mg/dL    Performed by: Júnior    POCT Glucose    Collection Time: 05/21/22  1:30 PM Result Value Ref Range    POC Glucose 396 (H) 65 - 100 mg/dL    Performed by: Júnior    POCT Glucose    Collection Time: 05/21/22  4:41 PM   Result Value Ref Range    POC Glucose 338 (H) 65 - 100 mg/dL    Performed by: Júnior    POCT Glucose    Collection Time: 05/21/22 10:04 PM   Result Value Ref Range    POC Glucose 315 (H) 65 - 100 mg/dL    Performed by:  Chavo    Basic Metabolic Panel    Collection Time: 05/22/22  5:51 AM   Result Value Ref Range    Sodium 134 (L) 136 - 145 mmol/L    Potassium 3.9 3.5 - 5.1 mmol/L    Chloride 102 98 - 107 mmol/L    CO2 23 21 - 32 mmol/L    Anion Gap 9 7 - 16 mmol/L    Glucose 217 (H) 65 - 100 mg/dL    BUN 30 (H) 6 - 23 MG/DL    CREATININE 1.50 0.8 - 1.5 MG/DL    GFR African American >60 >60 ml/min/1.73m2    GFR Non- 54 (L) >60 ml/min/1.73m2    Calcium 9.0 8.3 - 10.4 MG/DL   Procalcitonin    Collection Time: 05/22/22  5:51 AM   Result Value Ref Range    Procalcitonin 44.10 (H) 0.00 - 0.49 ng/mL   POCT Glucose    Collection Time: 05/22/22  7:45 AM   Result Value Ref Range    POC Glucose 231 (H) 65 - 100 mg/dL    Performed by: Júnior        [unfilled]    Other Studies:  [unfilled]    Current Meds:  Current Facility-Administered Medications   Medication Dose Route Frequency    azithromycin (ZITHROMAX) 500 mg in sodium chloride 0.9 % 250 mL IVPB ([id number])  500 mg IntraVENous Q24H    cefTRIAXone (ROCEPHIN) 1000 mg IVPB in NS 50ml minibag  1,000 mg IntraVENous Q24H    glucose chewable tablet 16 g  4 tablet Oral PRN    dextrose bolus 10% 125 mL  125 mL IntraVENous PRN    Or    dextrose bolus 10% 250 mL  250 mL IntraVENous PRN    insulin lispro (HUMALOG) injection vial 0-16 Units  0-16 Units SubCUTAneous 4x Daily AC & HS    heparin (porcine) injection 5,000 Units  5,000 Units SubCUTAneous q8h    acetaminophen (TYLENOL) tablet 650 mg  650 mg Oral Q6H PRN       Signed:  LESLY Blank - CNP    Part of this note may have been written by using a voice dictation software. The note has been proof read but may still contain some grammatical/other typographical errors.

## 2022-05-23 PROBLEM — J18.9 PNEUMONIA: Status: ACTIVE | Noted: 2022-05-23

## 2022-05-23 PROBLEM — I10 HTN (HYPERTENSION): Status: ACTIVE | Noted: 2022-05-20

## 2022-05-23 PROBLEM — Z89.511 HX OF RIGHT BKA (HCC): Status: ACTIVE | Noted: 2022-05-20

## 2022-05-23 PROBLEM — E66.01 SEVERE OBESITY (BMI 35.0-35.9 WITH COMORBIDITY) (HCC): Status: ACTIVE | Noted: 2022-05-20

## 2022-05-23 PROBLEM — G93.41 ACUTE METABOLIC ENCEPHALOPATHY: Status: ACTIVE | Noted: 2022-05-20

## 2022-05-23 PROBLEM — N39.0 UTI (URINARY TRACT INFECTION): Status: ACTIVE | Noted: 2022-05-23

## 2022-05-23 PROBLEM — E11.9 TYPE 2 DIABETES MELLITUS (HCC): Chronic | Status: ACTIVE | Noted: 2022-05-23

## 2022-05-23 PROBLEM — D69.6 THROMBOCYTOPENIA (HCC): Status: ACTIVE | Noted: 2022-05-20

## 2022-05-23 PROBLEM — Q05.9 SPINA BIFIDA (HCC): Status: ACTIVE | Noted: 2022-05-20

## 2022-05-23 PROBLEM — J96.11 CHRONIC RESPIRATORY FAILURE WITH HYPOXIA (HCC): Status: ACTIVE | Noted: 2022-05-23

## 2022-05-23 LAB
BACTERIA SPEC CULT: ABNORMAL
EST. AVERAGE GLUCOSE BLD GHB EST-MCNC: 260 MG/DL
GLUCOSE BLD STRIP.AUTO-MCNC: 223 MG/DL (ref 65–100)
GLUCOSE BLD STRIP.AUTO-MCNC: 226 MG/DL (ref 65–100)
GLUCOSE BLD STRIP.AUTO-MCNC: 228 MG/DL (ref 65–100)
GLUCOSE BLD STRIP.AUTO-MCNC: 303 MG/DL (ref 65–100)
GLUCOSE BLD STRIP.AUTO-MCNC: 377 MG/DL (ref 65–100)
GRAM STN SPEC: ABNORMAL
HBA1C MFR BLD: 10.7 % (ref 4.2–6.3)
PROCALCITONIN SERPL-MCNC: 22.43 NG/ML (ref 0–0.49)
SERVICE CMNT-IMP: ABNORMAL

## 2022-05-23 PROCEDURE — 6360000002 HC RX W HCPCS: Performed by: NURSE PRACTITIONER

## 2022-05-23 PROCEDURE — 6370000000 HC RX 637 (ALT 250 FOR IP): Performed by: NURSE PRACTITIONER

## 2022-05-23 PROCEDURE — 1100000003 HC PRIVATE W/ TELEMETRY

## 2022-05-23 PROCEDURE — 6370000000 HC RX 637 (ALT 250 FOR IP): Performed by: INTERNAL MEDICINE

## 2022-05-23 PROCEDURE — 2580000003 HC RX 258: Performed by: INTERNAL MEDICINE

## 2022-05-23 PROCEDURE — 36415 COLL VENOUS BLD VENIPUNCTURE: CPT

## 2022-05-23 PROCEDURE — 6360000002 HC RX W HCPCS: Performed by: FAMILY MEDICINE

## 2022-05-23 PROCEDURE — 2580000003 HC RX 258: Performed by: FAMILY MEDICINE

## 2022-05-23 PROCEDURE — 83036 HEMOGLOBIN GLYCOSYLATED A1C: CPT

## 2022-05-23 PROCEDURE — 6360000002 HC RX W HCPCS: Performed by: INTERNAL MEDICINE

## 2022-05-23 PROCEDURE — 84145 PROCALCITONIN (PCT): CPT

## 2022-05-23 PROCEDURE — 82962 GLUCOSE BLOOD TEST: CPT

## 2022-05-23 RX ORDER — INSULIN GLARGINE 100 [IU]/ML
10 INJECTION, SOLUTION SUBCUTANEOUS NIGHTLY
Status: DISCONTINUED | OUTPATIENT
Start: 2022-05-23 | End: 2022-05-24

## 2022-05-23 RX ADMIN — METOPROLOL TARTRATE 50 MG: 50 TABLET ORAL at 21:11

## 2022-05-23 RX ADMIN — HEPARIN SODIUM 5000 UNITS: 5000 INJECTION INTRAVENOUS; SUBCUTANEOUS at 06:39

## 2022-05-23 RX ADMIN — INSULIN LISPRO 12 UNITS: 100 INJECTION, SOLUTION INTRAVENOUS; SUBCUTANEOUS at 18:15

## 2022-05-23 RX ADMIN — AZITHROMYCIN MONOHYDRATE 500 MG: 500 INJECTION, POWDER, LYOPHILIZED, FOR SOLUTION INTRAVENOUS at 02:05

## 2022-05-23 RX ADMIN — INSULIN LISPRO 4 UNITS: 100 INJECTION, SOLUTION INTRAVENOUS; SUBCUTANEOUS at 21:11

## 2022-05-23 RX ADMIN — HEPARIN SODIUM 5000 UNITS: 5000 INJECTION INTRAVENOUS; SUBCUTANEOUS at 15:30

## 2022-05-23 RX ADMIN — INSULIN GLARGINE 10 UNITS: 100 INJECTION, SOLUTION SUBCUTANEOUS at 21:11

## 2022-05-23 RX ADMIN — INSULIN LISPRO 4 UNITS: 100 INJECTION, SOLUTION INTRAVENOUS; SUBCUTANEOUS at 07:30

## 2022-05-23 RX ADMIN — METOPROLOL TARTRATE 50 MG: 50 TABLET ORAL at 08:48

## 2022-05-23 RX ADMIN — INSULIN LISPRO 4 UNITS: 100 INJECTION, SOLUTION INTRAVENOUS; SUBCUTANEOUS at 11:48

## 2022-05-23 RX ADMIN — HEPARIN SODIUM 5000 UNITS: 5000 INJECTION INTRAVENOUS; SUBCUTANEOUS at 23:00

## 2022-05-23 RX ADMIN — CEFTRIAXONE 2000 MG: 2 INJECTION, POWDER, FOR SOLUTION INTRAMUSCULAR; INTRAVENOUS at 11:45

## 2022-05-23 ASSESSMENT — PAIN SCALES - GENERAL: PAINLEVEL_OUTOF10: 0

## 2022-05-23 NOTE — PROGRESS NOTES
Hospitalist Progress Note   Admit Date:  2022  1:58 AM   Name:  Allie Rosario. Age:  36 y.o. Sex:  male  :  1980   MRN:  [id number]   Room:  [facility]/    Presenting Complaint: Blood Infection    Reason(s) for Admission: Sepsis [facility]) [A41.9]     Hospital Course & Interval History:     Mr. Maricarmen Almaguer is a 37 yo male with PMH of spina bifida, living at longterm care facility, DM2, HTN, obesity, right BKA,  4 L home O2 requirement/ chronic hypoxic respiratory failure admitted with sepsis due to UTI/ pneumonia and acute metabolic encephalopathy. UA positive-no culture    COVID negative. CXR shows left base infiltrate. Blood cultures pending. He is on course of rocephin/ azithromycin. He had JENNIFER that has resolved with IVF. He has been seen by nephrology. US renal no acute issue. Discharge plans pending to home at long term facility. Subjective/24hr Events (22): Some dyspnea, cough, no paolo dysuria, making urine, some nausea, and diarrhea       Assessment & Plan:     Principal Problem:    Sepsis ([address]  Plan:   Due to pneumonia and UTI  · D3 rocephin and azithromycin  · followup cultures       Active Problems:    JENNIFER (acute kidney injury) ([facility]  Plan:   · Trend labs  · Stopped IVF      DM2:  · Increase  lantus to 10 units bedtime and continued sliding scale        HTN:  · Continued metoprolol      Anemia/n thrombocytopenia:  · followup HGB/ platelets      Chronic hypoxic respiratory failure:  · On home O2      Need to clarify home meds        Discharge Planning:      Pending to longterm care     Diet:  ADULT DIET; Regular; 3 carb choices (45 gm/meal);  Low Sodium (2 gm)  DVT PPx: heparin  Code status: No Order    Hospital Problems           Last Modified POA    * (Principal) Sepsis ([facility] 2022 Yes    UTI (urinary tract infection) 2022 Yes    Pneumonia 2022 Yes    Chronic respiratory failure with hypoxia ([facility] 2022 Yes    Type 2 diabetes mellitus [facility]) (Chronic) 5/23/2022 Yes    JENNIFER (acute kidney injury) ([facility] 5/21/2022 Yes    Hx of right BKA ([facility] 5/23/2022 Yes    Thrombocytopenia ([facility] 5/23/2022 Yes    Spina bifida ([facility] 5/23/2022 Yes    HTN (hypertension) 5/23/2022 Yes    Severe obesity (BMI 35.0-35.9 with comorbidity) ([facility] 5/23/2022 Yes    Acute metabolic encephalopathy 2/39/7427 Yes            Objective:     Patient Vitals for the past 24 hrs:   Temp Pulse Resp BP SpO2   05/23/22 1539 98.8 °F (37.1 °C) 103 18 (!) 138/99 98 %   05/23/22 1041 98.9 °F (37.2 °C) 98 18 (!) 155/92 96 %   05/23/22 0724 99 °F (37.2 °C) 110 18 (!) 154/95 99 %   05/23/22 0401 99.9 °F (37.7 °C) 92 19 (!) 158/94 95 %   05/22/22 2320 100.2 °F (37.9 °C) 98 19 (!) 150/85 96 %   05/22/22 1934 99.7 °F (37.6 °C) 102 19 (!) 114/103 100 %   05/22/22 1726 99 °F (37.2 °C) 105 20 (!) 158/99 99 %       Estimated body mass index is 44.26 kg/m² as calculated from the following:    Height as of 5/20/22: 5' 9\" (1.753 m). Weight as of this encounter: 299 lb 11.2 oz (135.9 kg). Intake/Output Summary (Last 24 hours) at 5/23/2022 1702  Last data filed at 5/23/2022 1412  Gross per 24 hour   Intake --   Output 400 ml   Net -400 ml         Physical Exam:     Blood pressure (!) 138/99, pulse 103, temperature 98.8 °F (37.1 °C), resp. rate 18, weight 299 lb 11.2 oz (135.9 kg), SpO2 98 %. General:    Well nourished. No overt distress, obese  CV:   RRR. No m/r/g. No jugular venous distension. No edema   Lungs:   CTAB. No wheezing, rhonchi, or rales. Respirations even, unlabored anterior   Abdomen: Bowel sounds present. Soft, nontender, nondistended. obese  Extremities: Right BKA  Skin:     No rashes and normal coloration. Warm and dry. Neuro:  grossly intact. Psych:  Normal mood and affect.       I have reviewed ordered lab tests and independently visualized imaging below:    Recent Labs:  Recent Results (from the past 48 hour(s))   POCT Glucose    Collection Time: 05/21/22 10:04 PM injection 5,000 Units  5,000 Units SubCUTAneous q8h    acetaminophen (TYLENOL) tablet 650 mg  650 mg Oral Q6H PRN       Signed:  Karsten Hopper MD    Part of this note may have been written by using a voice dictation software. The note has been proof read but may still contain some grammatical/other typographical errors.

## 2022-05-23 NOTE — PROGRESS NOTES
[facility] Nephrology    Follow-Up on:JENNIFER    HPI: Pt is feeling much better    ROS:  Denies CP, SOB. Current Facility-Administered Medications   Medication Dose Route Frequency    insulin glargine (LANTUS) injection vial 10 Units  10 Units SubCUTAneous Nightly    metoprolol tartrate (LOPRESSOR) tablet 50 mg  50 mg Oral BID    cefTRIAXone (ROCEPHIN) 2000 mg IVPB in NS 50ml minibag  2,000 mg IntraVENous Q24H    azithromycin (ZITHROMAX) 500 mg in sodium chloride 0.9 % 250 mL IVPB ([id number])  500 mg IntraVENous Q24H    glucose chewable tablet 16 g  4 tablet Oral PRN    dextrose bolus 10% 125 mL  125 mL IntraVENous PRN    Or    dextrose bolus 10% 250 mL  250 mL IntraVENous PRN    insulin lispro (HUMALOG) injection vial 0-16 Units  0-16 Units SubCUTAneous 4x Daily AC & HS    heparin (porcine) injection 5,000 Units  5,000 Units SubCUTAneous q8h    acetaminophen (TYLENOL) tablet 650 mg  650 mg Oral Q6H PRN       Exam:  Vitals:    05/23/22 0401 05/23/22 0724 05/23/22 1041 05/23/22 1539   BP: (!) 158/94 (!) 154/95 (!) 155/92 (!) 138/99   Pulse: 92 110 98 103   Resp: 19 18 18 18   Temp: 99.9 °F (37.7 °C) 99 °F (37.2 °C) 98.9 °F (37.2 °C) 98.8 °F (37.1 °C)   TempSrc: Oral Oral Oral    SpO2: 95% 99% 96% 98%   Weight:             Intake/Output Summary (Last 24 hours) at 5/23/2022 1747  Last data filed at 5/23/2022 1412  Gross per 24 hour   Intake --   Output 400 ml   Net -400 ml     PE:  GEN - in no distress  CV - regular, no murmur, no rub  Lung - clear bilaterally  Abd - soft, nontender  Ext - no edema    Labs  Recent Labs     05/21/22  1032   WBC 9.3   HGB 10.3*   HCT 32.3*   *     Recent Labs     05/22/22  0551   *   K 3.9      CO2 23   BUN 30*     No results for input(s): PH, PCO2, PO2, PCO2 in the last 72 hours.     Problem List:  Patient Active Problem List    Diagnosis Date Noted    UTI (urinary tract infection) 05/23/2022    Pneumonia 05/23/2022    Chronic respiratory failure with hypoxia ([facility] 05/23/2022    Type 2 diabetes mellitus ([facility] 05/23/2022    S[facility] 05/21/2022    JENNIFER (acute kidney injury) ([facility] 05/20/2022    Hx of right BKA ([facility] 05/20/2022    Thrombocytopenia ([facility] 05/20/2022    Spina bifida ([facility] 05/20/2022    GERD (gastroesophageal reflux disease) 05/20/2022    HTN (hypertension) 05/20/2022    Hepatic steatosis 05/20/2022    Severe obesity (BMI 35.0-35.9 with comorbidity) ([facility] 05/20/2022    Morbid obesity with BMI of 50.0-59.9, adult ([facility] 05/20/2022    Fever 05/20/2022    Sinus tachycardia 05/20/2022    Normocytic anemia 05/20/2022    Severe sepsis ([facility] 05/20/2022    Acute metabolic encephalopathy 69/76/4600    Incontinence 10/28/2014       Issues Addressed By Nephrology:    Plan:  1. JENNIFER  2. Volume depletion  3. Febrile illness  Overall improved. Nothing more to add, I will sign off thanks.

## 2022-05-23 NOTE — PROGRESS NOTES
Pt chart reviewed for discharge planning. Pt is a long term care medicaid resident at [facility] and he plans to return to this facility at discharge. Pt will require BLS transport back to SNF. CM will follow pt plan of care and assist with transition back to SNF pending pt clinical progress. Please consult case management if any additional needs arise. 05/23/22 0311   Service Assessment   Patient Orientation Alert and Oriented;Person;Place;Situation   Cognition Alert   History Provided By Patient   PCP Verified by CM Yes  (seen by MD at SNF)   Last Visit to PCP Within last 3 months   Prior Functional Level Assistance with the following:;Bathing;Dressing; Toileting;Mobility   Current Functional Level Assistance with the following:;Bathing;Dressing; Toileting;Mobility   Can patient return to prior living arrangement Yes   Ability to make needs known: Good   Social/Functional History   Occupation On disability   Discharge Planning   Type of [address] Other (Comment)  (Skilled nursing facility)   Patient expects to be discharged to: [facility]charge [facility] ([facility])   [address] Provided?  No   Mode of Transport at Discharge BLS   Confirm Follow Up Transport Other (see comment)  (facility coordinates any needed transport)

## 2022-05-24 ENCOUNTER — APPOINTMENT (OUTPATIENT)
Dept: CT IMAGING | Age: 42
DRG: 871 | End: 2022-05-24
Payer: MEDICARE

## 2022-05-24 PROBLEM — A41.51 SEPSIS DUE TO ESCHERICHIA COLI (E. COLI) (HCC): Status: ACTIVE | Noted: 2022-05-24

## 2022-05-24 LAB
ANION GAP SERPL CALC-SCNC: 13 MMOL/L (ref 7–16)
BACTERIA SPEC CULT: ABNORMAL
BACTERIA SPEC CULT: ABNORMAL
BASOPHILS # BLD: 0.1 K/UL (ref 0–0.2)
BASOPHILS NFR BLD: 1 % (ref 0–2)
BUN SERPL-MCNC: 24 MG/DL (ref 6–23)
CALCIUM SERPL-MCNC: 9.2 MG/DL (ref 8.3–10.4)
CHLORIDE SERPL-SCNC: 95 MMOL/L (ref 98–107)
CO2 SERPL-SCNC: 26 MMOL/L (ref 21–32)
CREAT SERPL-MCNC: 0.9 MG/DL (ref 0.8–1.5)
DIFFERENTIAL METHOD BLD: ABNORMAL
EOSINOPHIL # BLD: 0.2 K/UL (ref 0–0.8)
EOSINOPHIL NFR BLD: 2 % (ref 0.5–7.8)
ERYTHROCYTE [DISTWIDTH] IN BLOOD BY AUTOMATED COUNT: 14.7 % (ref 11.9–14.6)
GLUCOSE BLD STRIP.AUTO-MCNC: 230 MG/DL (ref 65–100)
GLUCOSE BLD STRIP.AUTO-MCNC: 252 MG/DL (ref 65–100)
GLUCOSE BLD STRIP.AUTO-MCNC: 263 MG/DL (ref 65–100)
GLUCOSE BLD STRIP.AUTO-MCNC: 285 MG/DL (ref 65–100)
GLUCOSE SERPL-MCNC: 253 MG/DL (ref 65–100)
HCT VFR BLD AUTO: 32.4 % (ref 41.1–50.3)
HGB BLD-MCNC: 10.2 G/DL (ref 13.6–17.2)
IMM GRANULOCYTES # BLD AUTO: 0.2 K/UL (ref 0–0.5)
IMM GRANULOCYTES NFR BLD AUTO: 3 % (ref 0–5)
LYMPHOCYTES # BLD: 1.6 K/UL (ref 0.5–4.6)
LYMPHOCYTES NFR BLD: 21 % (ref 13–44)
MCH RBC QN AUTO: 26.2 PG (ref 26.1–32.9)
MCHC RBC AUTO-ENTMCNC: 31.5 G/DL (ref 31.4–35)
MCV RBC AUTO: 83.1 FL (ref 79.6–97.8)
MONOCYTES # BLD: 0.8 K/UL (ref 0.1–1.3)
MONOCYTES NFR BLD: 10 % (ref 4–12)
NEUTS SEG # BLD: 4.9 K/UL (ref 1.7–8.2)
NEUTS SEG NFR BLD: 63 % (ref 43–78)
NRBC # BLD: 0 K/UL (ref 0–0.2)
PLATELET # BLD AUTO: 255 K/UL (ref 150–450)
PMV BLD AUTO: 10 FL (ref 9.4–12.3)
POTASSIUM SERPL-SCNC: 3.7 MMOL/L (ref 3.5–5.1)
RBC # BLD AUTO: 3.9 M/UL (ref 4.23–5.6)
SERVICE CMNT-IMP: ABNORMAL
SODIUM SERPL-SCNC: 134 MMOL/L (ref 138–145)
WBC # BLD AUTO: 7.8 K/UL (ref 4.3–11.1)

## 2022-05-24 PROCEDURE — 93005 ELECTROCARDIOGRAM TRACING: CPT | Performed by: INTERNAL MEDICINE

## 2022-05-24 PROCEDURE — 6370000000 HC RX 637 (ALT 250 FOR IP): Performed by: INTERNAL MEDICINE

## 2022-05-24 PROCEDURE — 6370000000 HC RX 637 (ALT 250 FOR IP): Performed by: NURSE PRACTITIONER

## 2022-05-24 PROCEDURE — 82962 GLUCOSE BLOOD TEST: CPT

## 2022-05-24 PROCEDURE — 2580000003 HC RX 258: Performed by: INTERNAL MEDICINE

## 2022-05-24 PROCEDURE — 80048 BASIC METABOLIC PNL TOTAL CA: CPT

## 2022-05-24 PROCEDURE — 6360000002 HC RX W HCPCS: Performed by: NURSE PRACTITIONER

## 2022-05-24 PROCEDURE — 6360000002 HC RX W HCPCS: Performed by: INTERNAL MEDICINE

## 2022-05-24 PROCEDURE — 94640 AIRWAY INHALATION TREATMENT: CPT

## 2022-05-24 PROCEDURE — 2700000000 HC OXYGEN THERAPY PER DAY

## 2022-05-24 PROCEDURE — 6370000000 HC RX 637 (ALT 250 FOR IP): Performed by: FAMILY MEDICINE

## 2022-05-24 PROCEDURE — 85025 COMPLETE CBC W/AUTO DIFF WBC: CPT

## 2022-05-24 PROCEDURE — 74176 CT ABD & PELVIS W/O CONTRAST: CPT

## 2022-05-24 PROCEDURE — 1100000003 HC PRIVATE W/ TELEMETRY

## 2022-05-24 PROCEDURE — 36415 COLL VENOUS BLD VENIPUNCTURE: CPT

## 2022-05-24 RX ORDER — NALOXONE HYDROCHLORIDE 4 MG/.1ML
1 SPRAY NASAL PRN
COMMUNITY

## 2022-05-24 RX ORDER — TRAZODONE HYDROCHLORIDE 50 MG/1
50 TABLET ORAL NIGHTLY PRN
Status: DISCONTINUED | OUTPATIENT
Start: 2022-05-24 | End: 2022-05-28 | Stop reason: HOSPADM

## 2022-05-24 RX ORDER — FLUTICASONE PROPIONATE 50 MCG
2 SPRAY, SUSPENSION (ML) NASAL DAILY
COMMUNITY

## 2022-05-24 RX ORDER — CLONAZEPAM 0.5 MG/1
0.5 TABLET ORAL 2 TIMES DAILY PRN
Status: DISCONTINUED | OUTPATIENT
Start: 2022-05-24 | End: 2022-05-28 | Stop reason: HOSPADM

## 2022-05-24 RX ORDER — METHENAMINE HIPPURATE 1000 MG/1
1 TABLET ORAL EVERY 12 HOURS
COMMUNITY

## 2022-05-24 RX ORDER — INSULIN ASPART 100 [IU]/ML
INJECTION, SOLUTION INTRAVENOUS; SUBCUTANEOUS
COMMUNITY

## 2022-05-24 RX ORDER — LISINOPRIL 5 MG/1
10 TABLET ORAL DAILY
Status: DISCONTINUED | OUTPATIENT
Start: 2022-05-24 | End: 2022-05-28 | Stop reason: HOSPADM

## 2022-05-24 RX ORDER — ESCITALOPRAM OXALATE 20 MG/1
15 TABLET ORAL DAILY
COMMUNITY

## 2022-05-24 RX ORDER — FENOFIBRATE 160 MG/1
160 TABLET ORAL DAILY
COMMUNITY

## 2022-05-24 RX ORDER — ALBUTEROL SULFATE 2.5 MG/3ML
2.5 SOLUTION RESPIRATORY (INHALATION) 4 TIMES DAILY
Status: DISCONTINUED | OUTPATIENT
Start: 2022-05-24 | End: 2022-05-28 | Stop reason: HOSPADM

## 2022-05-24 RX ORDER — AZITHROMYCIN 250 MG/1
500 TABLET, FILM COATED ORAL EVERY 24 HOURS
Status: DISCONTINUED | OUTPATIENT
Start: 2022-05-24 | End: 2022-05-24

## 2022-05-24 RX ORDER — DOCUSATE SODIUM 100 MG/1
100 CAPSULE, LIQUID FILLED ORAL EVERY 12 HOURS
COMMUNITY

## 2022-05-24 RX ORDER — ESCITALOPRAM OXALATE 10 MG/1
15 TABLET ORAL DAILY
Status: DISCONTINUED | OUTPATIENT
Start: 2022-05-25 | End: 2022-05-28 | Stop reason: HOSPADM

## 2022-05-24 RX ORDER — INSULIN GLARGINE 100 [IU]/ML
35 INJECTION, SOLUTION SUBCUTANEOUS DAILY
COMMUNITY

## 2022-05-24 RX ORDER — CLONAZEPAM 0.5 MG/1
0.5 TABLET ORAL 2 TIMES DAILY
COMMUNITY

## 2022-05-24 RX ORDER — CARBOXYMETHYLCELLULOSE SODIUM 5 MG/ML
1 SOLUTION/ DROPS OPHTHALMIC 2 TIMES DAILY
COMMUNITY

## 2022-05-24 RX ORDER — DICYCLOMINE HCL 20 MG
20 TABLET ORAL 3 TIMES DAILY
COMMUNITY

## 2022-05-24 RX ORDER — RISPERIDONE 1 MG/1
1 TABLET, FILM COATED ORAL EVERY 12 HOURS
COMMUNITY

## 2022-05-24 RX ORDER — POLYETHYLENE GLYCOL 3350 17 G/17G
17 POWDER, FOR SOLUTION ORAL 2 TIMES DAILY
COMMUNITY

## 2022-05-24 RX ORDER — METOPROLOL TARTRATE 50 MG/1
50 TABLET, FILM COATED ORAL 2 TIMES DAILY
COMMUNITY

## 2022-05-24 RX ORDER — OXYCODONE HYDROCHLORIDE 5 MG/1
5 TABLET ORAL EVERY 8 HOURS PRN
Status: ON HOLD | COMMUNITY
End: 2022-05-28 | Stop reason: HOSPADM

## 2022-05-24 RX ORDER — QUETIAPINE FUMARATE 300 MG/1
300 TABLET, FILM COATED ORAL DAILY
COMMUNITY

## 2022-05-24 RX ORDER — LISINOPRIL 10 MG/1
10 TABLET ORAL DAILY
COMMUNITY

## 2022-05-24 RX ORDER — OXYBUTYNIN CHLORIDE 5 MG/1
5 TABLET ORAL 3 TIMES DAILY
COMMUNITY

## 2022-05-24 RX ORDER — ALBUTEROL SULFATE 90 UG/1
2 AEROSOL, METERED RESPIRATORY (INHALATION) EVERY 6 HOURS PRN
COMMUNITY

## 2022-05-24 RX ORDER — FAMOTIDINE 40 MG/1
40 TABLET, FILM COATED ORAL EVERY 12 HOURS
COMMUNITY

## 2022-05-24 RX ORDER — INSULIN GLARGINE 100 [IU]/ML
15 INJECTION, SOLUTION SUBCUTANEOUS NIGHTLY
Status: DISCONTINUED | OUTPATIENT
Start: 2022-05-24 | End: 2022-05-28 | Stop reason: HOSPADM

## 2022-05-24 RX ORDER — HYDRALAZINE HYDROCHLORIDE 20 MG/ML
10 INJECTION INTRAMUSCULAR; INTRAVENOUS EVERY 6 HOURS PRN
Status: DISCONTINUED | OUTPATIENT
Start: 2022-05-24 | End: 2022-05-28 | Stop reason: HOSPADM

## 2022-05-24 RX ORDER — CETIRIZINE HYDROCHLORIDE 10 MG/1
10 TABLET ORAL DAILY
COMMUNITY

## 2022-05-24 RX ORDER — TRAZODONE HYDROCHLORIDE 50 MG/1
50 TABLET ORAL
Status: ON HOLD | COMMUNITY
End: 2022-05-28 | Stop reason: HOSPADM

## 2022-05-24 RX ORDER — FLUTICASONE PROPIONATE 50 MCG
2 SPRAY, SUSPENSION (ML) NASAL DAILY
Status: DISCONTINUED | OUTPATIENT
Start: 2022-05-24 | End: 2022-05-28 | Stop reason: HOSPADM

## 2022-05-24 RX ORDER — GABAPENTIN 300 MG/1
600 CAPSULE ORAL 3 TIMES DAILY
COMMUNITY

## 2022-05-24 RX ORDER — SIMVASTATIN 40 MG
40 TABLET ORAL DAILY
COMMUNITY

## 2022-05-24 RX ORDER — BUSPIRONE HYDROCHLORIDE 15 MG/1
15 TABLET ORAL 3 TIMES DAILY
COMMUNITY

## 2022-05-24 RX ORDER — GUAIFENESIN 100 MG/5ML
200 SYRUP ORAL 4 TIMES DAILY PRN
COMMUNITY

## 2022-05-24 RX ORDER — HYDROXYZINE 50 MG/1
50 TABLET, FILM COATED ORAL EVERY 4 HOURS PRN
COMMUNITY

## 2022-05-24 RX ORDER — DOXYCYCLINE HYCLATE 50 MG/1
324 CAPSULE, GELATIN COATED ORAL DAILY
COMMUNITY

## 2022-05-24 RX ORDER — EZETIMIBE 10 MG/1
10 TABLET ORAL DAILY
COMMUNITY

## 2022-05-24 RX ADMIN — FLUTICASONE PROPIONATE 2 SPRAY: 50 SPRAY, METERED NASAL at 15:00

## 2022-05-24 RX ADMIN — HYDRALAZINE HYDROCHLORIDE 10 MG: 20 INJECTION INTRAMUSCULAR; INTRAVENOUS at 17:17

## 2022-05-24 RX ADMIN — AZITHROMYCIN MONOHYDRATE 500 MG: 500 INJECTION, POWDER, LYOPHILIZED, FOR SOLUTION INTRAVENOUS at 01:30

## 2022-05-24 RX ADMIN — LISINOPRIL 10 MG: 5 TABLET ORAL at 15:01

## 2022-05-24 RX ADMIN — INSULIN GLARGINE 15 UNITS: 100 INJECTION, SOLUTION SUBCUTANEOUS at 21:07

## 2022-05-24 RX ADMIN — INSULIN LISPRO 8 UNITS: 100 INJECTION, SOLUTION INTRAVENOUS; SUBCUTANEOUS at 21:07

## 2022-05-24 RX ADMIN — METOPROLOL TARTRATE 50 MG: 50 TABLET ORAL at 21:26

## 2022-05-24 RX ADMIN — CLONAZEPAM 0.5 MG: 0.5 TABLET ORAL at 21:33

## 2022-05-24 RX ADMIN — ALBUTEROL SULFATE 2.5 MG: 2.5 SOLUTION RESPIRATORY (INHALATION) at 20:41

## 2022-05-24 RX ADMIN — LEVOFLOXACIN 750 MG: 500 TABLET, FILM COATED ORAL at 21:26

## 2022-05-24 RX ADMIN — INSULIN LISPRO 8 UNITS: 100 INJECTION, SOLUTION INTRAVENOUS; SUBCUTANEOUS at 08:36

## 2022-05-24 RX ADMIN — TRAZODONE HYDROCHLORIDE 50 MG: 50 TABLET ORAL at 21:33

## 2022-05-24 RX ADMIN — HEPARIN SODIUM 5000 UNITS: 5000 INJECTION INTRAVENOUS; SUBCUTANEOUS at 06:30

## 2022-05-24 RX ADMIN — ACETAMINOPHEN 650 MG: 325 TABLET ORAL at 21:33

## 2022-05-24 RX ADMIN — INSULIN LISPRO 4 UNITS: 100 INJECTION, SOLUTION INTRAVENOUS; SUBCUTANEOUS at 12:23

## 2022-05-24 RX ADMIN — INSULIN LISPRO 8 UNITS: 100 INJECTION, SOLUTION INTRAVENOUS; SUBCUTANEOUS at 17:15

## 2022-05-24 RX ADMIN — ALBUTEROL SULFATE 2.5 MG: 2.5 SOLUTION RESPIRATORY (INHALATION) at 15:56

## 2022-05-24 RX ADMIN — METOPROLOL TARTRATE 50 MG: 50 TABLET ORAL at 09:18

## 2022-05-24 RX ADMIN — HEPARIN SODIUM 5000 UNITS: 5000 INJECTION INTRAVENOUS; SUBCUTANEOUS at 22:53

## 2022-05-24 RX ADMIN — CEFEPIME HYDROCHLORIDE 2000 MG: 2 INJECTION, POWDER, FOR SOLUTION INTRAVENOUS at 12:26

## 2022-05-24 RX ADMIN — HEPARIN SODIUM 5000 UNITS: 5000 INJECTION INTRAVENOUS; SUBCUTANEOUS at 15:01

## 2022-05-24 ASSESSMENT — PAIN SCALES - GENERAL
PAINLEVEL_OUTOF10: 0
PAINLEVEL_OUTOF10: 3
PAINLEVEL_OUTOF10: 0

## 2022-05-24 NOTE — FLOWSHEET NOTE
05/24/22 1059   Vital Signs   Temp 98.6 °F (37 °C)   Temp Source Oral   Pulse 92   Resp 18   BP (!) 171/95   BP Location Left lower arm   MAP (Calculated) 120.33     MD notified. No new orders at this time.

## 2022-05-24 NOTE — PLAN OF CARE
Problem: Discharge Planning  Goal: Discharge to home or other facility with appropriate resources  Outcome: Progressing     Problem: Safety - Adult  Goal: Free from fall injury  Outcome: Progressing     Problem: Confusion  Goal: Confusion, delirium, dementia, or psychosis is improved or at baseline  Description: INTERVENTIONS:  1. Assess for possible contributors to thought disturbance, including medications, impaired vision or hearing, underlying metabolic abnormalities, dehydration, psychiatric diagnoses, and notify attending LIP  2. [place] high risk fall precautions, as indicated  3. Provide frequent short contacts to provide reality reorientation, refocusing and direction  4. Decrease environmental stimuli, including noise as appropriate  5. Monitor and intervene to maintain adequate nutrition, hydration, elimination, sleep and activity  6. If unable to ensure safety without constant attention obtain sitter and review sitter guidelines with assigned personnel  7. Initiate Psychosocial CNS and Spiritual Care consult, as indicated  Outcome: Progressing     Problem: Pain  Goal: Verbalizes/displays adequate comfort level or baseline comfort level  Outcome: Progressing     Problem: Skin/Tissue Integrity  Goal: Absence of new skin breakdown  Description: 1. Monitor for areas of redness and/or skin breakdown  2. Assess vascular access sites hourly  3. Every 4-6 hours minimum:  Change oxygen saturation probe site  4. Every 4-6 hours:  If on nasal continuous positive airway pressure, respiratory therapy assess nares and determine need for appliance change or resting period.   Outcome: Progressing

## 2022-05-24 NOTE — PROGRESS NOTES
Hospitalist Progress Note   Admit Date:  2022  1:58 AM   Name:  Cristiana Hernandez. Age:  36 y.o. Sex:  male  :  1980   MRN:  [id number]   Room:  631/01    Presenting Complaint: Blood Infection    Reason(s) for Admission: Sepsis [facility]) [A41.9]     Hospital Course & Interval History:     Mr. Javon Carballo is a 37 yo male with PMH of spina bifida, [facility], DM2, HTN, obesity, right BKA,  4 L home O2 requirement/ chronic hypoxic respiratory failure admitted with sepsis due to UTI/ pneumonia and acute metabolic encephalopathy. UA positive- cx ECOLI. 2/2 blood cultures also with ECOLI. COVID negative. CXR shows left base infiltrate. He is on course of maxipime/ azithromycin. He had JENNIFER that has resolved with IVF. He has been seen by nephrology. US renal no acute issue. Discharge plans pending to home at long term facility. Subjective/24hr Events (22): Thinks had BM, has chronic fecal/ urine incontinance, has some nausea, no abd pain, ate ok, has runny nose, has cough/ dyspnea, typically takes nebs      Assessment & Plan:     Principal Problem:    Sepsis ([facility] [address]  Plan:   Due to pneumonia and UTI  ECOLI UTI and bacteremia:  · D4 antibiotics, will check to see if blood cultures has quinolone sensitivity  · Check EKG for discharge planning / quinolone   · CTAP stone protocol      Active Problems:    JENNIFER (acute kidney injury) ([facility] [address]  Plan:   · Trend labs-resolved   · Stopped IVF      DM2:  · Increase  lantus to 15 units bedtime and continued sliding scale        HTN:  · Continued metoprolol  · As needed IV hydralazine  · Clarifying home meds , RN to get MAR from facility       Anemia/n thrombocytopenia:  · followup HGB/ platelets      Chronic hypoxic respiratory failure:  · On home O2            Discharge Planning:      Pending to [facility] , 22     Diet:  ADULT DIET; Regular; 3 carb choices (45 gm/meal);  Low Sodium (2 gm)  DVT PPx: heparin  Code status: No Order    Hospital Problems           Last Modified POA    * (Principal) Sepsis due to Escherichia coli (E. coli) ([facility] [address] 5/24/2022 Yes    Sepsis ([address] 5/24/2022 Yes    UTI (urinary tract infection) 5/23/2022 Yes    Pneumonia 5/23/2022 Yes    Chronic respiratory failure with hypoxia ([address] 5/23/2022 Yes    Type 2 diabetes mellitus (HCC) (Chronic) 5/23/2022 Yes    JENNIFER (acute kidney injury) ([address] 5/21/2022 Yes    Hx of right BKA ([address] 5/23/2022 Yes    Thrombocytopenia ([address] 5/23/2022 Yes    Spina bifida ([address] 5/23/2022 Yes    HTN (hypertension) 5/23/2022 Yes    Severe obesity (BMI 35.0-35.9 with comorbidity) ([facility] [address] 5/23/2022 Yes    Acute metabolic encephalopathy 6/48/6122 Yes            Objective:     Patient Vitals for the past 24 hrs:   Temp Pulse Resp BP SpO2   05/24/22 1059 98.6 °F (37 °C) 92 18 (!) 171/95 96 %   05/24/22 0733 97.7 °F (36.5 °C) 101 18 (!) 154/109 94 %   05/24/22 0407 98.9 °F (37.2 °C) 94 19 (!) 153/86 94 %   05/23/22 2329 98.9 °F (37.2 °C) 92 18 (!) 161/92 93 %   05/23/22 1915 99.1 °F (37.3 °C) 97 19 (!) 151/79 94 %   05/23/22 1539 98.8 °F (37.1 °C) 103 18 (!) 138/99 98 %       Estimated body mass index is 44.35 kg/m² as calculated from the following:    Height as of 5/20/22: 5' 9\" (1.753 m). Weight as of this encounter: 300 lb 4.8 oz (136.2 kg). Intake/Output Summary (Last 24 hours) at 5/24/2022 1421  Last data filed at 5/24/2022 1411  Gross per 24 hour   Intake 240 ml   Output 1100 ml   Net -860 ml         Physical Exam:     Blood pressure (!) 171/95, pulse 92, temperature 98.6 °F (37 °C), temperature source Oral, resp. rate 18, weight (!) 300 lb 4.8 oz (136.2 kg), SpO2 96 %. General:    Well nourished. No overt distress, obese, alert and pleasant   CV:   RRR. No m/r/g. No jugular venous distension. No edema   Lungs:   CTAB. No wheezing, rhonchi, or rales. Respirations even, unlabored anterior   Abdomen: Bowel sounds present. Soft, nontender, nondistended. obese  Extremities: Right BKA  Skin:     No rashes and normal coloration. Warm and dry. Neuro:  grossly intact. Psych:  Normal mood and affect. I have reviewed ordered lab tests and independently visualized imaging below:    Recent Labs:  Recent Results (from the past 48 hour(s))   POCT Glucose    Collection Time: 05/22/22  4:44 PM   Result Value Ref Range    POC Glucose 319 (H) 65 - 100 mg/dL    Performed by: Júnior    Culture, Urine    Collection Time: 05/22/22  6:30 PM    Specimen: URINE-CLEAN CATCH   Result Value Ref Range    Special Requests NO SPECIAL REQUESTS      Culture        No growth after short period of incubation. Further results to follow after overnight incubation.    POCT Glucose    Collection Time: 05/22/22  8:58 PM   Result Value Ref Range    POC Glucose 315 (H) 65 - 100 mg/dL    Performed by: Lina Blanchard    POCT Glucose    Collection Time: 05/23/22  7:22 AM   Result Value Ref Range    POC Glucose 223 (H) 65 - 100 mg/dL    Performed by: Marcel    POCT Glucose    Collection Time: 05/23/22 10:39 AM   Result Value Ref Range    POC Glucose 228 (H) 65 - 100 mg/dL    Performed by: Marcel    Procalcitonin    Collection Time: 05/23/22 11:00 AM   Result Value Ref Range    Procalcitonin 22.43 (H) 0.00 - 0.49 ng/mL   Hemoglobin A1C    Collection Time: 05/23/22 11:00 AM   Result Value Ref Range    Hemoglobin A1C 10.7 (H) 4.20 - 6.30 %    eAG 260 mg/dL   POCT Glucose    Collection Time: 05/23/22  3:38 PM   Result Value Ref Range    POC Glucose 303 (H) 65 - 100 mg/dL    Performed by: Marcel    POCT Glucose    Collection Time: 05/23/22  8:40 PM   Result Value Ref Range    POC Glucose 226 (H) 65 - 100 mg/dL    Performed by: Chrissie 43 Metabolic Panel    Collection Time: 05/24/22  4:28 AM   Result Value Ref Range    Sodium 134 (L) 138 - 145 mmol/L    Potassium 3.7 3.5 - 5.1 mmol/L    Chloride 95 (L) 98 - 107 mmol/L    CO2 26 21 - 32 mmol/L    Anion the kidneys was performed. Comparison: None FINDINGS: The examination is significantly limited due to patient body habitus. The right kidney particularly was poorly visualized with a measurement of approximately 10.3 mL in longitudinal dimension. No large mass or gross hydronephrosis is present on the right. The left kidney was better visualized measuring approximately 12.8 cm demonstrating increased renal echogenicity but without hydronephrosis. Urinary bladder was only mildly fluid-filled but grossly anechoic. The liver is incidentally noted to be diffusely echogenic. 1. Significantly limited examination due to patient body habitus. The right kidney is poorly visualized. 2. Increased renal echogenicity of the left kidney. Differential diagnosis includes ATN and acute glomerulonephritis. 3. Hepatic steatosis. Current Meds:  Current Facility-Administered Medications   Medication Dose Route Frequency    cefepime (MAXIPIME) 2000 mg IVPB minibag in NS  2,000 mg IntraVENous Q8H    insulin glargine (LANTUS) injection vial 15 Units  15 Units SubCUTAneous Nightly    hydrALAZINE (APRESOLINE) injection 10 mg  10 mg IntraVENous Q6H PRN    azithromycin (ZITHROMAX) tablet 500 mg  500 mg Oral Q24H    metoprolol tartrate (LOPRESSOR) tablet 50 mg  50 mg Oral BID    glucose chewable tablet 16 g  4 tablet Oral PRN    dextrose bolus 10% 125 mL  125 mL IntraVENous PRN    Or    dextrose bolus 10% 250 mL  250 mL IntraVENous PRN    insulin lispro (HUMALOG) injection vial 0-16 Units  0-16 Units SubCUTAneous 4x Daily AC & HS    heparin (porcine) injection 5,000 Units  5,000 Units SubCUTAneous q8h    acetaminophen (TYLENOL) tablet 650 mg  650 mg Oral Q6H PRN       Signed:  Magalys Childress MD    Part of this note may have been written by using a voice dictation software. The note has been proof read but may still contain some grammatical/other typographical errors.

## 2022-05-25 LAB
ANION GAP SERPL CALC-SCNC: 9 MMOL/L (ref 7–16)
BASOPHILS # BLD: 0.1 K/UL (ref 0–0.2)
BASOPHILS NFR BLD: 1 % (ref 0–2)
BUN SERPL-MCNC: 22 MG/DL (ref 6–23)
CALCIUM SERPL-MCNC: 9.5 MG/DL (ref 8.3–10.4)
CHLORIDE SERPL-SCNC: 96 MMOL/L (ref 98–107)
CO2 SERPL-SCNC: 29 MMOL/L (ref 21–32)
CREAT SERPL-MCNC: 0.9 MG/DL (ref 0.8–1.5)
DIFFERENTIAL METHOD BLD: ABNORMAL
EOSINOPHIL # BLD: 0.2 K/UL (ref 0–0.8)
EOSINOPHIL NFR BLD: 2 % (ref 0.5–7.8)
ERYTHROCYTE [DISTWIDTH] IN BLOOD BY AUTOMATED COUNT: 14.6 % (ref 11.9–14.6)
FERRITIN SERPL-MCNC: 261 NG/ML (ref 8–388)
FOLATE SERPL-MCNC: 18.8 NG/ML (ref 3.1–17.5)
GLUCOSE BLD STRIP.AUTO-MCNC: 263 MG/DL (ref 65–100)
GLUCOSE BLD STRIP.AUTO-MCNC: 266 MG/DL (ref 65–100)
GLUCOSE BLD STRIP.AUTO-MCNC: 292 MG/DL (ref 65–100)
GLUCOSE BLD STRIP.AUTO-MCNC: 471 MG/DL (ref 65–100)
GLUCOSE SERPL-MCNC: 258 MG/DL (ref 65–100)
HCT VFR BLD AUTO: 35.1 % (ref 41.1–50.3)
HGB BLD-MCNC: 11.1 G/DL (ref 13.6–17.2)
IMM GRANULOCYTES # BLD AUTO: 0.6 K/UL (ref 0–0.5)
IMM GRANULOCYTES NFR BLD AUTO: 6 % (ref 0–5)
IRON SATN MFR SERPL: 19 %
IRON SERPL-MCNC: 51 UG/DL (ref 35–150)
LYMPHOCYTES # BLD: 1.7 K/UL (ref 0.5–4.6)
LYMPHOCYTES NFR BLD: 18 % (ref 13–44)
MCH RBC QN AUTO: 26.4 PG (ref 26.1–32.9)
MCHC RBC AUTO-ENTMCNC: 31.6 G/DL (ref 31.4–35)
MCV RBC AUTO: 83.6 FL (ref 79.6–97.8)
MONOCYTES # BLD: 0.9 K/UL (ref 0.1–1.3)
MONOCYTES NFR BLD: 10 % (ref 4–12)
NEUTS SEG # BLD: 5.9 K/UL (ref 1.7–8.2)
NEUTS SEG NFR BLD: 63 % (ref 43–78)
NRBC # BLD: 0 K/UL (ref 0–0.2)
PLATELET # BLD AUTO: 327 K/UL (ref 150–450)
PMV BLD AUTO: 10.3 FL (ref 9.4–12.3)
POTASSIUM SERPL-SCNC: 3.6 MMOL/L (ref 3.5–5.1)
RBC # BLD AUTO: 4.2 M/UL (ref 4.23–5.6)
SERVICE CMNT-IMP: ABNORMAL
SODIUM SERPL-SCNC: 134 MMOL/L (ref 138–145)
TIBC SERPL-MCNC: 268 UG/DL (ref 250–450)
VIT B12 SERPL-MCNC: 687 PG/ML (ref 193–986)
WBC # BLD AUTO: 9.4 K/UL (ref 4.3–11.1)

## 2022-05-25 PROCEDURE — 6360000002 HC RX W HCPCS: Performed by: INTERNAL MEDICINE

## 2022-05-25 PROCEDURE — 80048 BASIC METABOLIC PNL TOTAL CA: CPT

## 2022-05-25 PROCEDURE — 6370000000 HC RX 637 (ALT 250 FOR IP): Performed by: FAMILY MEDICINE

## 2022-05-25 PROCEDURE — 2700000000 HC OXYGEN THERAPY PER DAY

## 2022-05-25 PROCEDURE — 94760 N-INVAS EAR/PLS OXIMETRY 1: CPT

## 2022-05-25 PROCEDURE — 82962 GLUCOSE BLOOD TEST: CPT

## 2022-05-25 PROCEDURE — 6370000000 HC RX 637 (ALT 250 FOR IP): Performed by: INTERNAL MEDICINE

## 2022-05-25 PROCEDURE — 83540 ASSAY OF IRON: CPT

## 2022-05-25 PROCEDURE — 2580000003 HC RX 258: Performed by: INTERNAL MEDICINE

## 2022-05-25 PROCEDURE — 82607 VITAMIN B-12: CPT

## 2022-05-25 PROCEDURE — 6360000002 HC RX W HCPCS: Performed by: NURSE PRACTITIONER

## 2022-05-25 PROCEDURE — 94640 AIRWAY INHALATION TREATMENT: CPT

## 2022-05-25 PROCEDURE — 36415 COLL VENOUS BLD VENIPUNCTURE: CPT

## 2022-05-25 PROCEDURE — 1100000003 HC PRIVATE W/ TELEMETRY

## 2022-05-25 PROCEDURE — 94664 DEMO&/EVAL PT USE INHALER: CPT

## 2022-05-25 PROCEDURE — 85025 COMPLETE CBC W/AUTO DIFF WBC: CPT

## 2022-05-25 PROCEDURE — 82728 ASSAY OF FERRITIN: CPT

## 2022-05-25 PROCEDURE — 82746 ASSAY OF FOLIC ACID SERUM: CPT

## 2022-05-25 PROCEDURE — 6370000000 HC RX 637 (ALT 250 FOR IP): Performed by: NURSE PRACTITIONER

## 2022-05-25 RX ORDER — DOXYCYCLINE HYCLATE 100 MG/1
100 CAPSULE ORAL EVERY 12 HOURS
Status: DISCONTINUED | OUTPATIENT
Start: 2022-05-25 | End: 2022-05-28 | Stop reason: HOSPADM

## 2022-05-25 RX ADMIN — LISINOPRIL 10 MG: 5 TABLET ORAL at 08:42

## 2022-05-25 RX ADMIN — INSULIN LISPRO 8 UNITS: 100 INJECTION, SOLUTION INTRAVENOUS; SUBCUTANEOUS at 21:26

## 2022-05-25 RX ADMIN — INSULIN LISPRO 8 UNITS: 100 INJECTION, SOLUTION INTRAVENOUS; SUBCUTANEOUS at 17:20

## 2022-05-25 RX ADMIN — METOPROLOL TARTRATE 50 MG: 50 TABLET ORAL at 21:56

## 2022-05-25 RX ADMIN — CLONAZEPAM 0.5 MG: 0.5 TABLET ORAL at 21:56

## 2022-05-25 RX ADMIN — INSULIN LISPRO 8 UNITS: 100 INJECTION, SOLUTION INTRAVENOUS; SUBCUTANEOUS at 08:00

## 2022-05-25 RX ADMIN — ALBUTEROL SULFATE 2.5 MG: 2.5 SOLUTION RESPIRATORY (INHALATION) at 13:07

## 2022-05-25 RX ADMIN — DOXYCYCLINE HYCLATE 100 MG: 100 CAPSULE ORAL at 14:07

## 2022-05-25 RX ADMIN — HEPARIN SODIUM 5000 UNITS: 5000 INJECTION INTRAVENOUS; SUBCUTANEOUS at 23:07

## 2022-05-25 RX ADMIN — TRAZODONE HYDROCHLORIDE 50 MG: 50 TABLET ORAL at 21:56

## 2022-05-25 RX ADMIN — INSULIN LISPRO 16 UNITS: 100 INJECTION, SOLUTION INTRAVENOUS; SUBCUTANEOUS at 11:29

## 2022-05-25 RX ADMIN — HEPARIN SODIUM 5000 UNITS: 5000 INJECTION INTRAVENOUS; SUBCUTANEOUS at 06:30

## 2022-05-25 RX ADMIN — ESCITALOPRAM OXALATE 15 MG: 10 TABLET ORAL at 08:42

## 2022-05-25 RX ADMIN — METOPROLOL TARTRATE 50 MG: 50 TABLET ORAL at 08:42

## 2022-05-25 RX ADMIN — ALBUTEROL SULFATE 2.5 MG: 2.5 SOLUTION RESPIRATORY (INHALATION) at 09:20

## 2022-05-25 RX ADMIN — ACETAMINOPHEN 650 MG: 325 TABLET ORAL at 20:38

## 2022-05-25 RX ADMIN — CEFTRIAXONE 2000 MG: 2 INJECTION, POWDER, FOR SOLUTION INTRAMUSCULAR; INTRAVENOUS at 14:06

## 2022-05-25 RX ADMIN — HEPARIN SODIUM 5000 UNITS: 5000 INJECTION INTRAVENOUS; SUBCUTANEOUS at 15:14

## 2022-05-25 RX ADMIN — INSULIN GLARGINE 15 UNITS: 100 INJECTION, SOLUTION SUBCUTANEOUS at 21:28

## 2022-05-25 RX ADMIN — ALBUTEROL SULFATE 2.5 MG: 2.5 SOLUTION RESPIRATORY (INHALATION) at 22:20

## 2022-05-25 ASSESSMENT — PAIN DESCRIPTION - LOCATION: LOCATION: ABDOMEN

## 2022-05-25 ASSESSMENT — PAIN SCALES - GENERAL
PAINLEVEL_OUTOF10: 3
PAINLEVEL_OUTOF10: 0

## 2022-05-25 NOTE — PLAN OF CARE
Problem: Discharge Planning  Goal: Discharge to home or other facility with appropriate resources  Outcome: Progressing     Problem: Safety - Adult  Goal: Free from fall injury  Outcome: Progressing     Problem: Confusion  Goal: Confusion, delirium, dementia, or psychosis is improved or at baseline  Description: INTERVENTIONS:  1. Assess for possible contributors to thought disturbance, including medications, impaired vision or hearing, underlying metabolic abnormalities, dehydration, psychiatric diagnoses, and notify attending LIP  2. [place] high risk fall precautions, as indicated  3. Provide frequent short contacts to provide reality reorientation, refocusing and direction  4. Decrease environmental stimuli, including noise as appropriate  5. Monitor and intervene to maintain adequate nutrition, hydration, elimination, sleep and activity  6. If unable to ensure safety without constant attention obtain sitter and review sitter guidelines with assigned personnel  7. Initiate Psychosocial CNS and Spiritual Care consult, as indicated  Outcome: Progressing     Problem: Pain  Goal: Verbalizes/displays adequate comfort level or baseline comfort level  Outcome: Progressing     Problem: Skin/Tissue Integrity  Goal: Absence of new skin breakdown  Description: 1. Monitor for areas of redness and/or skin breakdown  2. Assess vascular access sites hourly  3. Every 4-6 hours minimum:  Change oxygen saturation probe site  4. Every 4-6 hours:  If on nasal continuous positive airway pressure, respiratory therapy assess nares and determine need for appliance change or resting period.   Outcome: Progressing     Problem: ABCDS Injury Assessment  Goal: Absence of physical injury  Outcome: Progressing

## 2022-05-25 NOTE — PROGRESS NOTES
Hospitalist Progress Note   Admit Date:  2022  1:58 AM   Name:  Jerome Fabian. Age:  36 y.o. Sex:  male  :  1980   MRN:  [id number]   Room:  [facility]/    Presenting Complaint: change in mental status and fever   Reason(s) for Admission: Sepsis [facility]) [A41.9]     Hospital Course & Interval History:     Patient with past medical history of    Spina bifida   S/P right BKA surgery   Obesity with current BMI of 34  Hypertension   GERD   Hepatic steatosis     Patient was brought to ER due to change in mental status and concern for sepsis with fever up to 103.3 F. Patient is found to have JENNIFER, pyuria, with pulmonary infiltrates. Patient is being treated for UTI and bacterial pneumonia. Subjective/24hr Events (22):    22   Patient is resting and lying flat in bed. Sleepy and falling asleep easily when called. Not giving information spontaneously. No chest pain. No shortness of breath. No report of nausea, vomiting. No blood per rectum. No blood per urine. Assessment & Plan:     Principal Problem:    Sepsis due to Escherichia coli (E. coli) (HCC)    UTI (urinary tract infection)    Pneumonia    Acute metabolic encephalopathy  Both urine and blood cultures grew E.coli. Patient is on Cefepime and Levofloxacin now. Will change antibiotic to Ceftriaxone as per sensitivity report. Give Doxycycline to better cover pneumonia as well although it is likely this is the result of E.coli bacteremia. Active Problems:        Chronic respiratory failure with hypoxia (HCC)  From obesity   Likely having some degrees of OLIVIA as well. Type 2 diabetes mellitus (HCC)  Monitor blood sugar. Cover with insulin sliding scale accordingly. Blood sugar is in 200s ranges. JENNIFER (acute kidney injury) ([facility] [address]  Improved since admission. Monitor renal function and intake and output. Avoid nephrotoxic agents.         Hx of right BKA (HCC)  Noted       Thrombocytopenia ([facility] [address]  Platelet count has improved since admission and treatment of sepsis and UTI and pneumonia       Spina bifida ([address]  Noted       HTN (hypertension)  Blood pressure is elevated. Patient is on Lisinopril and Metoprolol. Severe obesity (BMI 35.0-35.9 with comorbidity) ([facility] [address]  Patient will be counseled for weight loss and maintenance of healthy weight when recovering from this acute illness. I have discussed the plan of care with patient and care team.        Discharge Planning:      to be determined     Diet:  ADULT DIET; Regular; 3 carb choices (45 gm/meal);  Low Sodium (2 gm)  DVT PPx: heparin SC   Code status: No Order    Hospital Problems           Last Modified POA    * (Principal) Sepsis due to Escherichia coli (E. coli) ([facility] [address] 5/24/2022 Yes    Sepsis ([facility] [address] 5/24/2022 Yes    UTI (urinary tract infection) 5/23/2022 Yes    Pneumonia 5/23/2022 Yes    Chronic respiratory failure with hypoxia ([facility] [address] 5/23/2022 Yes    Type 2 diabetes mellitus ([facility] [address] (Chronic) 5/23/2022 Yes    JENNIFER (acute kidney injury) ([facility] [address] 5/21/2022 Yes    Hx of right BKA ([facility] [address] 5/23/2022 Yes    Thrombocytopenia ([facility] [address] 5/23/2022 Yes    Spina bifida ([facility] [address] 5/23/2022 Yes    HTN (hypertension) 5/23/2022 Yes    Severe obesity (BMI 35.0-35.9 with comorbidity) ([facility] [address] 5/23/2022 Yes    Acute metabolic encephalopathy 7/91/5454 Yes            Objective:     Patient Vitals for the past 24 hrs:   Temp Pulse Resp BP SpO2   05/25/22 1058 98.2 °F (36.8 °C) 92 18 (!) 159/91 98 %   05/25/22 0922 -- -- 18 -- 96 %   05/25/22 0724 98.8 °F (37.1 °C) (!) 107 18 (!) 159/98 98 %   05/25/22 0412 98.6 °F (37 °C) 94 19 (!) 156/89 99 %   05/24/22 2315 98.4 °F (36.9 °C) 87 18 (!) 142/73 97 %   05/24/22 2041 -- 90 16 -- 96 %   05/24/22 1944 98.2 °F (36.8 °C) 93 19 (!) 166/86 94 %   05/24/22 1715 -- 90 -- (!) 166/95 --   05/24/22 1558 -- -- 19 -- 98 %   05/24/22 1445 98.6 °F (37 °C) 80 18 (!) 177/103 97 %       Estimated body mass index is 34.32 kg/m² as calculated from the following:    Height as of 5/20/22: 5' 9\" (1.753 m). Weight as of this encounter: 232 lb 6.4 oz (105.4 kg). Intake/Output Summary (Last 24 hours) at 5/25/2022 1228  Last data filed at 5/24/2022 1839  Gross per 24 hour   Intake 236 ml   Output 600 ml   Net -364 ml         Physical Exam:     Blood pressure (!) 159/91, pulse 92, temperature 98.2 °F (36.8 °C), temperature source Oral, resp. rate 18, weight 232 lb 6.4 oz (105.4 kg), SpO2 98 %. General:    Well nourished. No overt distress. Patient is lying flat in bed and resting. Patient appears sleepy and did not answer questions. Mildly pale. No icterus. BMI of 34  Head:  Normocephalic, atraumatic  Eyes:  Sclerae appear normal.  Pupils equally round. ENT:  Nares appear normal, no drainage. Moist oral mucosa  Neck:  No restricted ROM. Trachea midline   CV:   RRR. No m/r/g. No jugular venous distension. Lungs:   Diminished breath sound bilaterally at bases. No wheezing, rhonchi, or rales. Respirations even, unlabored  Abdomen: Bowel sounds present. Soft, nontender, nondistended. Extremities: No cyanosis or clubbing. No edema  Skin:     No rashes and normal coloration. Warm and dry. Neuro:  No observed localized weakness. Lethargic.      I have reviewed ordered lab tests and independently visualized imaging below:    Recent Labs:  Recent Results (from the past 48 hour(s))   POCT Glucose    Collection Time: 05/23/22  3:38 PM   Result Value Ref Range    POC Glucose 303 (H) 65 - 100 mg/dL    Performed by: Marcel    POCT Glucose    Collection Time: 05/23/22  8:40 PM   Result Value Ref Range    POC Glucose 226 (H) 65 - 100 mg/dL    Performed by: Chrissie 43 Metabolic Panel    Collection Time: 05/24/22  4:28 AM   Result Value Ref Range    Sodium 134 (L) 138 - 145 mmol/L    Potassium 3.7 3.5 - 5.1 mmol/L    Chloride 95 (L) 98 - 107 mmol/L    CO2 26 21 - 32 mmol/L    Anion Gap 13 7 - 16 mmol/L    Glucose 253 (H) 65 - 100 mg/dL    BUN 24 (H) 6 - 23 MG/DL    CREATININE 0.90 0.8 - 1.5 MG/DL    GFR African American >60 >60 ml/min/1.73m2    GFR Non- >60 >60 ml/min/1.73m2    Calcium 9.2 8.3 - 10.4 MG/DL   CBC with Auto Differential    Collection Time: 05/24/22  4:28 AM   Result Value Ref Range    WBC 7.8 4.3 - 11.1 K/uL    RBC 3.90 (L) 4.23 - 5.6 M/uL    Hemoglobin 10.2 (L) 13.6 - 17.2 g/dL    Hematocrit 32.4 (L) 41.1 - 50.3 %    MCV 83.1 79.6 - 97.8 FL    MCH 26.2 26.1 - 32.9 PG    MCHC 31.5 31.4 - 35.0 g/dL    RDW 14.7 (H) 11.9 - 14.6 %    Platelets 330 937 - 843 K/uL    MPV 10.0 9.4 - 12.3 FL    nRBC 0.00 0.0 - 0.2 K/uL    Differential Type AUTOMATED      Seg Neutrophils 63 43 - 78 %    Lymphocytes 21 13 - 44 %    Monocytes 10 4.0 - 12.0 %    Eosinophils % 2 0.5 - 7.8 %    Basophils 1 0.0 - 2.0 %    Immature Granulocytes 3 0.0 - 5.0 %    Segs Absolute 4.9 1.7 - 8.2 K/UL    Absolute Lymph # 1.6 0.5 - 4.6 K/UL    Absolute Mono # 0.8 0.1 - 1.3 K/UL    Absolute Eos # 0.2 0.0 - 0.8 K/UL    Basophils Absolute 0.1 0.0 - 0.2 K/UL    Absolute Immature Granulocyte 0.2 0.0 - 0.5 K/UL   POCT Glucose    Collection Time: 05/24/22  7:34 AM   Result Value Ref Range    POC Glucose 252 (H) 65 - 100 mg/dL    Performed by: Donna    POCT Glucose    Collection Time: 05/24/22 11:26 AM   Result Value Ref Range    POC Glucose 230 (H) 65 - 100 mg/dL    Performed by: Donna    EKG 12 Lead    Collection Time: 05/24/22 12:57 PM   Result Value Ref Range    Ventricular Rate 91 BPM    Atrial Rate 91 BPM    P-R Interval 178 ms    QRS Duration 92 ms    Q-T Interval 378 ms    QTc Calculation (Bazett) 464 ms    P Axis 53 degrees    R Axis 33 degrees    T Axis 28 degrees    Diagnosis Normal sinus rhythm    POCT Glucose    Collection Time: 05/24/22  4:33 PM   Result Value Ref Range    POC Glucose 263 (H) 65 - 100 mg/dL    Performed by: Donna    POCT Glucose    Collection Time: 05/24/22  8:47 PM   Result Value Ref Range    POC Glucose 285 (H) 65 - 100 mg/dL    Performed by: [address]    Basic Metabolic Panel    Collection Time: 05/25/22  5:03 AM   Result Value Ref Range    Sodium 134 (L) 138 - 145 mmol/L    Potassium 3.6 3.5 - 5.1 mmol/L    Chloride 96 (L) 98 - 107 mmol/L    CO2 29 21 - 32 mmol/L    Anion Gap 9 7 - 16 mmol/L    Glucose 258 (H) 65 - 100 mg/dL    BUN 22 6 - 23 MG/DL    CREATININE 0.90 0.8 - 1.5 MG/DL    GFR African American >60 >60 ml/min/1.73m2    GFR Non- >60 >60 ml/min/1.73m2    Calcium 9.5 8.3 - 10.4 MG/DL   CBC with Auto Differential    Collection Time: 05/25/22  5:03 AM   Result Value Ref Range    WBC 9.4 4.3 - 11.1 K/uL    RBC 4.20 (L) 4.23 - 5.6 M/uL    Hemoglobin 11.1 (L) 13.6 - 17.2 g/dL    Hematocrit 35.1 (L) 41.1 - 50.3 %    MCV 83.6 79.6 - 97.8 FL    MCH 26.4 26.1 - 32.9 PG    MCHC 31.6 31.4 - 35.0 g/dL    RDW 14.6 11.9 - 14.6 %    Platelets 852 013 - 802 K/uL    MPV 10.3 9.4 - 12.3 FL    nRBC 0.00 0.0 - 0.2 K/uL    Differential Type AUTOMATED      Seg Neutrophils 63 43 - 78 %    Lymphocytes 18 13 - 44 %    Monocytes 10 4.0 - 12.0 %    Eosinophils % 2 0.5 - 7.8 %    Basophils 1 0.0 - 2.0 %    Immature Granulocytes 6 (H) 0.0 - 5.0 %    Segs Absolute 5.9 1.7 - 8.2 K/UL    Absolute Lymph # 1.7 0.5 - 4.6 K/UL    Absolute Mono # 0.9 0.1 - 1.3 K/UL    Absolute Eos # 0.2 0.0 - 0.8 K/UL    Basophils Absolute 0.1 0.0 - 0.2 K/UL    Absolute Immature Granulocyte 0.6 (H) 0.0 - 0.5 K/UL   Ferritin    Collection Time: 05/25/22  5:03 AM   Result Value Ref Range    Ferritin 261 8 - 388 NG/ML   Transferrin Saturation    Collection Time: 05/25/22  5:03 AM   Result Value Ref Range    Iron 51 35 - 150 ug/dL    TIBC 268 250 - 450 ug/dL    TRANSFERRIN SATURATION 19 (L) >20 %   Vitamin B12    Collection Time: 05/25/22  5:03 AM   Result Value Ref Range    Vitamin B-12 687 193 - 986 pg/mL   Folate    Collection Time: 05/25/22  5:03 AM   Result Value Ref Range    Folate 18.8 (H) 3.1 - 17.5 ng/mL POCT Glucose    Collection Time: 05/25/22  7:22 AM   Result Value Ref Range    POC Glucose 292 (H) 65 - 100 mg/dL    Performed by: Marcel    POCT Glucose    Collection Time: 05/25/22 10:57 AM   Result Value Ref Range    POC Glucose 471 (HH) 65 - 100 mg/dL    Performed by: Phillip Woodard          Other Studies:  CT ABDOMEN PELVIS RENAL STONE Additional Contrast? None    Result Date: 5/24/2022  EXAM: CT of the abdomen and pelvis without contrast. Indication: Bacteremia Comparison: Renal ultrasound, 5/21/2022 Multiple axial images were obtained through the abdomen and pelvis without IV contrast.  Radiation dose reduction techniques were used for this study: All CT scans performed at this facility use one or all of the following: Automated exposure control, adjustment of the mA and/or kVp according to patient's size, iterative reconstruction. FINDINGS: LOWER THORAX: Lung bases are clear. LIVER: Hepatic steatosis and hepatomegaly. BILIARY: The gallbladder is normal. No biliary duct dilation. SPLEEN: No splenomegaly. PANCREAS: No pancreatic mass. No pancreatic duct dilation. ADRENALS: No adrenal nodule or adrenal hypertrophy. URINARY SYSTEM: The kidneys are symmetric in size and cortical thickness. There is bilateral symmetric perinephric stranding. No contour deforming abnormality on the kidneys. No hydronephrosis. 5 mm calculus layering in the urinary bladder. Otherwise, no urinary tract calculi. No hydroureter. There is mild periureteral fat stranding along the right mid ureter. The urinary bladder demonstrates diffuse wall thickening and underdistention. BOWEL: Stomach, small bowel, and large bowel are normal in course and caliber. No evidence of obstruction. The appendix is normal. VASCULAR: The abdominal aorta and iliac arterial system are nonaneurysmal. NODES: No lymphadenopathy. FLUID:  No free fluid. REPRODUCTIVE: Unremarkable. BONES/SOFT TISSUE: No fracture or aggressive osseous lesion.  No soft tissue abnormality. 1. Symmetric bilateral perinephric inflammatory stranding and inflammatory stranding surrounding the right ureter. Recommend correlation for ascending urinary tract infection and pyelonephritis. 2. Additional wall thickening of the urinary bladder could represent cystitis or under distention. 3. 5 mm nonobstructing bladder calculus. 4. Hepatomegaly with steatosis. Current Meds:  Current Facility-Administered Medications   Medication Dose Route Frequency    insulin glargine (LANTUS) injection vial 15 Units  15 Units SubCUTAneous Nightly    hydrALAZINE (APRESOLINE) injection 10 mg  10 mg IntraVENous Q6H PRN    albuterol (PROVENTIL) nebulizer solution 2.5 mg  2.5 mg Nebulization 4x daily    traZODone (DESYREL) tablet 50 mg  50 mg Oral Nightly PRN    lisinopril (PRINIVIL;ZESTRIL) tablet 10 mg  10 mg Oral Daily    fluticasone (FLONASE) 50 MCG/ACT nasal spray 2 spray  2 spray Each Nostril Daily    clonazePAM (KLONOPIN) tablet 0.5 mg  0.5 mg Oral BID PRN    escitalopram (LEXAPRO) tablet 15 mg  15 mg Oral Daily    levoFLOXacin (LEVAQUIN) tablet 750 mg  750 mg Oral Q24H    metoprolol tartrate (LOPRESSOR) tablet 50 mg  50 mg Oral BID    glucose chewable tablet 16 g  4 tablet Oral PRN    dextrose bolus 10% 125 mL  125 mL IntraVENous PRN    Or    dextrose bolus 10% 250 mL  250 mL IntraVENous PRN    insulin lispro (HUMALOG) injection vial 0-16 Units  0-16 Units SubCUTAneous 4x Daily AC & HS    heparin (porcine) injection 5,000 Units  5,000 Units SubCUTAneous q8h    acetaminophen (TYLENOL) tablet 650 mg  650 mg Oral Q6H PRN       Signed:  Naveen Catalan MD    Part of this note may have been written by using a voice dictation software. The note has been proof read but may still contain some grammatical/other typographical errors.

## 2022-05-26 LAB
ANION GAP SERPL CALC-SCNC: 12 MMOL/L (ref 7–16)
BACTERIA SPEC CULT: NORMAL
BASOPHILS # BLD: 0.1 K/UL (ref 0–0.2)
BASOPHILS NFR BLD: 1 % (ref 0–2)
BUN SERPL-MCNC: 21 MG/DL (ref 6–23)
CALCIUM SERPL-MCNC: 9.3 MG/DL (ref 8.3–10.4)
CHLORIDE SERPL-SCNC: 96 MMOL/L (ref 98–107)
CO2 SERPL-SCNC: 25 MMOL/L (ref 21–32)
CREAT SERPL-MCNC: 0.7 MG/DL (ref 0.8–1.5)
DIFFERENTIAL METHOD BLD: ABNORMAL
EOSINOPHIL # BLD: 0.1 K/UL (ref 0–0.8)
EOSINOPHIL NFR BLD: 1 % (ref 0.5–7.8)
ERYTHROCYTE [DISTWIDTH] IN BLOOD BY AUTOMATED COUNT: 14.5 % (ref 11.9–14.6)
GLUCOSE BLD STRIP.AUTO-MCNC: 227 MG/DL (ref 65–100)
GLUCOSE BLD STRIP.AUTO-MCNC: 237 MG/DL (ref 65–100)
GLUCOSE BLD STRIP.AUTO-MCNC: 246 MG/DL (ref 65–100)
GLUCOSE BLD STRIP.AUTO-MCNC: 247 MG/DL (ref 65–100)
GLUCOSE BLD STRIP.AUTO-MCNC: 278 MG/DL (ref 65–100)
GLUCOSE SERPL-MCNC: 224 MG/DL (ref 65–100)
HCT VFR BLD AUTO: 35.7 % (ref 41.1–50.3)
HGB BLD-MCNC: 11.1 G/DL (ref 13.6–17.2)
IMM GRANULOCYTES # BLD AUTO: 0.6 K/UL (ref 0–0.5)
IMM GRANULOCYTES NFR BLD AUTO: 6 % (ref 0–5)
LYMPHOCYTES # BLD: 2.1 K/UL (ref 0.5–4.6)
LYMPHOCYTES NFR BLD: 20 % (ref 13–44)
MCH RBC QN AUTO: 25.9 PG (ref 26.1–32.9)
MCHC RBC AUTO-ENTMCNC: 31.1 G/DL (ref 31.4–35)
MCV RBC AUTO: 83.4 FL (ref 79.6–97.8)
MONOCYTES # BLD: 0.8 K/UL (ref 0.1–1.3)
MONOCYTES NFR BLD: 8 % (ref 4–12)
NEUTS SEG # BLD: 6.3 K/UL (ref 1.7–8.2)
NEUTS SEG NFR BLD: 63 % (ref 43–78)
NRBC # BLD: 0 K/UL (ref 0–0.2)
PLATELET # BLD AUTO: 397 K/UL (ref 150–450)
PMV BLD AUTO: 9.9 FL (ref 9.4–12.3)
POTASSIUM SERPL-SCNC: 3.7 MMOL/L (ref 3.5–5.1)
RBC # BLD AUTO: 4.28 M/UL (ref 4.23–5.6)
SERVICE CMNT-IMP: ABNORMAL
SERVICE CMNT-IMP: NORMAL
SODIUM SERPL-SCNC: 133 MMOL/L (ref 138–145)
WBC # BLD AUTO: 10.1 K/UL (ref 4.3–11.1)

## 2022-05-26 PROCEDURE — 2580000003 HC RX 258: Performed by: INTERNAL MEDICINE

## 2022-05-26 PROCEDURE — 6360000002 HC RX W HCPCS: Performed by: NURSE PRACTITIONER

## 2022-05-26 PROCEDURE — 76937 US GUIDE VASCULAR ACCESS: CPT

## 2022-05-26 PROCEDURE — 6370000000 HC RX 637 (ALT 250 FOR IP): Performed by: NURSE PRACTITIONER

## 2022-05-26 PROCEDURE — 36415 COLL VENOUS BLD VENIPUNCTURE: CPT

## 2022-05-26 PROCEDURE — 1100000003 HC PRIVATE W/ TELEMETRY

## 2022-05-26 PROCEDURE — 94640 AIRWAY INHALATION TREATMENT: CPT

## 2022-05-26 PROCEDURE — 94760 N-INVAS EAR/PLS OXIMETRY 1: CPT

## 2022-05-26 PROCEDURE — 6360000002 HC RX W HCPCS: Performed by: INTERNAL MEDICINE

## 2022-05-26 PROCEDURE — 6370000000 HC RX 637 (ALT 250 FOR IP): Performed by: INTERNAL MEDICINE

## 2022-05-26 PROCEDURE — 6370000000 HC RX 637 (ALT 250 FOR IP): Performed by: FAMILY MEDICINE

## 2022-05-26 PROCEDURE — 2700000000 HC OXYGEN THERAPY PER DAY

## 2022-05-26 PROCEDURE — 80048 BASIC METABOLIC PNL TOTAL CA: CPT

## 2022-05-26 PROCEDURE — 85025 COMPLETE CBC W/AUTO DIFF WBC: CPT

## 2022-05-26 PROCEDURE — 82962 GLUCOSE BLOOD TEST: CPT

## 2022-05-26 RX ADMIN — CEFTRIAXONE 2000 MG: 2 INJECTION, POWDER, FOR SOLUTION INTRAMUSCULAR; INTRAVENOUS at 12:51

## 2022-05-26 RX ADMIN — INSULIN LISPRO 4 UNITS: 100 INJECTION, SOLUTION INTRAVENOUS; SUBCUTANEOUS at 11:24

## 2022-05-26 RX ADMIN — TRAZODONE HYDROCHLORIDE 50 MG: 50 TABLET ORAL at 22:36

## 2022-05-26 RX ADMIN — INSULIN GLARGINE 15 UNITS: 100 INJECTION, SOLUTION SUBCUTANEOUS at 22:29

## 2022-05-26 RX ADMIN — CLONAZEPAM 0.5 MG: 0.5 TABLET ORAL at 19:46

## 2022-05-26 RX ADMIN — DOXYCYCLINE HYCLATE 100 MG: 100 CAPSULE ORAL at 00:50

## 2022-05-26 RX ADMIN — ACETAMINOPHEN 650 MG: 325 TABLET ORAL at 19:46

## 2022-05-26 RX ADMIN — HEPARIN SODIUM 5000 UNITS: 5000 INJECTION INTRAVENOUS; SUBCUTANEOUS at 22:34

## 2022-05-26 RX ADMIN — DOXYCYCLINE HYCLATE 100 MG: 100 CAPSULE ORAL at 12:51

## 2022-05-26 RX ADMIN — ALBUTEROL SULFATE 2.5 MG: 2.5 SOLUTION RESPIRATORY (INHALATION) at 16:43

## 2022-05-26 RX ADMIN — METOPROLOL TARTRATE 50 MG: 50 TABLET ORAL at 08:49

## 2022-05-26 RX ADMIN — ALBUTEROL SULFATE 2.5 MG: 2.5 SOLUTION RESPIRATORY (INHALATION) at 21:18

## 2022-05-26 RX ADMIN — LISINOPRIL 10 MG: 5 TABLET ORAL at 08:49

## 2022-05-26 RX ADMIN — ESCITALOPRAM OXALATE 15 MG: 10 TABLET ORAL at 08:48

## 2022-05-26 RX ADMIN — INSULIN LISPRO 4 UNITS: 100 INJECTION, SOLUTION INTRAVENOUS; SUBCUTANEOUS at 08:49

## 2022-05-26 RX ADMIN — METOPROLOL TARTRATE 50 MG: 50 TABLET ORAL at 22:35

## 2022-05-26 RX ADMIN — ALBUTEROL SULFATE 2.5 MG: 2.5 SOLUTION RESPIRATORY (INHALATION) at 12:59

## 2022-05-26 RX ADMIN — INSULIN LISPRO 4 UNITS: 100 INJECTION, SOLUTION INTRAVENOUS; SUBCUTANEOUS at 16:26

## 2022-05-26 RX ADMIN — HEPARIN SODIUM 5000 UNITS: 5000 INJECTION INTRAVENOUS; SUBCUTANEOUS at 06:30

## 2022-05-26 RX ADMIN — HEPARIN SODIUM 5000 UNITS: 5000 INJECTION INTRAVENOUS; SUBCUTANEOUS at 15:21

## 2022-05-26 RX ADMIN — INSULIN LISPRO 8 UNITS: 100 INJECTION, SOLUTION INTRAVENOUS; SUBCUTANEOUS at 22:31

## 2022-05-26 ASSESSMENT — PAIN SCALES - GENERAL
PAINLEVEL_OUTOF10: 5
PAINLEVEL_OUTOF10: 0
PAINLEVEL_OUTOF10: 0
PAINLEVEL_OUTOF10: 10
PAINLEVEL_OUTOF10: 0

## 2022-05-26 ASSESSMENT — PAIN DESCRIPTION - ORIENTATION: ORIENTATION: RIGHT;UPPER

## 2022-05-26 ASSESSMENT — PAIN DESCRIPTION - LOCATION
LOCATION: LEG
LOCATION: GENERALIZED

## 2022-05-26 ASSESSMENT — PAIN DESCRIPTION - DESCRIPTORS: DESCRIPTORS: ACHING

## 2022-05-26 NOTE — PLAN OF CARE
Problem: Discharge Planning  Goal: Discharge to home or other facility with appropriate resources  Outcome: Progressing     Problem: Safety - Adult  Goal: Free from fall injury  Outcome: Progressing     Problem: Confusion  Goal: Confusion, delirium, dementia, or psychosis is improved or at baseline  Description: INTERVENTIONS:  1. Assess for possible contributors to thought disturbance, including medications, impaired vision or hearing, underlying metabolic abnormalities, dehydration, psychiatric diagnoses, and notify attending LIP  2. [place] high risk fall precautions, as indicated  3. Provide frequent short contacts to provide reality reorientation, refocusing and direction  4. Decrease environmental stimuli, including noise as appropriate  5. Monitor and intervene to maintain adequate nutrition, hydration, elimination, sleep and activity  6. If unable to ensure safety without constant attention obtain sitter and review sitter guidelines with assigned personnel  7. Initiate Psychosocial CNS and Spiritual Care consult, as indicated  Outcome: Progressing     Problem: Pain  Goal: Verbalizes/displays adequate comfort level or baseline comfort level  Outcome: Progressing     Problem: Skin/Tissue Integrity  Goal: Absence of new skin breakdown  Description: 1. Monitor for areas of redness and/or skin breakdown  2. Assess vascular access sites hourly  3. Every 4-6 hours minimum:  Change oxygen saturation probe site  4. Every 4-6 hours:  If on nasal continuous positive airway pressure, respiratory therapy assess nares and determine need for appliance change or resting period.   Outcome: Progressing     Problem: ABCDS Injury Assessment  Goal: Absence of physical injury  Outcome: Progressing     Problem: Chronic Conditions and Co-morbidities  Goal: Patient's chronic conditions and co-morbidity symptoms are monitored and maintained or improved  Outcome: Progressing

## 2022-05-26 NOTE — PROGRESS NOTES
CM reviewed pt chart for continued stay. Discharge plan remains for pt to return to [facility] when medically stable. Will continue to follow pt plan of care and assist with any additional supportive care needs that arise as appropriate.

## 2022-05-26 NOTE — PROGRESS NOTES
Hospitalist Progress Note   Admit Date:  2022  1:58 AM   Name:  Allie Rosario. Age:  36 y.o. Sex:  male  :  1980   MRN:  [id number]   Room:  [facility]/    Presenting Complaint: change in mental status and fever   Reason(s) for Admission: Sepsis [facility]) [A41.9]     Hospital Course & Interval History:     Patient with past medical history of    Spina bifida   S/P right BKA surgery   Obesity with current BMI of 34  Hypertension   GERD   Hepatic steatosis     Patient was brought to ER due to change in mental status and concern for sepsis with fever up to 103.3 F. Patient is found to have JENNIFER, pyuria, with pulmonary infiltrates. Patient is being treated for UTI and bacterial pneumonia. Subjective/24hr Events (22):    22   Patient is resting and lying flat in bed. Sleepy and falling asleep easily when called. Not giving information spontaneously. No chest pain. No shortness of breath. No report of nausea, vomiting. No blood per rectum. No blood per urine. 22   Patient has no fever. Patient is more alert today and more responsive to calls. No fever. No shaking. No chills. Maximal temperature is 99.1 F in the past 24 hours. No chest pain. No shortness of breath. Assessment & Plan:     Principal Problem:    Sepsis due to Escherichia coli (E. coli) ([facility])    UTI (urinary tract infection)    Pneumonia    Acute metabolic encephalopathy  Both urine and blood cultures grew E.coli. Patient was on Cefepime and Levofloxacin now. On Ceftriaxone as per sensitivity report. On Doxycycline to better cover pneumonia as well although it is likely this is the result of E.coli bacteremia. Active Problems:    Chronic respiratory failure with hypoxia (HCC)  From obesity   Likely having some degrees of OLIVIA as well. Type 2 diabetes mellitus (HCC)  Monitor blood sugar. Cover with insulin sliding scale accordingly. Blood sugar is in 200s ranges. Please call patient and let her know that her overnight oximetry indicates that her events have been reduced to 4.2 per hour which is considered well treated. Her average peak pressure is 7.6. No need to adjust pressure at this time. Average usage is 3.5 hours. Encouraged increased time spent on therapy.    JENNIFER (acute kidney injury) ([address]  Improved since admission. Monitor renal function and intake and output. Avoid nephrotoxic agents. Renal function is in normal range now. Hx of right BKA (HCC)  Noted       Thrombocytopenia ([address]  Platelet count has improved since admission and treatment of sepsis and UTI and pneumonia       Spina bifida ([address]  Noted       HTN (hypertension)  Patient is on Lisinopril and Metoprolol. BP is 147/97 mmHg today which is improved. Monitor. Severe obesity (BMI 35.0-35.9 with comorbidity) ([address]  Patient will be counseled for weight loss and maintenance of healthy weight when recovering from this acute illness. I have discussed the plan of care with patient and care team.      Discharge Planning:      to be determined. May be can discharge in 2-3 days. Diet:  ADULT DIET; Regular; 3 carb choices (45 gm/meal);  Low Sodium (2 gm)  DVT PPx: heparin SC   Code status: Full Code    Hospital Problems           Last Modified POA    * (Principal) Sepsis due to Escherichia coli (E. coli) ([address] 5/24/2022 Yes    Sepsis ([address] 5/24/2022 Yes    UTI (urinary tract infection) 5/23/2022 Yes    Pneumonia 5/23/2022 Yes    Chronic respiratory failure with hypoxia ([address] 5/23/2022 Yes    Type 2 diabetes mellitus ([address] (Chronic) 5/23/2022 Yes    JENNIFER (acute kidney injury) ([address] 5/21/2022 Yes    Hx of right BKA ([address] 5/23/2022 Yes    Thrombocytopenia ([address] 5/23/2022 Yes    Spina bifida ([address] 5/23/2022 Yes    HTN (hypertension) 5/23/2022 Yes    Severe obesity (BMI 35.0-35.9 with comorbidity) ([address] 5/23/2022 Yes    Acute metabolic encephalopathy 7/03/5524 Yes            Objective:     Patient Vitals for the past 24 hrs:   Temp Pulse Resp BP SpO2   05/26/22 1052 99.1 °F (37.3 °C) 97 20 (!) 147/78 94 %   05/26/22 0712 98.6 °F (37 °C) 97 18 (!) 135/92 98 %   05/26/22 0332 98.2 °F (36.8 °C) (!) 110 19 (!) 137/95 100 %   05/25/22 2313 98.6 °F (37 °C) 86 19 (!) 142/70 97 %   05/25/22 2220 -- -- 18 -- 95 %   05/25/22 1928 98.4 °F (36.9 °C) (!) 103 19 (!) 147/97 92 %   05/25/22 1550 98.1 °F (36.7 °C) 88 18 (!) 141/84 97 %   05/25/22 1308 -- -- 18 -- 98 %       Estimated body mass index is 34.32 kg/m² as calculated from the following:    Height as of 5/20/22: 5' 9\" (1.753 m). Weight as of this encounter: 232 lb 6.4 oz (105.4 kg). Intake/Output Summary (Last 24 hours) at 5/26/2022 1106  Last data filed at 5/26/2022 0853  Gross per 24 hour   Intake 120 ml   Output 1000 ml   Net -880 ml         Physical Exam:     Blood pressure (!) 147/78, pulse 97, temperature 99.1 °F (37.3 °C), resp. rate 20, weight 232 lb 6.4 oz (105.4 kg), SpO2 94 %. General:    Well nourished. No overt distress. Patient is lying flat in bed and resting. Patient appears more alert and easier to be waken up today. otherwise patient is sleeping most of the time. Head:  Normocephalic, atraumatic  Eyes:  Sclerae appear normal.  Pupils equally round. ENT:  Nares appear normal, no drainage. Moist oral mucosa  Neck:  No restricted ROM. Trachea midline   CV:   RRR. No m/r/g. No jugular venous distension. Lungs:   Diminished breath sound bilaterally at bases. No wheezing, rhonchi, or rales. Respirations even, unlabored  Abdomen: Bowel sounds present. Soft, nontender, nondistended. Extremities: No cyanosis or clubbing. No edema  Skin:     No rashes and normal coloration. Warm and dry. Neuro:  No observed localized weakness. Lethargic.      I have reviewed ordered lab tests and independently visualized imaging below:    Recent Labs:  Recent Results (from the past 48 hour(s))   POCT Glucose    Collection Time: 05/24/22 11:26 AM   Result Value Ref Range    POC Glucose 230 (H) 65 - 100 mg/dL    Performed by: Donna    EKG 12 Lead    Collection Time: 05/24/22 12:57 PM   Result Value Ref Range    Ventricular Rate 91 BPM    Atrial Rate 91 BPM    P-R Interval 178 ms    QRS Duration 92 ms    Q-T Interval 378 ms    QTc Calculation (Chase) 464 ms    P Axis 53 degrees    R Axis 33 degrees    T Axis 28 degrees    Diagnosis Normal sinus rhythm    POCT Glucose    Collection Time: 05/24/22  4:33 PM   Result Value Ref Range    POC Glucose 263 (H) 65 - 100 mg/dL    Performed by: Donna    POCT Glucose    Collection Time: 05/24/22  8:47 PM   Result Value Ref Range    POC Glucose 285 (H) 65 - 100 mg/dL    Performed by: [facility]    Basic Metabolic Panel    Collection Time: 05/25/22  5:03 AM   Result Value Ref Range    Sodium 134 (L) 138 - 145 mmol/L    Potassium 3.6 3.5 - 5.1 mmol/L    Chloride 96 (L) 98 - 107 mmol/L    CO2 29 21 - 32 mmol/L    Anion Gap 9 7 - 16 mmol/L    Glucose 258 (H) 65 - 100 mg/dL    BUN 22 6 - 23 MG/DL    CREATININE 0.90 0.8 - 1.5 MG/DL    GFR African American >60 >60 ml/min/1.73m2    GFR Non- >60 >60 ml/min/1.73m2    Calcium 9.5 8.3 - 10.4 MG/DL   CBC with Auto Differential    Collection Time: 05/25/22  5:03 AM   Result Value Ref Range    WBC 9.4 4.3 - 11.1 K/uL    RBC 4.20 (L) 4.23 - 5.6 M/uL    Hemoglobin 11.1 (L) 13.6 - 17.2 g/dL    Hematocrit 35.1 (L) 41.1 - 50.3 %    MCV 83.6 79.6 - 97.8 FL    MCH 26.4 26.1 - 32.9 PG    MCHC 31.6 31.4 - 35.0 g/dL    RDW 14.6 11.9 - 14.6 %    Platelets 159 986 - 575 K/uL    MPV 10.3 9.4 - 12.3 FL    nRBC 0.00 0.0 - 0.2 K/uL    Differential Type AUTOMATED      Seg Neutrophils 63 43 - 78 %    Lymphocytes 18 13 - 44 %    Monocytes 10 4.0 - 12.0 %    Eosinophils % 2 0.5 - 7.8 %    Basophils 1 0.0 - 2.0 %    Immature Granulocytes 6 (H) 0.0 - 5.0 %    Segs Absolute 5.9 1.7 - 8.2 K/UL    Absolute Lymph # 1.7 0.5 - 4.6 K/UL    Absolute Mono # 0.9 0.1 - 1.3 K/UL    Absolute Eos # 0.2 0.0 - 0.8 K/UL    Basophils Absolute 0.1 0.0 - 0.2 K/UL    Absolute Immature Granulocyte 0.6 (H) 0.0 - 0.5 K/UL   Ferritin    Collection Time: 05/25/22  5:03 AM   Result Value Ref Range    Ferritin 261 8 - 388 NG/ML   Transferrin Saturation    Collection Time: 05/25/22  5:03 AM   Result Value Ref Range    Iron 51 35 - 150 ug/dL    TIBC 268 250 - 450 ug/dL    TRANSFERRIN SATURATION 19 (L) >20 %   Vitamin B12    Collection Time: 05/25/22  5:03 AM   Result Value Ref Range    Vitamin B-12 687 193 - 986 pg/mL   Folate    Collection Time: 05/25/22  5:03 AM   Result Value Ref Range    Folate 18.8 (H) 3.1 - 17.5 ng/mL   POCT Glucose    Collection Time: 05/25/22  7:22 AM   Result Value Ref Range    POC Glucose 292 (H) 65 - 100 mg/dL    Performed by: PraneethindLogan    POCT Glucose    Collection Time: 05/25/22 10:57 AM   Result Value Ref Range    POC Glucose 471 (HH) 65 - 100 mg/dL    Performed by: ParvintLindLogan    POCT Glucose    Collection Time: 05/25/22  3:47 PM   Result Value Ref Range    POC Glucose 266 (H) 65 - 100 mg/dL    Performed by: ParvintLindLogan    POCT Glucose    Collection Time: 05/25/22  9:03 PM   Result Value Ref Range    POC Glucose 263 (H) 65 - 100 mg/dL    Performed by: Jessica Daniel    Basic Metabolic Panel    Collection Time: 05/26/22  5:01 AM   Result Value Ref Range    Sodium 133 (L) 138 - 145 mmol/L    Potassium 3.7 3.5 - 5.1 mmol/L    Chloride 96 (L) 98 - 107 mmol/L    CO2 25 21 - 32 mmol/L    Anion Gap 12 7 - 16 mmol/L    Glucose 224 (H) 65 - 100 mg/dL    BUN 21 6 - 23 MG/DL    CREATININE 0.70 (L) 0.8 - 1.5 MG/DL    GFR African American >60 >60 ml/min/1.73m2    GFR Non- >60 >60 ml/min/1.73m2    Calcium 9.3 8.3 - 10.4 MG/DL   CBC with Auto Differential    Collection Time: 05/26/22  5:01 AM   Result Value Ref Range    WBC 10.1 4.3 - 11.1 K/uL    RBC 4.28 4.23 - 5.6 M/uL    Hemoglobin 11.1 (L) 13.6 - 17.2 g/dL    Hematocrit 35.7 (L) 41.1 - 50.3 %    MCV 83.4 79.6 - 97.8 FL    MCH 25.9 (L) 26.1 - 32.9 PG    MCHC 31.1 (L) 31.4 - 35.0 g/dL    RDW 14.5 11.9 - 14.6 %    Platelets 730 684 - 255 K/uL    MPV 9.9 9.4 - 12.3 FL    nRBC 0.00 0.0 - 0.2 K/uL    Differential Type AUTOMATED      Seg Neutrophils 63 43 - 78 %    Lymphocytes 20 13 - 44 % Monocytes 8 4.0 - 12.0 %    Eosinophils % 1 0.5 - 7.8 %    Basophils 1 0.0 - 2.0 %    Immature Granulocytes 6 (H) 0.0 - 5.0 %    Segs Absolute 6.3 1.7 - 8.2 K/UL    Absolute Lymph # 2.1 0.5 - 4.6 K/UL    Absolute Mono # 0.8 0.1 - 1.3 K/UL    Absolute Eos # 0.1 0.0 - 0.8 K/UL    Basophils Absolute 0.1 0.0 - 0.2 K/UL    Absolute Immature Granulocyte 0.6 (H) 0.0 - 0.5 K/UL   POCT Glucose    Collection Time: 05/26/22  7:10 AM   Result Value Ref Range    POC Glucose 237 (H) 65 - 100 mg/dL    Performed by: Marcel    POCT Glucose    Collection Time: 05/26/22  8:48 AM   Result Value Ref Range    POC Glucose 247 (H) 65 - 100 mg/dL    Performed by: Oksana    POCT Glucose    Collection Time: 05/26/22 10:50 AM   Result Value Ref Range    POC Glucose 246 (H) 65 - 100 mg/dL    Performed by: Marcel          Other Studies:  CT ABDOMEN PELVIS RENAL STONE Additional Contrast? None    Result Date: 5/24/2022  EXAM: CT of the abdomen and pelvis without contrast. Indication: Bacteremia Comparison: Renal ultrasound, 5/21/2022 Multiple axial images were obtained through the abdomen and pelvis without IV contrast.  Radiation dose reduction techniques were used for this study: All CT scans performed at this facility use one or all of the following: Automated exposure control, adjustment of the mA and/or kVp according to patient's size, iterative reconstruction. FINDINGS: LOWER THORAX: Lung bases are clear. LIVER: Hepatic steatosis and hepatomegaly. BILIARY: The gallbladder is normal. No biliary duct dilation. SPLEEN: No splenomegaly. PANCREAS: No pancreatic mass. No pancreatic duct dilation. ADRENALS: No adrenal nodule or adrenal hypertrophy. URINARY SYSTEM: The kidneys are symmetric in size and cortical thickness. There is bilateral symmetric perinephric stranding. No contour deforming abnormality on the kidneys. No hydronephrosis. 5 mm calculus layering in the urinary bladder. Otherwise, no urinary tract calculi.  No hydroureter. There is mild periureteral fat stranding along the right mid ureter. The urinary bladder demonstrates diffuse wall thickening and underdistention. BOWEL: Stomach, small bowel, and large bowel are normal in course and caliber. No evidence of obstruction. The appendix is normal. VASCULAR: The abdominal aorta and iliac arterial system are nonaneurysmal. NODES: No lymphadenopathy. FLUID:  No free fluid. REPRODUCTIVE: Unremarkable. BONES/SOFT TISSUE: No fracture or aggressive osseous lesion. No soft tissue abnormality. 1. Symmetric bilateral perinephric inflammatory stranding and inflammatory stranding surrounding the right ureter. Recommend correlation for ascending urinary tract infection and pyelonephritis. 2. Additional wall thickening of the urinary bladder could represent cystitis or under distention. 3. 5 mm nonobstructing bladder calculus. 4. Hepatomegaly with steatosis.        Current Meds:  Current Facility-Administered Medications   Medication Dose Route Frequency    cefTRIAXone (ROCEPHIN) 2000 mg IVPB in NS 50ml minibag  2,000 mg IntraVENous Q24H    doxycycline hyclate (VIBRAMYCIN) capsule 100 mg  100 mg Oral Q12H    insulin glargine (LANTUS) injection vial 15 Units  15 Units SubCUTAneous Nightly    hydrALAZINE (APRESOLINE) injection 10 mg  10 mg IntraVENous Q6H PRN    albuterol (PROVENTIL) nebulizer solution 2.5 mg  2.5 mg Nebulization 4x daily    traZODone (DESYREL) tablet 50 mg  50 mg Oral Nightly PRN    lisinopril (PRINIVIL;ZESTRIL) tablet 10 mg  10 mg Oral Daily    fluticasone (FLONASE) 50 MCG/ACT nasal spray 2 spray  2 spray Each Nostril Daily    clonazePAM (KLONOPIN) tablet 0.5 mg  0.5 mg Oral BID PRN    escitalopram (LEXAPRO) tablet 15 mg  15 mg Oral Daily    metoprolol tartrate (LOPRESSOR) tablet 50 mg  50 mg Oral BID    glucose chewable tablet 16 g  4 tablet Oral PRN    dextrose bolus 10% 125 mL  125 mL IntraVENous PRN    Or    dextrose bolus 10% 250 mL  250 mL IntraVENous PRN    insulin lispro (HUMALOG) injection vial 0-16 Units  0-16 Units SubCUTAneous 4x Daily AC & HS    heparin (porcine) injection 5,000 Units  5,000 Units SubCUTAneous q8h    acetaminophen (TYLENOL) tablet 650 mg  650 mg Oral Q6H PRN       Signed:  Tyson Skiff, MD    Part of this note may have been written by using a voice dictation software. The note has been proof read but may still contain some grammatical/other typographical errors.

## 2022-05-26 NOTE — PLAN OF CARE
Problem: Discharge Planning  Goal: Discharge to home or other facility with appropriate resources  5/26/2022 0858 by Kavon Baptiste RN  Outcome: Progressing  Flowsheets (Taken 5/26/2022 0710)  Discharge to home or other facility with appropriate resources: Identify discharge learning needs (meds, wound care, etc)  5/25/2022 2027 by Marito Saunders RN  Outcome: Progressing     Problem: Safety - Adult  Goal: Free from fall injury  5/26/2022 0858 by Kavon Baptiste RN  Outcome: Progressing  5/25/2022 2027 by Marito Saunders RN  Outcome: Progressing     Problem: Confusion  Goal: Confusion, delirium, dementia, or psychosis is improved or at baseline  Description: INTERVENTIONS:  1. Assess for possible contributors to thought disturbance, including medications, impaired vision or hearing, underlying metabolic abnormalities, dehydration, psychiatric diagnoses, and notify attending LIP  2. [place] high risk fall precautions, as indicated  3. Provide frequent short contacts to provide reality reorientation, refocusing and direction  4. Decrease environmental stimuli, including noise as appropriate  5. Monitor and intervene to maintain adequate nutrition, hydration, elimination, sleep and activity  6. If unable to ensure safety without constant attention obtain sitter and review sitter guidelines with assigned personnel  7.  Initiate Psychosocial CNS and Spiritual Care consult, as indicated  5/26/2022 0858 by Kavon Baptiste RN  Outcome: Progressing  Flowsheets (Taken 5/26/2022 0710)  Effect of thought disturbance (confusion, delirium, dementia, or psychosis) are managed with adequate functional status: Decrease environmental stimuli, including noise as appropriate  5/25/2022 2027 by Marito Saunders RN  Outcome: Progressing     Problem: Pain  Goal: Verbalizes/displays adequate comfort level or baseline comfort level  5/26/2022 0858 by Kavon Baptiste RN  Outcome: Progressing  5/25/2022 2027 by Marito Saunders RN  Outcome: Progressing Problem: Skin/Tissue Integrity  Goal: Absence of new skin breakdown  Description: 1. Monitor for areas of redness and/or skin breakdown  2. Assess vascular access sites hourly  3. Every 4-6 hours minimum:  Change oxygen saturation probe site  4. Every 4-6 hours:  If on nasal continuous positive airway pressure, respiratory therapy assess nares and determine need for appliance change or resting period.   5/26/2022 0858 by Benny Hays RN  Outcome: Progressing  5/25/2022 2027 by Arturo Montilla RN  Outcome: Progressing     Problem: ABCDS Injury Assessment  Goal: Absence of physical injury  5/26/2022 0858 by Benny Hays RN  Outcome: Progressing  5/25/2022 2027 by Arturo Montilla RN  Outcome: Progressing     Problem: Chronic Conditions and Co-morbidities  Goal: Patient's chronic conditions and co-morbidity symptoms are monitored and maintained or improved  5/26/2022 0858 by Benny Hays RN  Outcome: Progressing  Flowsheets (Taken 5/26/2022 0710)  Care Plan - Patient's Chronic Conditions and Co-Morbidity Symptoms are Monitored and Maintained or Improved: Monitor and assess patient's chronic conditions and comorbid symptoms for stability, deterioration, or improvement  5/25/2022 2027 by Arturo Montilla RN  Outcome: Progressing

## 2022-05-26 NOTE — PLAN OF CARE
Problem: Discharge Planning  Goal: Discharge to home or other facility with appropriate resources  5/26/2022 1239 by Jasmyn Campuzano RN  Outcome: Progressing  5/26/2022 0858 by Jasmyn Campuzano RN  Outcome: Progressing  Flowsheets (Taken 5/26/2022 0710)  Discharge to home or other facility with appropriate resources: Identify discharge learning needs (meds, wound care, etc)     Problem: Safety - Adult  Goal: Free from fall injury  5/26/2022 1239 by Jasmyn Campuzano RN  Outcome: Progressing  5/26/2022 0858 by Jasmyn Campuzano RN  Outcome: Progressing     Problem: Confusion  Goal: Confusion, delirium, dementia, or psychosis is improved or at baseline  Description: INTERVENTIONS:  1. Assess for possible contributors to thought disturbance, including medications, impaired vision or hearing, underlying metabolic abnormalities, dehydration, psychiatric diagnoses, and notify attending LIP  2. [place] high risk fall precautions, as indicated  3. Provide frequent short contacts to provide reality reorientation, refocusing and direction  4. Decrease environmental stimuli, including noise as appropriate  5. Monitor and intervene to maintain adequate nutrition, hydration, elimination, sleep and activity  6. If unable to ensure safety without constant attention obtain sitter and review sitter guidelines with assigned personnel  7.  Initiate Psychosocial CNS and Spiritual Care consult, as indicated  5/26/2022 1239 by Jasmyn Campuzano RN  Outcome: Progressing  5/26/2022 0858 by Jasmyn Campuzano RN  Outcome: Progressing  Flowsheets (Taken 5/26/2022 0710)  Effect of thought disturbance (confusion, delirium, dementia, or psychosis) are managed with adequate functional status: Decrease environmental stimuli, including noise as appropriate     Problem: Pain  Goal: Verbalizes/displays adequate comfort level or baseline comfort level  5/26/2022 1239 by Jasmyn Campuzano RN  Outcome: Progressing  5/26/2022 0858 by Jasmyn Campuzano RN  Outcome: Progressing     Problem: Skin/Tissue Integrity  Goal: Absence of new skin breakdown  Description: 1. Monitor for areas of redness and/or skin breakdown  2. Assess vascular access sites hourly  3. Every 4-6 hours minimum:  Change oxygen saturation probe site  4. Every 4-6 hours:  If on nasal continuous positive airway pressure, respiratory therapy assess nares and determine need for appliance change or resting period.   5/26/2022 1239 by Yuri Mejias RN  Outcome: Progressing  5/26/2022 0858 by Yuri Mejias RN  Outcome: Progressing     Problem: ABCDS Injury Assessment  Goal: Absence of physical injury  5/26/2022 1239 by Yuri Mejias RN  Outcome: Progressing  5/26/2022 0858 by Yuri Mejias RN  Outcome: Progressing     Problem: Chronic Conditions and Co-morbidities  Goal: Patient's chronic conditions and co-morbidity symptoms are monitored and maintained or improved  5/26/2022 1239 by Yuri Mejias RN  Outcome: Progressing  5/26/2022 0858 by Yuri Mejias RN  Outcome: Progressing  Flowsheets (Taken 5/26/2022 0710)  Care Plan - Patient's Chronic Conditions and Co-Morbidity Symptoms are Monitored and Maintained or Improved: Monitor and assess patient's chronic conditions and comorbid symptoms for stability, deterioration, or improvement

## 2022-05-26 NOTE — PROGRESS NOTES
US Guided PIV access-   Skin was cleaned and disinfected prior to IV puncture. Ultrasound was used to find the vein which was compressible and does not have any ultrasound features of an artery or nerve bundle. Under real-time ultrasound guidance peripheral access was obtained in the left forearm using 20 G 1.75\" Peripheral IV catheter . Blood return was present and IV flushed without difficulty with no clinical signs of infiltration. IV dressing applied and there were no immediate complications noted and patient tolerated the procedure well.

## 2022-05-27 LAB
ANION GAP SERPL CALC-SCNC: 9 MMOL/L (ref 7–16)
BACTERIA SPEC CULT: ABNORMAL
BACTERIA SPEC CULT: NORMAL
BASOPHILS # BLD: 0.1 K/UL (ref 0–0.2)
BASOPHILS NFR BLD: 1 % (ref 0–2)
BUN SERPL-MCNC: 21 MG/DL (ref 6–23)
CALCIUM SERPL-MCNC: 9.4 MG/DL (ref 8.3–10.4)
CHLORIDE SERPL-SCNC: 100 MMOL/L (ref 98–107)
CO2 SERPL-SCNC: 24 MMOL/L (ref 21–32)
CREAT SERPL-MCNC: 0.8 MG/DL (ref 0.8–1.5)
DIFFERENTIAL METHOD BLD: ABNORMAL
EOSINOPHIL # BLD: 0.1 K/UL (ref 0–0.8)
EOSINOPHIL NFR BLD: 2 % (ref 0.5–7.8)
ERYTHROCYTE [DISTWIDTH] IN BLOOD BY AUTOMATED COUNT: 14.4 % (ref 11.9–14.6)
GLUCOSE BLD STRIP.AUTO-MCNC: 211 MG/DL (ref 65–100)
GLUCOSE BLD STRIP.AUTO-MCNC: 247 MG/DL (ref 65–100)
GLUCOSE BLD STRIP.AUTO-MCNC: 255 MG/DL (ref 65–100)
GLUCOSE BLD STRIP.AUTO-MCNC: 292 MG/DL (ref 65–100)
GLUCOSE SERPL-MCNC: 239 MG/DL (ref 65–100)
HCT VFR BLD AUTO: 37.3 % (ref 41.1–50.3)
HGB BLD-MCNC: 11.7 G/DL (ref 13.6–17.2)
HIV 1+2 AB+HIV1 P24 AG SERPL QL IA: NONREACTIVE
HIV 1/2 RESULT COMMENT: ABNORMAL
IMM GRANULOCYTES # BLD AUTO: 0.5 K/UL (ref 0–0.5)
IMM GRANULOCYTES NFR BLD AUTO: 6 % (ref 0–5)
LYMPHOCYTES # BLD: 2 K/UL (ref 0.5–4.6)
LYMPHOCYTES NFR BLD: 22 % (ref 13–44)
MCH RBC QN AUTO: 26.2 PG (ref 26.1–32.9)
MCHC RBC AUTO-ENTMCNC: 31.4 G/DL (ref 31.4–35)
MCV RBC AUTO: 83.4 FL (ref 79.6–97.8)
MONOCYTES # BLD: 0.9 K/UL (ref 0.1–1.3)
MONOCYTES NFR BLD: 9 % (ref 4–12)
NEUTS SEG # BLD: 5.7 K/UL (ref 1.7–8.2)
NEUTS SEG NFR BLD: 61 % (ref 43–78)
NRBC # BLD: 0 K/UL (ref 0–0.2)
PLATELET # BLD AUTO: 476 K/UL (ref 150–450)
PMV BLD AUTO: 9.8 FL (ref 9.4–12.3)
POTASSIUM SERPL-SCNC: 4.1 MMOL/L (ref 3.5–5.1)
RBC # BLD AUTO: 4.47 M/UL (ref 4.23–5.6)
SERVICE CMNT-IMP: ABNORMAL
SERVICE CMNT-IMP: NORMAL
SODIUM SERPL-SCNC: 133 MMOL/L (ref 136–145)
WBC # BLD AUTO: 9.4 K/UL (ref 4.3–11.1)

## 2022-05-27 PROCEDURE — 94760 N-INVAS EAR/PLS OXIMETRY 1: CPT

## 2022-05-27 PROCEDURE — 6370000000 HC RX 637 (ALT 250 FOR IP): Performed by: INTERNAL MEDICINE

## 2022-05-27 PROCEDURE — 85025 COMPLETE CBC W/AUTO DIFF WBC: CPT

## 2022-05-27 PROCEDURE — 2580000003 HC RX 258: Performed by: INTERNAL MEDICINE

## 2022-05-27 PROCEDURE — 6360000002 HC RX W HCPCS: Performed by: INTERNAL MEDICINE

## 2022-05-27 PROCEDURE — 82962 GLUCOSE BLOOD TEST: CPT

## 2022-05-27 PROCEDURE — 87389 HIV-1 AG W/HIV-1&-2 AB AG IA: CPT

## 2022-05-27 PROCEDURE — 94640 AIRWAY INHALATION TREATMENT: CPT

## 2022-05-27 PROCEDURE — 2700000000 HC OXYGEN THERAPY PER DAY

## 2022-05-27 PROCEDURE — 36415 COLL VENOUS BLD VENIPUNCTURE: CPT

## 2022-05-27 PROCEDURE — 6360000002 HC RX W HCPCS: Performed by: NURSE PRACTITIONER

## 2022-05-27 PROCEDURE — 6370000000 HC RX 637 (ALT 250 FOR IP): Performed by: FAMILY MEDICINE

## 2022-05-27 PROCEDURE — 6370000000 HC RX 637 (ALT 250 FOR IP): Performed by: NURSE PRACTITIONER

## 2022-05-27 PROCEDURE — 80048 BASIC METABOLIC PNL TOTAL CA: CPT

## 2022-05-27 PROCEDURE — 1100000003 HC PRIVATE W/ TELEMETRY

## 2022-05-27 RX ORDER — METOPROLOL TARTRATE 50 MG/1
50 TABLET, FILM COATED ORAL 2 TIMES DAILY
Status: CANCELLED | OUTPATIENT
Start: 2022-05-27

## 2022-05-27 RX ORDER — LISINOPRIL 5 MG/1
10 TABLET ORAL DAILY
Status: CANCELLED | OUTPATIENT
Start: 2022-05-27

## 2022-05-27 RX ORDER — ESCITALOPRAM OXALATE 10 MG/1
15 TABLET ORAL DAILY
Status: CANCELLED | OUTPATIENT
Start: 2022-05-27

## 2022-05-27 RX ORDER — FLUTICASONE PROPIONATE 50 MCG
2 SPRAY, SUSPENSION (ML) NASAL DAILY
Status: CANCELLED | OUTPATIENT
Start: 2022-05-27

## 2022-05-27 RX ORDER — BUSPIRONE HYDROCHLORIDE 5 MG/1
15 TABLET ORAL 3 TIMES DAILY
Status: CANCELLED | OUTPATIENT
Start: 2022-05-27

## 2022-05-27 RX ORDER — HYDROXYZINE PAMOATE 25 MG/1
25 CAPSULE ORAL EVERY 6 HOURS PRN
Status: DISCONTINUED | OUTPATIENT
Start: 2022-05-27 | End: 2022-05-28 | Stop reason: HOSPADM

## 2022-05-27 RX ORDER — FAMOTIDINE 20 MG/1
20 TABLET, FILM COATED ORAL DAILY
Status: CANCELLED | OUTPATIENT
Start: 2022-05-27

## 2022-05-27 RX ORDER — QUETIAPINE FUMARATE 100 MG/1
300 TABLET, FILM COATED ORAL DAILY
Status: CANCELLED | OUTPATIENT
Start: 2022-05-27

## 2022-05-27 RX ORDER — FENOFIBRATE 160 MG/1
160 TABLET ORAL DAILY
Status: CANCELLED | OUTPATIENT
Start: 2022-05-27

## 2022-05-27 RX ORDER — DOXYCYCLINE HYCLATE 50 MG/1
324 CAPSULE, GELATIN COATED ORAL DAILY
Status: CANCELLED | OUTPATIENT
Start: 2022-05-27

## 2022-05-27 RX ORDER — EZETIMIBE 10 MG/1
10 TABLET ORAL DAILY
Status: CANCELLED | OUTPATIENT
Start: 2022-05-27

## 2022-05-27 RX ADMIN — HEPARIN SODIUM 5000 UNITS: 5000 INJECTION INTRAVENOUS; SUBCUTANEOUS at 22:40

## 2022-05-27 RX ADMIN — HYDROXYZINE PAMOATE 25 MG: 25 CAPSULE ORAL at 06:40

## 2022-05-27 RX ADMIN — METOPROLOL TARTRATE 50 MG: 50 TABLET ORAL at 09:07

## 2022-05-27 RX ADMIN — INSULIN LISPRO 8 UNITS: 100 INJECTION, SOLUTION INTRAVENOUS; SUBCUTANEOUS at 12:08

## 2022-05-27 RX ADMIN — HYDROXYZINE PAMOATE 25 MG: 25 CAPSULE ORAL at 22:40

## 2022-05-27 RX ADMIN — INSULIN GLARGINE 15 UNITS: 100 INJECTION, SOLUTION SUBCUTANEOUS at 22:13

## 2022-05-27 RX ADMIN — HEPARIN SODIUM 5000 UNITS: 5000 INJECTION INTRAVENOUS; SUBCUTANEOUS at 15:14

## 2022-05-27 RX ADMIN — METOPROLOL TARTRATE 50 MG: 50 TABLET ORAL at 22:41

## 2022-05-27 RX ADMIN — ALBUTEROL SULFATE 2.5 MG: 2.5 SOLUTION RESPIRATORY (INHALATION) at 11:50

## 2022-05-27 RX ADMIN — ESCITALOPRAM OXALATE 15 MG: 10 TABLET ORAL at 09:07

## 2022-05-27 RX ADMIN — INSULIN LISPRO 4 UNITS: 100 INJECTION, SOLUTION INTRAVENOUS; SUBCUTANEOUS at 17:36

## 2022-05-27 RX ADMIN — TRAZODONE HYDROCHLORIDE 50 MG: 50 TABLET ORAL at 22:40

## 2022-05-27 RX ADMIN — LISINOPRIL 10 MG: 5 TABLET ORAL at 09:07

## 2022-05-27 RX ADMIN — DOXYCYCLINE HYCLATE 100 MG: 100 CAPSULE ORAL at 13:38

## 2022-05-27 RX ADMIN — CLONAZEPAM 0.5 MG: 0.5 TABLET ORAL at 22:40

## 2022-05-27 RX ADMIN — HYDROXYZINE PAMOATE 25 MG: 25 CAPSULE ORAL at 01:09

## 2022-05-27 RX ADMIN — INSULIN LISPRO 4 UNITS: 100 INJECTION, SOLUTION INTRAVENOUS; SUBCUTANEOUS at 22:12

## 2022-05-27 RX ADMIN — FLUTICASONE PROPIONATE 2 SPRAY: 50 SPRAY, METERED NASAL at 09:19

## 2022-05-27 RX ADMIN — ALBUTEROL SULFATE 2.5 MG: 2.5 SOLUTION RESPIRATORY (INHALATION) at 09:16

## 2022-05-27 RX ADMIN — INSULIN LISPRO 8 UNITS: 100 INJECTION, SOLUTION INTRAVENOUS; SUBCUTANEOUS at 07:43

## 2022-05-27 RX ADMIN — HEPARIN SODIUM 5000 UNITS: 5000 INJECTION INTRAVENOUS; SUBCUTANEOUS at 06:40

## 2022-05-27 RX ADMIN — DOXYCYCLINE HYCLATE 100 MG: 100 CAPSULE ORAL at 00:36

## 2022-05-27 RX ADMIN — CEFTRIAXONE 2000 MG: 2 INJECTION, POWDER, FOR SOLUTION INTRAMUSCULAR; INTRAVENOUS at 13:38

## 2022-05-27 ASSESSMENT — PAIN SCALES - GENERAL
PAINLEVEL_OUTOF10: 0

## 2022-05-27 NOTE — PROGRESS NOTES
Hospitalist Progress Note   Admit Date:  2022  1:58 AM   Name:  Brooklyn Woodson. Age:  36 y.o. Sex:  male  :  1980   MRN:  [id number]   Room:  [facility]/    Presenting Complaint: change in mental status and fever   Reason(s) for Admission: Sepsis [facility]) [A41.9]     Hospital Course & Interval History:     Patient with past medical history of    Spina bifida   S/P right BKA surgery   Obesity with current BMI of 34  Hypertension   GERD   Hepatic steatosis     Patient was brought to ER due to change in mental status and concern for sepsis with fever up to 103.3 F. Patient is found to have JENNIFER, pyuria, with pulmonary infiltrates. Patient is being treated for UTI and bacterial pneumonia. Subjective/24hr Events (22):    22   Patient is resting and lying flat in bed. Sleepy and falling asleep easily when called. Not giving information spontaneously. No chest pain. No shortness of breath. No report of nausea, vomiting. No blood per rectum. No blood per urine. 22   Patient has no fever. Patient is more alert today and more responsive to calls. No fever. No shaking. No chills. Maximal temperature is 99.1 F in the past 24 hours. No chest pain. No shortness of breath.      22   Patient mentioned to me that he was diagnosed with HIV infection and has been on Acyclovir and Tenofovir. There is no record of that in his history and profile from nursing home. Patient is willing to be tested for it. No fever. No shaking. No chills. No shortness of breath. Assessment & Plan:     Principal Problem:    Sepsis due to Escherichia coli (E. coli) ([facility])    UTI (urinary tract infection)    Pneumonia    Acute metabolic encephalopathy  Both urine and blood cultures grew E.coli. Patient was on Cefepime and Levofloxacin. Now he is on Ceftriaxone as per sensitivity report.    On Doxycycline to better cover pneumonia as well although it is likely this is the result of E.coli bacteremia. Active Problems:    Chronic respiratory failure with hypoxia (HCC)  From obesity   Likely having some degrees of OLIVIA as well. Patient will be counseled for weight loss and maintenance of healthy weight when recovering from this acute illness. Type 2 diabetes mellitus (HCC)  Monitor blood sugar. Cover with insulin sliding scale accordingly. Blood sugar is in 200s ranges. JENNIFER (acute kidney injury) ([address]  Improved since admission. Monitor renal function and intake and output. Avoid nephrotoxic agents. Renal function is in normal range now. Hx of right BKA (HCC)  Noted       Thrombocytopenia ([address]  Platelet count has improved since admission and treatment of sepsis and UTI and pneumonia       Spina bifida ([address]  Noted       HTN (hypertension)  Patient is on Lisinopril and Metoprolol. BP is 160/99 mmHg today which is improved. Monitor. Severe obesity (BMI 35.0-35.9 with comorbidity) ([address]  Patient will be counseled for weight loss and maintenance of healthy weight when recovering from this acute illness. I have discussed the plan of care with patient. Discharge Planning:      to be determined. May be can discharge in 2-3 days. Diet:  ADULT DIET; Regular; 3 carb choices (45 gm/meal);  Low Sodium (2 gm)  DVT PPx: heparin SC   Code status: Full Code    Hospital Problems           Last Modified POA    * (Principal) Sepsis due to Escherichia coli (E. coli) ([address] 5/24/2022 Yes    Sepsis ([address] 5/24/2022 Yes    UTI (urinary tract infection) 5/23/2022 Yes    Pneumonia 5/23/2022 Yes    Chronic respiratory failure with hypoxia ([address] 5/23/2022 Yes    Type 2 diabetes mellitus ([address] (Chronic) 5/23/2022 Yes    JENNIFER (acute kidney injury) ([address] 5/21/2022 Yes    Hx of right BKA ([address] 5/23/2022 Yes    Thrombocytopenia ([address] 5/23/2022 Yes    Spina bifida ([address] 5/23/2022 Yes    HTN (hypertension) 5/23/2022 Yes    Severe obesity (BMI 35.0-35.9 with Range    POC Glucose 237 (H) 65 - 100 mg/dL    Performed by: Marcel    POCT Glucose    Collection Time: 05/26/22  8:48 AM   Result Value Ref Range    POC Glucose 247 (H) 65 - 100 mg/dL    Performed by: Oksana    POCT Glucose    Collection Time: 05/26/22 10:50 AM   Result Value Ref Range    POC Glucose 246 (H) 65 - 100 mg/dL    Performed by: Marcel    POCT Glucose    Collection Time: 05/26/22  3:55 PM   Result Value Ref Range    POC Glucose 227 (H) 65 - 100 mg/dL    Performed by: Marcel    POCT Glucose    Collection Time: 05/26/22  9:08 PM   Result Value Ref Range    POC Glucose 278 (H) 65 - 100 mg/dL    Performed by: Baldemar    CBC with Auto Differential    Collection Time: 05/27/22  6:06 AM   Result Value Ref Range    WBC 9.4 4.3 - 11.1 K/uL    RBC 4.47 4.23 - 5.6 M/uL    Hemoglobin 11.7 (L) 13.6 - 17.2 g/dL    Hematocrit 37.3 (L) 41.1 - 50.3 %    MCV 83.4 79.6 - 97.8 FL    MCH 26.2 26.1 - 32.9 PG    MCHC 31.4 31.4 - 35.0 g/dL    RDW 14.4 11.9 - 14.6 %    Platelets 886 (H) 408 - 450 K/uL    MPV 9.8 9.4 - 12.3 FL    nRBC 0.00 0.0 - 0.2 K/uL    Differential Type AUTOMATED      Seg Neutrophils 61 43 - 78 %    Lymphocytes 22 13 - 44 %    Monocytes 9 4.0 - 12.0 %    Eosinophils % 2 0.5 - 7.8 %    Basophils 1 0.0 - 2.0 %    Immature Granulocytes 6 (H) 0.0 - 5.0 %    Segs Absolute 5.7 1.7 - 8.2 K/UL    Absolute Lymph # 2.0 0.5 - 4.6 K/UL    Absolute Mono # 0.9 0.1 - 1.3 K/UL    Absolute Eos # 0.1 0.0 - 0.8 K/UL    Basophils Absolute 0.1 0.0 - 0.2 K/UL    Absolute Immature Granulocyte 0.5 0.0 - 0.5 K/UL   POCT Glucose    Collection Time: 05/27/22  7:10 AM   Result Value Ref Range    POC Glucose 292 (H) 65 - 100 mg/dL    Performed by: Libby          Other Studies:  CT ABDOMEN PELVIS RENAL STONE Additional Contrast? None    Result Date: 5/24/2022  EXAM: CT of the abdomen and pelvis without contrast. Indication: Bacteremia Comparison: Renal ultrasound, 5/21/2022 Multiple axial images were obtained through the abdomen and pelvis without IV contrast.  Radiation dose reduction techniques were used for this study: All CT scans performed at this facility use one or all of the following: Automated exposure control, adjustment of the mA and/or kVp according to patient's size, iterative reconstruction. FINDINGS: LOWER THORAX: Lung bases are clear. LIVER: Hepatic steatosis and hepatomegaly. BILIARY: The gallbladder is normal. No biliary duct dilation. SPLEEN: No splenomegaly. PANCREAS: No pancreatic mass. No pancreatic duct dilation. ADRENALS: No adrenal nodule or adrenal hypertrophy. URINARY SYSTEM: The kidneys are symmetric in size and cortical thickness. There is bilateral symmetric perinephric stranding. No contour deforming abnormality on the kidneys. No hydronephrosis. 5 mm calculus layering in the urinary bladder. Otherwise, no urinary tract calculi. No hydroureter. There is mild periureteral fat stranding along the right mid ureter. The urinary bladder demonstrates diffuse wall thickening and underdistention. BOWEL: Stomach, small bowel, and large bowel are normal in course and caliber. No evidence of obstruction. The appendix is normal. VASCULAR: The abdominal aorta and iliac arterial system are nonaneurysmal. NODES: No lymphadenopathy. FLUID:  No free fluid. REPRODUCTIVE: Unremarkable. BONES/SOFT TISSUE: No fracture or aggressive osseous lesion. No soft tissue abnormality. 1. Symmetric bilateral perinephric inflammatory stranding and inflammatory stranding surrounding the right ureter. Recommend correlation for ascending urinary tract infection and pyelonephritis. 2. Additional wall thickening of the urinary bladder could represent cystitis or under distention. 3. 5 mm nonobstructing bladder calculus. 4. Hepatomegaly with steatosis.        Current Meds:  Current Facility-Administered Medications   Medication Dose Route Frequency    hydrOXYzine (VISTARIL) capsule 25 mg  25 mg Oral Q6H PRN    cefTRIAXone (ROCEPHIN) 2000 mg IVPB in NS 50ml minibag  2,000 mg IntraVENous Q24H    doxycycline hyclate (VIBRAMYCIN) capsule 100 mg  100 mg Oral Q12H    insulin glargine (LANTUS) injection vial 15 Units  15 Units SubCUTAneous Nightly    hydrALAZINE (APRESOLINE) injection 10 mg  10 mg IntraVENous Q6H PRN    albuterol (PROVENTIL) nebulizer solution 2.5 mg  2.5 mg Nebulization 4x daily    traZODone (DESYREL) tablet 50 mg  50 mg Oral Nightly PRN    lisinopril (PRINIVIL;ZESTRIL) tablet 10 mg  10 mg Oral Daily    fluticasone (FLONASE) 50 MCG/ACT nasal spray 2 spray  2 spray Each Nostril Daily    clonazePAM (KLONOPIN) tablet 0.5 mg  0.5 mg Oral BID PRN    escitalopram (LEXAPRO) tablet 15 mg  15 mg Oral Daily    metoprolol tartrate (LOPRESSOR) tablet 50 mg  50 mg Oral BID    glucose chewable tablet 16 g  4 tablet Oral PRN    dextrose bolus 10% 125 mL  125 mL IntraVENous PRN    Or    dextrose bolus 10% 250 mL  250 mL IntraVENous PRN    insulin lispro (HUMALOG) injection vial 0-16 Units  0-16 Units SubCUTAneous 4x Daily AC & HS    heparin (porcine) injection 5,000 Units  5,000 Units SubCUTAneous q8h    acetaminophen (TYLENOL) tablet 650 mg  650 mg Oral Q6H PRN       Signed:  Paulino Pederson MD    Part of this note may have been written by using a voice dictation software. The note has been proof read but may still contain some grammatical/other typographical errors.

## 2022-05-27 NOTE — PROGRESS NOTES
Patient c/o skin itching generalized, requested medication as given at home. Order given for hydroxyzine 25 mg po q 6 hr prn. Given per order. Will monitor.

## 2022-05-28 VITALS
SYSTOLIC BLOOD PRESSURE: 149 MMHG | TEMPERATURE: 98.2 F | OXYGEN SATURATION: 99 % | HEART RATE: 100 BPM | DIASTOLIC BLOOD PRESSURE: 88 MMHG | BODY MASS INDEX: 35.29 KG/M2 | WEIGHT: 239 LBS | RESPIRATION RATE: 16 BRPM

## 2022-05-28 LAB
ANION GAP SERPL CALC-SCNC: 11 MMOL/L (ref 7–16)
BASOPHILS # BLD: 0 K/UL (ref 0–0.2)
BASOPHILS NFR BLD: 0 % (ref 0–2)
BUN SERPL-MCNC: 20 MG/DL (ref 6–23)
CALCIUM SERPL-MCNC: 9.5 MG/DL (ref 8.3–10.4)
CHLORIDE SERPL-SCNC: 99 MMOL/L (ref 98–107)
CO2 SERPL-SCNC: 22 MMOL/L (ref 21–32)
CREAT SERPL-MCNC: 0.7 MG/DL (ref 0.8–1.5)
DIFFERENTIAL METHOD BLD: ABNORMAL
EOSINOPHIL # BLD: 0.2 K/UL (ref 0–0.8)
EOSINOPHIL NFR BLD: 2 % (ref 0.5–7.8)
ERYTHROCYTE [DISTWIDTH] IN BLOOD BY AUTOMATED COUNT: 14.5 % (ref 11.9–14.6)
GLUCOSE BLD STRIP.AUTO-MCNC: 235 MG/DL (ref 65–100)
GLUCOSE BLD STRIP.AUTO-MCNC: 242 MG/DL (ref 65–100)
GLUCOSE BLD STRIP.AUTO-MCNC: 284 MG/DL (ref 65–100)
GLUCOSE SERPL-MCNC: 234 MG/DL (ref 65–100)
HCT VFR BLD AUTO: 39.3 % (ref 41.1–50.3)
HGB BLD-MCNC: 12 G/DL (ref 13.6–17.2)
IMM GRANULOCYTES # BLD AUTO: 0.5 K/UL (ref 0–0.5)
IMM GRANULOCYTES NFR BLD AUTO: 5 % (ref 0–5)
LYMPHOCYTES # BLD: 2.2 K/UL (ref 0.5–4.6)
LYMPHOCYTES NFR BLD: 23 % (ref 13–44)
MCH RBC QN AUTO: 25.9 PG (ref 26.1–32.9)
MCHC RBC AUTO-ENTMCNC: 30.5 G/DL (ref 31.4–35)
MCV RBC AUTO: 84.7 FL (ref 79.6–97.8)
MONOCYTES # BLD: 0.8 K/UL (ref 0.1–1.3)
MONOCYTES NFR BLD: 9 % (ref 4–12)
NEUTS SEG # BLD: 5.7 K/UL (ref 1.7–8.2)
NEUTS SEG NFR BLD: 61 % (ref 43–78)
NRBC # BLD: 0 K/UL (ref 0–0.2)
PLATELET # BLD AUTO: 426 K/UL (ref 150–450)
PLATELET COMMENT: ADEQUATE
PMV BLD AUTO: 9.8 FL (ref 9.4–12.3)
POTASSIUM SERPL-SCNC: 4.2 MMOL/L (ref 3.5–5.1)
POTASSIUM SERPL-SCNC: 5.4 MMOL/L (ref 3.5–5.1)
RBC # BLD AUTO: 4.64 M/UL (ref 4.23–5.6)
RBC MORPH BLD: ABNORMAL
SARS-COV-2 RDRP RESP QL NAA+PROBE: NOT DETECTED
SERVICE CMNT-IMP: ABNORMAL
SODIUM SERPL-SCNC: 132 MMOL/L (ref 136–145)
SOURCE: NORMAL
WBC # BLD AUTO: 9.4 K/UL (ref 4.3–11.1)
WBC MORPH BLD: ABNORMAL

## 2022-05-28 PROCEDURE — 85025 COMPLETE CBC W/AUTO DIFF WBC: CPT

## 2022-05-28 PROCEDURE — 82962 GLUCOSE BLOOD TEST: CPT

## 2022-05-28 PROCEDURE — 94760 N-INVAS EAR/PLS OXIMETRY 1: CPT

## 2022-05-28 PROCEDURE — 6370000000 HC RX 637 (ALT 250 FOR IP): Performed by: FAMILY MEDICINE

## 2022-05-28 PROCEDURE — 2580000003 HC RX 258: Performed by: INTERNAL MEDICINE

## 2022-05-28 PROCEDURE — 6370000000 HC RX 637 (ALT 250 FOR IP): Performed by: NURSE PRACTITIONER

## 2022-05-28 PROCEDURE — 2700000000 HC OXYGEN THERAPY PER DAY

## 2022-05-28 PROCEDURE — 84132 ASSAY OF SERUM POTASSIUM: CPT

## 2022-05-28 PROCEDURE — 6360000002 HC RX W HCPCS: Performed by: INTERNAL MEDICINE

## 2022-05-28 PROCEDURE — 80048 BASIC METABOLIC PNL TOTAL CA: CPT

## 2022-05-28 PROCEDURE — 6370000000 HC RX 637 (ALT 250 FOR IP): Performed by: INTERNAL MEDICINE

## 2022-05-28 PROCEDURE — 36415 COLL VENOUS BLD VENIPUNCTURE: CPT

## 2022-05-28 PROCEDURE — 87635 SARS-COV-2 COVID-19 AMP PRB: CPT

## 2022-05-28 PROCEDURE — 6360000002 HC RX W HCPCS: Performed by: NURSE PRACTITIONER

## 2022-05-28 RX ORDER — CEPHALEXIN 500 MG/1
500 CAPSULE ORAL 2 TIMES DAILY
Qty: 20 CAPSULE | Refills: 0 | Status: SHIPPED | OUTPATIENT
Start: 2022-05-28 | End: 2022-06-07

## 2022-05-28 RX ORDER — AMLODIPINE BESYLATE 10 MG/1
10 TABLET ORAL DAILY
Status: DISCONTINUED | OUTPATIENT
Start: 2022-05-28 | End: 2022-05-28 | Stop reason: HOSPADM

## 2022-05-28 RX ADMIN — DOXYCYCLINE HYCLATE 100 MG: 100 CAPSULE ORAL at 12:30

## 2022-05-28 RX ADMIN — METOPROLOL TARTRATE 50 MG: 50 TABLET ORAL at 08:53

## 2022-05-28 RX ADMIN — INSULIN LISPRO 4 UNITS: 100 INJECTION, SOLUTION INTRAVENOUS; SUBCUTANEOUS at 12:22

## 2022-05-28 RX ADMIN — LISINOPRIL 10 MG: 5 TABLET ORAL at 08:53

## 2022-05-28 RX ADMIN — DOXYCYCLINE HYCLATE 100 MG: 100 CAPSULE ORAL at 01:22

## 2022-05-28 RX ADMIN — HEPARIN SODIUM 5000 UNITS: 5000 INJECTION INTRAVENOUS; SUBCUTANEOUS at 05:09

## 2022-05-28 RX ADMIN — ESCITALOPRAM OXALATE 15 MG: 10 TABLET ORAL at 08:53

## 2022-05-28 RX ADMIN — FLUTICASONE PROPIONATE 2 SPRAY: 50 SPRAY, METERED NASAL at 08:56

## 2022-05-28 RX ADMIN — AMLODIPINE BESYLATE 10 MG: 10 TABLET ORAL at 12:23

## 2022-05-28 RX ADMIN — HYDROXYZINE PAMOATE 25 MG: 25 CAPSULE ORAL at 12:29

## 2022-05-28 RX ADMIN — INSULIN LISPRO 8 UNITS: 100 INJECTION, SOLUTION INTRAVENOUS; SUBCUTANEOUS at 08:15

## 2022-05-28 RX ADMIN — CEFTRIAXONE 2000 MG: 2 INJECTION, POWDER, FOR SOLUTION INTRAMUSCULAR; INTRAVENOUS at 12:33

## 2022-05-28 ASSESSMENT — PAIN SCALES - GENERAL: PAINLEVEL_OUTOF10: 0

## 2022-05-28 NOTE — PROGRESS NOTES
Pt is for discharge today to return to [facility] where pt is a long term care resident. Transport via [facility] around [address] prepared to go with pt to facility. Pt aware of his return to his SNF today with no expressed concerns about his discharge plan. 05/28/22 350 [address] Discharge   Transition of Care Consult (CM Consult) SNF   Partner SNF Yes   [address] Provided?  No   Mode of Transport at Discharge BLS  ([facility])   Confirm Follow Up Transport Other (see comment)  (as arranged by SNF)

## 2022-05-28 NOTE — DISCHARGE SUMMARY
Hospitalist Discharge Summary   Admit Date:  2022  1:58 AM   DC Note date: 2022  Name:  Cristiana Hernandez. Age:  36 y.o. Sex:  male  :  1980   MRN:  [id number]   Room:  [facility]  PCP:  No primary care provider on file. Presenting Complaint: Blood Infection     Initial Admission Diagnosis: Sepsis ([facility] [A41.9]     Problem List for this Hospitalization (present on admission unless stated otherwise):  Principal Problem:    Sepsis due to Escherichia coli (E. coli) ([facility]  Active Problems:    Sepsis ([facility]    UTI (urinary tract infection)    Pneumonia    Chronic respiratory failure with hypoxia (HCC)    Type 2 diabetes mellitus ([facility]    JENNIFER (acute kidney injury) ([facility]    Hx of right BKA (HCC)    Thrombocytopenia ([facility]    Spina bifida ([facility]    HTN (hypertension)    Severe obesity (BMI 35.0-35.9 with comorbidity) ([facility]    Acute metabolic encephalopathy  Resolved Problems:    * No resolved hospital problems. *    Did Patient have Sepsis (YES OR NO): yes     Hospital Course:    Patient with past medical history of    Spina bifida   S/P right BKA surgery   Obesity with current BMI of 34  Hypertension   GERD   Hepatic steatosis      Patient was brought to ER due to change in mental status and concern for sepsis with fever up to 103.3 F.      Patient is found to have JENNIFER, pyuria, with pulmonary infiltrates.      Patient is being treated for UTI and possible bacterial pneumonia. Urine culture grew E.coli which was pan-sensitive. Patient was treated with IV Ceftriaxone and Doxycycline in hospital.     Initially patient was lethargic and sleepy most of the time. Psychotropic medications were held and gradually patient was waking up more and more alert and oriented. At the time of discharge, patient was fully alert and oriented. Patient said that he was HIV positive. There is no record of that from nursing home. I checked his HIV antibody which came back negative. Disposition: back to nursing home. Diet: ADULT DIET; Regular; 3 carb choices (45 gm/meal); Low Sodium (2 gm)  Code Status: Full Code    Follow Ups: with primary doctor       Follow up labs/diagnostics (ultimately defer to outpatient provider): As per primary doctor     Time spent in patient discharge and coordination 35 minutes. Plan was discussed with patient and care team.  All questions answered. Patient was stable at time of discharge. Instructions given to call a physician or return if any concerns. Current Discharge Medication List      START taking these medications    Details   cephALEXin (KEFLEX) 500 MG capsule Take 1 capsule by mouth 2 times daily for 10 days  Qty: 20 capsule, Refills: 0         CONTINUE these medications which have NOT CHANGED    Details   metoprolol tartrate (LOPRESSOR) 50 MG tablet Take 50 mg by mouth 2 times daily      fenofibrate (TRIGLIDE) 160 MG tablet Take 160 mg by mouth daily Take with meal. Preferably with largest meal of the day      sodium chloride (OCEAN, BABY AYR) 0.65 % nasal spray 1 spray by Nasal route 3 times daily as needed for Congestion Both nostrils for allergic rhinitis      lisinopril (PRINIVIL;ZESTRIL) 10 MG tablet Take 10 mg by mouth daily      cetirizine (ZYRTEC) 10 MG tablet Take 10 mg by mouth daily For post nasal drainage      guaiFENesin (ROBITUSSIN) 100 MG/5ML syrup Take 200 mg by mouth 4 times daily as needed for Cough      fluticasone (FLONASE) 50 MCG/ACT nasal spray 2 sprays by Each Nostril route daily      gabapentin (NEURONTIN) 300 MG capsule Take 600 mg by mouth 3 times daily. hydrOXYzine (ATARAX) 50 MG tablet Take 50 mg by mouth every 4 hours as needed for Anxiety      busPIRone (BUSPAR) 15 MG tablet Take 15 mg by mouth 3 times daily      methenamine (HIPREX) 1 g tablet Take 1 g by mouth every 12 hours      aspirin 325 mg EC tablet Take 325 mg by mouth daily      clonazePAM (KLONOPIN) 0.5 MG tablet Take 0.5 mg by mouth 2 times daily.       dicyclomine (BENTYL) 20 MG done this admission:  * No surgery found *    Consults this admission:  IP CONSULT TO NEPHROLOGY  IP CONSULT TO CASE MANAGEMENT  FOLLOW UP VISIT    Echocardiogram results:  No results found for this or any previous visit. Diagnostic Imaging/Tests:   CT HEAD WO CONTRAST    Result Date: 5/21/2022  Rachna Singh [id number] EXAM: Noncontrast CT head. INDICATION: Altered mental status. COMPARISON: None. TECHNIQUE: Noncontrast CT images of the head were obtained. Radiation dose reduction techniques were used for this study. Our CT scanners use one or all of the following: Automated exposure control, adjustment of the mA or kV according to patient size, iterative reconstruction. FINDINGS: The lateral and third ventricles are prominent in size, with more normal caliber fourth ventricle. There is no periventricular white matter hypodensity to suggest transependymal CSF flow. No acute infarct, hemorrhage or mass is identified. There is no mass effect, midline shift or depressed fracture. The visualized paranasal sinuses and mastoid air cells are clear, except for mild right sphenoid sinus mucosal thickening. The lateral and third ventricles are prominent in size for a patient of this age. Differential considerations would include hydrocephalus and central-predominant atrophy. US RETROPERITONEAL LIMITED    Result Date: 5/22/2022  Retroperitoneal ultrasound limited HISTORY: Acute renal sufficiency Real-time sonography of the kidneys was performed. Comparison: None FINDINGS: The examination is significantly limited due to patient body habitus. The right kidney particularly was poorly visualized with a measurement of approximately 10.3 mL in longitudinal dimension. No large mass or gross hydronephrosis is present on the right. The left kidney was better visualized measuring approximately 12.8 cm demonstrating increased renal echogenicity but without hydronephrosis.  Urinary bladder was only mildly fluid-filled but grossly anechoic. The liver is incidentally noted to be diffusely echogenic. 1. Significantly limited examination due to patient body habitus. The right kidney is poorly visualized. 2. Increased renal echogenicity of the left kidney. Differential diagnosis includes ATN and acute glomerulonephritis. 3. Hepatic steatosis. Labs: Results:       BMP, Mg, Phos Recent Labs     05/26/22  0501 05/26/22  0501 05/27/22  1025 05/28/22  0512 05/28/22  1207   *  --  133* 132*  --    K 3.7   < > 4.1 5.4* 4.2   CL 96*  --  100 99  --    CO2 25  --  24 22  --    BUN 21  --  21 20  --     < > = values in this interval not displayed. CBC Recent Labs     05/26/22  0501 05/27/22  0606 05/28/22  0512   WBC 10.1 9.4 9.4   RBC 4.28 4.47 4.64   HGB 11.1* 11.7* 12.0*   HCT 35.7* 37.3* 39.3*    476* 426      LFT No results for input(s): ALT, TP, ALB, GLOB in the last 72 hours.     Invalid input(s): SGOT, TBIL, AP, AGRAT, GPT   Cardiac Testing No results found for: BNP, CPK, RCK1, CKMB   Coagulation Tests No results found for: INR, APTT   A1c No results found for: HBA1C   Lipid Panel No results found for: CHOL, CHOLPOCT, CHOLX, CHLST, CHOLV, 196068, HDL, HDLC, LDL, LDLC, 781032, VLDLC, VLDL, TGLX, TRIGL   Thyroid Panel No results found for: TSH, T4, FT4     Most Recent UA Lab Results   Component Value Date    MUCUS 0 05/20/2022    UCOM RESULTS VERIFIED MANUALLY 05/20/2022          All Labs from Last 24 Hrs:  Recent Results (from the past 24 hour(s))   POCT Glucose    Collection Time: 05/27/22  3:32 PM   Result Value Ref Range    POC Glucose 211 (H) 65 - 100 mg/dL    Performed by: Libby    POCT Glucose    Collection Time: 05/27/22  9:37 PM   Result Value Ref Range    POC Glucose 247 (H) 65 - 100 mg/dL    Performed by: Baldemar    Basic Metabolic Panel    Collection Time: 05/28/22  5:12 AM   Result Value Ref Range    Sodium 132 (L) 136 - 145 mmol/L    Potassium 5.4 (H) 3.5 - 5.1 mmol/L    Chloride 99 98 - 107 mmol/L    CO2 22 21 - 32 mmol/L    Anion Gap 11 7 - 16 mmol/L    Glucose 234 (H) 65 - 100 mg/dL    BUN 20 6 - 23 MG/DL    CREATININE 0.70 (L) 0.8 - 1.5 MG/DL    GFR African American >60 >60 ml/min/1.73m2    GFR Non- >60 >60 ml/min/1.73m2    Calcium 9.5 8.3 - 10.4 MG/DL   CBC with Auto Differential    Collection Time: 05/28/22  5:12 AM   Result Value Ref Range    WBC 9.4 4.3 - 11.1 K/uL    RBC 4.64 4.23 - 5.6 M/uL    Hemoglobin 12.0 (L) 13.6 - 17.2 g/dL    Hematocrit 39.3 (L) 41.1 - 50.3 %    MCV 84.7 79.6 - 97.8 FL    MCH 25.9 (L) 26.1 - 32.9 PG    MCHC 30.5 (L) 31.4 - 35.0 g/dL    RDW 14.5 11.9 - 14.6 %    Platelets 131 039 - 609 K/uL    MPV 9.8 9.4 - 12.3 FL    nRBC 0.00 0.0 - 0.2 K/uL    Seg Neutrophils 61 43 - 78 %    Lymphocytes 23 13 - 44 %    Monocytes 9 4.0 - 12.0 %    Eosinophils % 2 0.5 - 7.8 %    Basophils 0 0.0 - 2.0 %    Immature Granulocytes 5 0.0 - 5.0 %    Segs Absolute 5.7 1.7 - 8.2 K/UL    Absolute Lymph # 2.2 0.5 - 4.6 K/UL    Absolute Mono # 0.8 0.1 - 1.3 K/UL    Absolute Eos # 0.2 0.0 - 0.8 K/UL    Basophils Absolute 0.0 0.0 - 0.2 K/UL    Absolute Immature Granulocyte 0.5 0.0 - 0.5 K/UL    RBC Comment NORMOCYTIC/NORMOCHROMIC      WBC Comment Result Confirmed By Smear      Platelet Comment ADEQUATE      Differential Type AUTOMATED     POCT Glucose    Collection Time: 05/28/22  7:27 AM   Result Value Ref Range    POC Glucose 284 (H) 65 - 100 mg/dL    Performed by: Dharmesh    POCT Glucose    Collection Time: 05/28/22 11:39 AM   Result Value Ref Range    POC Glucose 235 (H) 65 - 100 mg/dL    Performed by: Dharmesh    Potassium    Collection Time: 05/28/22 12:07 PM   Result Value Ref Range    Potassium 4.2 3.5 - 5.1 mmol/L       Allergies   Allergen Reactions    Latex        There is no immunization history on file for this patient.     Recent Vital Data:  Patient Vitals for the past 24 hrs:   Temp Pulse Resp BP SpO2   05/28/22 1141 99.3 °F (37.4 °C) 92 22 (!) 142/88 99 % 05/28/22 0724 99.3 °F (37.4 °C) (!) 107 14 (!) 136/94 97 %   05/28/22 0702 -- 98 -- -- 100 %   05/28/22 0337 99.3 °F (37.4 °C) 99 20 (!) 154/96 100 %   05/28/22 0024 -- -- -- (!) 162/92 --   05/27/22 2339 99.5 °F (37.5 °C) 100 18 (!) 167/102 100 %   05/27/22 2029 -- -- -- -- 98 %   05/27/22 1951 98.6 °F (37 °C) 92 18 (!) 151/92 99 %   05/27/22 1529 98.1 °F (36.7 °C) (!) 103 18 (!) 149/94 98 %       Oxygen Therapy  SpO2: 99 %  Pulse Oximeter Device Mode: Intermittent  Pulse Oximeter Device Location: Finger  O2 Device: Nasal cannula  FiO2 : 36 %  O2 Flow Rate (L/min): 4 L/min    Estimated body mass index is 35.29 kg/m² as calculated from the following:    Height as of 5/20/22: 5' 9\" (1.753 m). Weight as of this encounter: 239 lb (108.4 kg). Intake/Output Summary (Last 24 hours) at 5/28/2022 1342  Last data filed at 5/28/2022 0724  Gross per 24 hour   Intake 50 ml   Output 400 ml   Net -350 ml         Physical Exam:    BP (!) 142/88   Pulse 92   Temp 99.3 °F (37.4 °C) (Oral)   Resp 22   Wt 239 lb (108.4 kg)   SpO2 99%   BMI 35.29 kg/m²      General:          Well nourished. No overt distress. Patient is lying flat in bed and resting. Patient is alert. alert and oriented to place, time and person. BMI of 35  Head:               Normocephalic, atraumatic  Eyes:               Sclerae appear normal.  Pupils equally round. ENT:                Nares appear normal, no drainage. Moist oral mucosa  Neck:               No restricted ROM. Trachea midline   CV:                  RRR. No m/r/g. No jugular venous distension. Lungs:             Diminished breath sound bilaterally at bases. No wheezing, rhonchi, or rales. Respirations even, unlabored  Abdomen: Bowel sounds present. Soft, nontender, distended due to truncal obesity   Extremities:     No cyanosis or clubbing. No edema  Skin:                No rashes and normal coloration. Warm and dry.     Neuro:             No observed localized weakness. Lethargic. Signed:  Jose Kolb MD    Part of this note may have been written by using a voice dictation software. The note has been proof read but may still contain some grammatical/other typographical errors.

## 2022-05-28 NOTE — PLAN OF CARE
Problem: Discharge Planning  Goal: Discharge to home or other facility with appropriate resources  Outcome: Progressing  Flowsheets (Taken 5/27/2022 1951)  Discharge to home or other facility with appropriate resources: Identify barriers to discharge with patient and caregiver     Problem: Safety - Adult  Goal: Free from fall injury  Outcome: Progressing  Flowsheets  Taken 5/27/2022 1951 by Claudia Sanchez RN  Free From Fall Injury: Instruct family/caregiver on patient safety  Taken 5/27/2022 1606 by Trino Horan RN  Free From Fall Injury: Instruct family/caregiver on patient safety     Problem: Confusion  Goal: Confusion, delirium, dementia, or psychosis is improved or at baseline  Description: INTERVENTIONS:  1. Assess for possible contributors to thought disturbance, including medications, impaired vision or hearing, underlying metabolic abnormalities, dehydration, psychiatric diagnoses, and notify attending LIP  2. [place] high risk fall precautions, as indicated  3. Provide frequent short contacts to provide reality reorientation, refocusing and direction  4. Decrease environmental stimuli, including noise as appropriate  5. Monitor and intervene to maintain adequate nutrition, hydration, elimination, sleep and activity  6. If unable to ensure safety without constant attention obtain sitter and review sitter guidelines with assigned personnel  7. Initiate Psychosocial CNS and Spiritual Care consult, as indicated  Outcome: Progressing     Problem: Pain  Goal: Verbalizes/displays adequate comfort level or baseline comfort level  Outcome: Progressing     Problem: Skin/Tissue Integrity  Goal: Absence of new skin breakdown  Description: 1. Monitor for areas of redness and/or skin breakdown  2. Assess vascular access sites hourly  3. Every 4-6 hours minimum:  Change oxygen saturation probe site  4.   Every 4-6 hours:  If on nasal continuous positive airway pressure, respiratory therapy assess nares and determine need for appliance change or resting period.   Outcome: Progressing     Problem: ABCDS Injury Assessment  Goal: Absence of physical injury  Outcome: Progressing     Problem: Chronic Conditions and Co-morbidities  Goal: Patient's chronic conditions and co-morbidity symptoms are monitored and maintained or improved  Outcome: Progressing  Flowsheets (Taken 5/27/2022 1951)  Care Plan - Patient's Chronic Conditions and Co-Morbidity Symptoms are Monitored and Maintained or Improved: Monitor and assess patient's chronic conditions and comorbid symptoms for stability, deterioration, or improvement

## 2022-05-28 NOTE — PROGRESS NOTES
Hourly rounds completed. Pt is Alert and oriented. Pt denies any pain at this time. Bed is in low locked position, call light and bedside table within reach. Will continue to monitor and give report to the oncoming nurse.

## 2022-05-28 NOTE — PROGRESS NOTES
Report called to [address] Left arm PIV removed and has no signs or symptoms of edema, redness, drainage, no s/sx's of infection at IV site.

## 2022-05-31 ENCOUNTER — CARE COORDINATION (OUTPATIENT)
Dept: CARE COORDINATION | Facility: CLINIC | Age: 42
End: 2022-05-31

## 2022-05-31 NOTE — CARE COORDINATION
No transition of care outreach indicated due to patient discharge to a [address] [place] [address].

## 2022-06-01 LAB
EKG ATRIAL RATE: 91 BPM
EKG DIAGNOSIS: NORMAL
EKG P AXIS: 53 DEGREES
EKG P-R INTERVAL: 178 MS
EKG Q-T INTERVAL: 378 MS
EKG QRS DURATION: 92 MS
EKG QTC CALCULATION (BAZETT): 464 MS
EKG R AXIS: 33 DEGREES
EKG T AXIS: 28 DEGREES
EKG VENTRICULAR RATE: 91 BPM

## 2022-06-22 PROBLEM — N39.0 UTI (URINARY TRACT INFECTION): Status: RESOLVED | Noted: 2022-05-23 | Resolved: 2022-06-22

## 2022-06-23 ENCOUNTER — APPOINTMENT (OUTPATIENT)
Dept: GENERAL RADIOLOGY | Age: 42
End: 2022-06-23
Payer: MEDICARE

## 2022-06-23 ENCOUNTER — HOSPITAL ENCOUNTER (EMERGENCY)
Age: 42
Discharge: HOME | End: 2022-06-24
Attending: EMERGENCY MEDICINE
Payer: MEDICARE

## 2022-06-23 VITALS
RESPIRATION RATE: 20 BRPM | TEMPERATURE: 98.5 F | OXYGEN SATURATION: 97 % | DIASTOLIC BLOOD PRESSURE: 93 MMHG | WEIGHT: 239 LBS | SYSTOLIC BLOOD PRESSURE: 145 MMHG | BODY MASS INDEX: 35.29 KG/M2 | HEART RATE: 118 BPM

## 2022-06-23 DIAGNOSIS — L89.90 PRESSURE INJURY OF SKIN, UNSPECIFIED INJURY STAGE, UNSPECIFIED LOCATION: ICD-10-CM

## 2022-06-23 DIAGNOSIS — R05.9 COUGH: Primary | ICD-10-CM

## 2022-06-23 LAB
ALBUMIN SERPL-MCNC: 2.7 G/DL (ref 3.5–5)
ALBUMIN/GLOB SERPL: 0.6 {RATIO} (ref 1.2–3.5)
ALP SERPL-CCNC: 41 U/L (ref 50–136)
ALT SERPL-CCNC: 14 U/L (ref 12–65)
ANION GAP SERPL CALC-SCNC: 8 MMOL/L (ref 7–16)
AST SERPL-CCNC: 23 U/L (ref 15–37)
BASOPHILS # BLD: 0.1 K/UL (ref 0–0.2)
BASOPHILS NFR BLD: 1 % (ref 0–2)
BILIRUB SERPL-MCNC: 0.3 MG/DL (ref 0.2–1.1)
BUN SERPL-MCNC: 16 MG/DL (ref 6–23)
CALCIUM SERPL-MCNC: 9.1 MG/DL (ref 8.3–10.4)
CHLORIDE SERPL-SCNC: 100 MMOL/L (ref 98–107)
CO2 SERPL-SCNC: 25 MMOL/L (ref 21–32)
CREAT SERPL-MCNC: 1.3 MG/DL (ref 0.8–1.5)
DIFFERENTIAL METHOD BLD: ABNORMAL
EOSINOPHIL # BLD: 0.2 K/UL (ref 0–0.8)
EOSINOPHIL NFR BLD: 4 % (ref 0.5–7.8)
ERYTHROCYTE [DISTWIDTH] IN BLOOD BY AUTOMATED COUNT: 15.3 % (ref 11.9–14.6)
GLOBULIN SER CALC-MCNC: 4.7 G/DL (ref 2.3–3.5)
GLUCOSE SERPL-MCNC: 278 MG/DL (ref 65–100)
HCT VFR BLD AUTO: 31.2 % (ref 41.1–50.3)
HGB BLD-MCNC: 9.7 G/DL (ref 13.6–17.2)
IMM GRANULOCYTES # BLD AUTO: 0.1 K/UL (ref 0–0.5)
IMM GRANULOCYTES NFR BLD AUTO: 2 % (ref 0–5)
LACTATE SERPL-SCNC: 1.5 MMOL/L (ref 0.4–2)
LYMPHOCYTES # BLD: 2.4 K/UL (ref 0.5–4.6)
LYMPHOCYTES NFR BLD: 39 % (ref 13–44)
MAGNESIUM SERPL-MCNC: 2.2 MG/DL (ref 1.8–2.4)
MCH RBC QN AUTO: 26.3 PG (ref 26.1–32.9)
MCHC RBC AUTO-ENTMCNC: 31.1 G/DL (ref 31.4–35)
MCV RBC AUTO: 84.6 FL (ref 79.6–97.8)
MONOCYTES # BLD: 0.6 K/UL (ref 0.1–1.3)
MONOCYTES NFR BLD: 10 % (ref 4–12)
NEUTS SEG # BLD: 2.8 K/UL (ref 1.7–8.2)
NEUTS SEG NFR BLD: 44 % (ref 43–78)
NRBC # BLD: 0 K/UL (ref 0–0.2)
PLATELET # BLD AUTO: 303 K/UL (ref 150–450)
PMV BLD AUTO: 10 FL (ref 9.4–12.3)
POTASSIUM SERPL-SCNC: 5.1 MMOL/L (ref 3.5–5.1)
PROT SERPL-MCNC: 7.4 G/DL (ref 6.3–8.2)
RBC # BLD AUTO: 3.69 M/UL (ref 4.23–5.6)
SODIUM SERPL-SCNC: 133 MMOL/L (ref 136–145)
WBC # BLD AUTO: 6.2 K/UL (ref 4.3–11.1)

## 2022-06-23 PROCEDURE — 71045 X-RAY EXAM CHEST 1 VIEW: CPT

## 2022-06-23 PROCEDURE — 99285 EMERGENCY DEPT VISIT HI MDM: CPT

## 2022-06-23 PROCEDURE — 85025 COMPLETE CBC W/AUTO DIFF WBC: CPT

## 2022-06-23 PROCEDURE — 83605 ASSAY OF LACTIC ACID: CPT

## 2022-06-23 PROCEDURE — 6370000000 HC RX 637 (ALT 250 FOR IP): Performed by: EMERGENCY MEDICINE

## 2022-06-23 PROCEDURE — 80053 COMPREHEN METABOLIC PANEL: CPT

## 2022-06-23 PROCEDURE — 83735 ASSAY OF MAGNESIUM: CPT

## 2022-06-23 PROCEDURE — 87040 BLOOD CULTURE FOR BACTERIA: CPT

## 2022-06-23 RX ORDER — BENZONATATE 200 MG/1
200 CAPSULE ORAL 3 TIMES DAILY PRN
Qty: 30 CAPSULE | Refills: 0 | Status: SHIPPED | OUTPATIENT
Start: 2022-06-23 | End: 2022-06-30

## 2022-06-23 RX ORDER — BENZONATATE 100 MG/1
200 CAPSULE ORAL
Status: COMPLETED | OUTPATIENT
Start: 2022-06-23 | End: 2022-06-23

## 2022-06-23 RX ADMIN — BENZONATATE 200 MG: 100 CAPSULE ORAL at 23:31

## 2022-06-23 ASSESSMENT — PAIN - FUNCTIONAL ASSESSMENT
PAIN_FUNCTIONAL_ASSESSMENT: 0-10
PAIN_FUNCTIONAL_ASSESSMENT: NONE - DENIES PAIN

## 2022-06-23 ASSESSMENT — ENCOUNTER SYMPTOMS
FACIAL SWELLING: 0
VOMITING: 0
ABDOMINAL PAIN: 0
COUGH: 1
BACK PAIN: 1
DIARRHEA: 0
SHORTNESS OF BREATH: 1

## 2022-06-24 LAB
EKG ATRIAL RATE: 116 BPM
EKG DIAGNOSIS: NORMAL
EKG P AXIS: 54 DEGREES
EKG P-R INTERVAL: 165 MS
EKG Q-T INTERVAL: 333 MS
EKG QRS DURATION: 87 MS
EKG QTC CALCULATION (BAZETT): 463 MS
EKG R AXIS: 60 DEGREES
EKG T AXIS: 42 DEGREES
EKG VENTRICULAR RATE: 116 BPM

## 2022-06-24 NOTE — ED NOTES
I have reviewed discharge instructions with the patient. The patient verbalized understanding. Patient left ED via Discharge Method: stretcher to SNF with [facility]. Opportunity for questions and clarification provided. Patient given 1 scripts. To continue your aftercare when you leave the hospital, you may receive an automated call from our care team to check in on how you are doing. This is a free service and part of our promise to provide the best care and service to meet your aftercare needs.  If you have questions, or wish to unsubscribe from this service please call [phone]. Thank you for Choosing our [facility] Emergency Department.       Jodie Triplett RN  06/24/22 0135

## 2022-06-24 NOTE — ED PROVIDER NOTES
[employer] Emergency Department Provider Note                   PCP:                No primary care provider on file. Age: 36 y.o. Sex: male       ICD-10-CM    1. Cough  R05.9    2. Pressure injury of skin, unspecified injury stage, unspecified location  L89.90        DISPOSITION Decision To Discharge 06/23/2022 11:27:38 PM       New Prescriptions    BENZONATATE (TESSALON) 200 MG CAPSULE    Take 1 capsule by mouth 3 times daily as needed for Cough       Orders Placed This Encounter   Procedures    Blood Culture 1    Blood Culture 2    XR CHEST PORTABLE    CBC with Auto Differential    Comprehensive Metabolic Panel    Magnesium    Lactic Acid    Cardiac Monitor - ED Only    Continuous Pulse Oximetry    EKG 12 Lead    Saline lock Jenni Gomez MD 11:29 PM      MDM  Number of Diagnoses or Management Options  Cough  Pressure injury of skin, unspecified injury stage, unspecified location  Diagnosis management comments: I wore appropriate PPE throughout this patient's ED visit. Jade Jorgensen MD, 9:43 PM    11:28 PM  No pneumonia. No sign of sepsis. Will prescribe Tessalon Perles. Amount and/or Complexity of Data Reviewed  Clinical lab tests: ordered and reviewed  Tests in the radiology section of CPT®: ordered and reviewed  Tests in the medicine section of CPT®: reviewed and ordered  Review and summarize past medical records: yes  Independent visualization of images, tracings, or specimens: yes         Mikie Batista is a 36 y.o. male who presents to the Emergency Department with chief complaint of    Chief Complaint   Patient presents with    Cough      79-year-old male with multiple medical problems presents with persistent cough since leaving the hospital about 2 weeks ago. He was admitted for sepsis, UTI, and pneumonia. He states he has tried all cough medicines and antibiotics without improvement.   Cough is occasionally productive of clear, white, yellow, and red sputum. He believes that the cough is making his genital sores from chronic incontinence worse. He reports chest pain and shortness of breath. He is on 4 L of oxygen at all times. He has occasional wheezing. No documented fever since leaving the hospital.  He was tested for COVID and it was negative. Hospitalist Discharge Summary  Admit Date:     2022  1:58 AM   DC Note date: 2022  Name:              Brooklyn Woodson. Age:                 36 y.o. Sex:                 male  :               1980   MRN:               [id number]   Room:              [facility]  PCP:                No primary care provider on file.     Presenting Complaint: Blood Infection     Initial Admission Diagnosis: Sepsis ([address] (A41.9)      Problem List for this Hospitalization (present on admission unless stated otherwise):  Principal Problem:    Sepsis due to Escherichia coli (E. coli) ([address]  Active Problems:    Sepsis ([address]    UTI (urinary tract infection)    Pneumonia    Chronic respiratory failure with hypoxia (HCC)    Type 2 diabetes mellitus ([address]    JENNIFER (acute kidney injury) ([address]    Hx of right BKA ([address]    Thrombocytopenia ([address]    Spina bifida ([address]    HTN (hypertension)    Severe obesity (BMI 35.0-35.9 with comorbidity) ([address]    Acute metabolic encephalopathy  Resolved Problems:    * No resolved hospital problems. *     Did Patient have Sepsis (YES OR NO): yes      Hospital Course:     Patient with past medical history of    Spina bifida   S/P right BKA surgery   Obesity with current BMI of 34  Hypertension   GERD   Hepatic steatosis      Patient was brought to ER due to change in mental status and concern for sepsis with fever up to 103.3 F.      Patient is found to have JENNIFER, pyuria, with pulmonary infiltrates.      Patient is being treated for UTI and possible bacterial pneumonia.      Urine culture grew E.coli which was pan-sensitive.  Patient was treated with IV Ceftriaxone and Doxycycline in hospital.    Initially patient was lethargic and sleepy most of the time. Psychotropic medications were held and gradually patient was waking up more and more alert and oriented. At the time of discharge, patient was fully alert and oriented.      Patient said that he was HIV positive. There is no record of that from nursing home. I checked his HIV antibody which came back negative.         Disposition: back to nursing home. Diet: ADULT DIET; Regular; 3 carb choices (45 gm/meal); Low Sodium (2 gm)  Code Status: Full Code            All other systems reviewed and are negative. Review of Systems   Constitutional: Positive for fatigue. Negative for fever. HENT: Negative for facial swelling. Eyes: Negative for visual disturbance. Respiratory: Positive for cough and shortness of breath. Cardiovascular: Positive for chest pain. Gastrointestinal: Negative for abdominal pain, diarrhea and vomiting. Musculoskeletal: Positive for back pain. Negative for joint swelling. Skin: Positive for wound. Negative for rash. Neurological: Negative for speech difficulty. Psychiatric/Behavioral: Negative for confusion. All other systems reviewed and are negative. Past Medical History:   Diagnosis Date    Depression     GERD (gastroesophageal reflux disease)     Heart disease     Hypertension     Neurogenic bladder, NOS     [facility]     Spina bifida without mention of hydrocephalus, unspecified region     Stress incontinence, male     Toxic effect of latex(989.82)     Urinary tract infection, site not specified         Past Surgical History:   Procedure Laterality Date    ORTHOPEDIC SURGERY Right     foot surgery    UROLOGICAL SURGERY      artificial urinary sphincter        No family history on file.         Social Connections:     Frequency of Communication with Friends and Family: Not on file    Frequency of Social Gatherings with Friends and Family: Not on file    Attends Zoroastrianism Services: Not on file    Active Member of Clubs or Organizations: Not on file    Attends Club or Organization Meetings: Not on file    Marital Status: Not on file        Allergies   Allergen Reactions    Latex         Vitals signs and nursing note reviewed. Patient Vitals for the past 4 hrs:   Temp Pulse Resp BP SpO2   06/23/22 2138 -- -- -- (!) 145/93 --   06/23/22 2119 -- (!) 118 -- -- --   06/23/22 2117 98.5 °F (36.9 °C) (!) 123 20 (!) 145/106 97 %          Physical Exam  Vitals and nursing note reviewed. Constitutional:       Appearance: Normal appearance. He is obese. Comments: Chronically ill-appearing   HENT:      Head: Normocephalic and atraumatic. Nose: Nose normal.      Mouth/Throat:      Mouth: Mucous membranes are moist.   Eyes:      Extraocular Movements: Extraocular movements intact. Pupils: Pupils are equal, round, and reactive to light. Cardiovascular:      Rate and Rhythm: Regular rhythm. Tachycardia present. Pulmonary:      Effort: Pulmonary effort is normal. No respiratory distress. Breath sounds: Wheezing present. Abdominal:      General: Abdomen is flat. There is no distension. Musculoskeletal:         General: No deformity. Normal range of motion. Cervical back: Normal range of motion and neck supple. Comments: Right BKA   Skin:     General: Skin is warm and dry. Findings: Rash present. Comments: Stage I and II sacral decubitus ulcers extending into the scrotum and bilateral posterior thighs   Neurological:      General: No focal deficit present. Mental Status: He is alert. Mental status is at baseline.    Psychiatric:         Mood and Affect: Mood normal.          Procedures    ED EKG Interpretation  EKG was interpreted in the absence of a cardiologist.    Rate: Rate: Tachycardia  EKG Interpretation: EKG Interpretation: sinus rhythm  ST Segments: Nonspecific ST segments - NO STEMI    Labs Reviewed   CBC WITH AUTO DIFFERENTIAL - Abnormal; Notable for the following components:       Result Value    RBC 3.69 (*)     Hemoglobin 9.7 (*)     Hematocrit 31.2 (*)     MCHC 31.1 (*)     RDW 15.3 (*)     All other components within normal limits   COMPREHENSIVE METABOLIC PANEL - Abnormal; Notable for the following components:    Sodium 133 (*)     Glucose 278 (*)     Alk Phosphatase 41 (*)     Albumin 2.7 (*)     Globulin 4.7 (*)     Albumin/Globulin Ratio 0.6 (*)     All other components within normal limits   CULTURE, BLOOD 1   CULTURE, BLOOD 1   MAGNESIUM   LACTIC ACID        XR CHEST PORTABLE   Final Result   No evidence of an acute intrathoracic process. Voice dictation software was used during the making of this note. This software is not perfect and grammatical and other typographical errors may be present. This note has not been completely proofread for errors.      Diane Saldivar MD  06/23/22 [id number]

## 2022-06-24 NOTE — ED NOTES
Arrives via ems from [facility]. [facility] staff called per pt request. Pt has had cough for x2 weeks. covid was negative. Pt has been on ABx, pt always on 4L NC for COPD.  Pt was also evaluated by facility MD in the AM.  VSS [place] [id number], RN  06/23/22 2115       Myrtle Gruber RN  06/23/22 2116

## 2022-06-28 LAB
BACTERIA SPEC CULT: NORMAL
BACTERIA SPEC CULT: NORMAL
SERVICE CMNT-IMP: NORMAL
SERVICE CMNT-IMP: NORMAL

## 2022-08-05 LAB
ERYTHROCYTE [DISTWIDTH] IN BLOOD BY AUTOMATED COUNT: 14.6 % (ref 11.9–14.6)
HCT VFR BLD AUTO: 32.3 % (ref 41.1–50.3)
HGB BLD-MCNC: 10.3 G/DL (ref 13.6–17.2)
MCH RBC QN AUTO: 25.8 PG (ref 26.1–32.9)
MCHC RBC AUTO-ENTMCNC: 31.9 G/DL (ref 31.4–35)
MCV RBC AUTO: 81 FL (ref 79.6–97.8)
NRBC # BLD: 0 K/UL (ref 0–0.2)
PLATELET # BLD AUTO: 129 K/UL (ref 150–450)
PMV BLD AUTO: 11.1 FL (ref 9.4–12.3)
RBC # BLD AUTO: 3.99 M/UL (ref 4.23–5.6)
WBC # BLD AUTO: 9.3 K/UL (ref 4.3–11.1)

## 2023-01-18 ENCOUNTER — HOSPITAL ENCOUNTER (EMERGENCY)
Age: 43
Discharge: HOME | End: 2023-01-18
Attending: EMERGENCY MEDICINE
Payer: MEDICARE

## 2023-01-18 ENCOUNTER — APPOINTMENT (OUTPATIENT)
Dept: GENERAL RADIOLOGY | Age: 43
End: 2023-01-18
Payer: MEDICARE

## 2023-01-18 VITALS
DIASTOLIC BLOOD PRESSURE: 81 MMHG | SYSTOLIC BLOOD PRESSURE: 138 MMHG | HEIGHT: 69 IN | OXYGEN SATURATION: 100 % | WEIGHT: 238 LBS | TEMPERATURE: 98.5 F | BODY MASS INDEX: 35.25 KG/M2 | HEART RATE: 94 BPM | RESPIRATION RATE: 16 BRPM

## 2023-01-18 DIAGNOSIS — S91.105A OPEN WOUND OF FIFTH TOE OF LEFT FOOT, INITIAL ENCOUNTER: Primary | ICD-10-CM

## 2023-01-18 DIAGNOSIS — L89.159 PRESSURE INJURY OF SKIN OF SACRAL REGION, UNSPECIFIED INJURY STAGE: ICD-10-CM

## 2023-01-18 DIAGNOSIS — Q05.9 SPINA BIFIDA WITHOUT HYDROCEPHALUS, UNSPECIFIED SPINAL REGION (HCC): ICD-10-CM

## 2023-01-18 LAB
ALBUMIN SERPL-MCNC: 2.6 G/DL (ref 3.5–5)
ALBUMIN/GLOB SERPL: 0.4 (ref 0.4–1.6)
ALP SERPL-CCNC: 33 U/L (ref 50–136)
ALT SERPL-CCNC: 14 U/L (ref 12–65)
ANION GAP SERPL CALC-SCNC: 7 MMOL/L (ref 2–11)
AST SERPL-CCNC: 13 U/L (ref 15–37)
BASOPHILS # BLD: 0 K/UL (ref 0–0.2)
BASOPHILS NFR BLD: 1 % (ref 0–2)
BILIRUB SERPL-MCNC: 0.2 MG/DL (ref 0.2–1.1)
BUN SERPL-MCNC: 14 MG/DL (ref 6–23)
CALCIUM SERPL-MCNC: 10.4 MG/DL (ref 8.3–10.4)
CHLORIDE SERPL-SCNC: 95 MMOL/L (ref 101–110)
CO2 SERPL-SCNC: 28 MMOL/L (ref 21–32)
CREAT SERPL-MCNC: 0.9 MG/DL (ref 0.8–1.5)
CRP SERPL-MCNC: 1.3 MG/DL (ref 0–0.9)
DIFFERENTIAL METHOD BLD: ABNORMAL
EOSINOPHIL # BLD: 0.1 K/UL (ref 0–0.8)
EOSINOPHIL NFR BLD: 2 % (ref 0.5–7.8)
ERYTHROCYTE [DISTWIDTH] IN BLOOD BY AUTOMATED COUNT: 15.4 % (ref 11.9–14.6)
ERYTHROCYTE [SEDIMENTATION RATE] IN BLOOD: 92 MM/HR (ref 0–20)
GLOBULIN SER CALC-MCNC: 6 G/DL (ref 2.8–4.5)
GLUCOSE SERPL-MCNC: 188 MG/DL (ref 65–100)
HCT VFR BLD AUTO: 32.4 % (ref 41.1–50.3)
HGB BLD-MCNC: 10 G/DL (ref 13.6–17.2)
IMM GRANULOCYTES # BLD AUTO: 0.3 K/UL (ref 0–0.5)
IMM GRANULOCYTES NFR BLD AUTO: 4 % (ref 0–5)
LACTATE SERPL-SCNC: 1.4 MMOL/L (ref 0.4–2)
LYMPHOCYTES # BLD: 2.2 K/UL (ref 0.5–4.6)
LYMPHOCYTES NFR BLD: 35 % (ref 13–44)
MCH RBC QN AUTO: 25.5 PG (ref 26.1–32.9)
MCHC RBC AUTO-ENTMCNC: 30.9 G/DL (ref 31.4–35)
MCV RBC AUTO: 82.7 FL (ref 82–102)
MONOCYTES # BLD: 0.6 K/UL (ref 0.1–1.3)
MONOCYTES NFR BLD: 9 % (ref 4–12)
NEUTS SEG # BLD: 3.2 K/UL (ref 1.7–8.2)
NEUTS SEG NFR BLD: 49 % (ref 43–78)
NRBC # BLD: 0 K/UL (ref 0–0.2)
PLATELET # BLD AUTO: 357 K/UL (ref 150–450)
PMV BLD AUTO: 9 FL (ref 9.4–12.3)
POTASSIUM SERPL-SCNC: 4.7 MMOL/L (ref 3.5–5.1)
PROCALCITONIN SERPL-MCNC: 0.07 NG/ML (ref 0–0.49)
PROT SERPL-MCNC: 8.6 G/DL (ref 6.3–8.2)
RBC # BLD AUTO: 3.92 M/UL (ref 4.23–5.6)
SODIUM SERPL-SCNC: 130 MMOL/L (ref 133–143)
WBC # BLD AUTO: 6.3 K/UL (ref 4.3–11.1)

## 2023-01-18 PROCEDURE — 83605 ASSAY OF LACTIC ACID: CPT

## 2023-01-18 PROCEDURE — 99284 EMERGENCY DEPT VISIT MOD MDM: CPT

## 2023-01-18 PROCEDURE — 73630 X-RAY EXAM OF FOOT: CPT

## 2023-01-18 PROCEDURE — 84145 PROCALCITONIN (PCT): CPT

## 2023-01-18 PROCEDURE — 96361 HYDRATE IV INFUSION ADD-ON: CPT

## 2023-01-18 PROCEDURE — 96374 THER/PROPH/DIAG INJ IV PUSH: CPT

## 2023-01-18 PROCEDURE — 86140 C-REACTIVE PROTEIN: CPT

## 2023-01-18 PROCEDURE — 85652 RBC SED RATE AUTOMATED: CPT

## 2023-01-18 PROCEDURE — 2580000003 HC RX 258: Performed by: EMERGENCY MEDICINE

## 2023-01-18 PROCEDURE — 85025 COMPLETE CBC W/AUTO DIFF WBC: CPT

## 2023-01-18 PROCEDURE — 6360000002 HC RX W HCPCS: Performed by: EMERGENCY MEDICINE

## 2023-01-18 PROCEDURE — 96375 TX/PRO/DX INJ NEW DRUG ADDON: CPT

## 2023-01-18 PROCEDURE — 80053 COMPREHEN METABOLIC PANEL: CPT

## 2023-01-18 PROCEDURE — 71045 X-RAY EXAM CHEST 1 VIEW: CPT

## 2023-01-18 RX ORDER — CEPHALEXIN 500 MG/1
1000 CAPSULE ORAL 2 TIMES DAILY
Qty: 28 CAPSULE | Refills: 0 | Status: SHIPPED | OUTPATIENT
Start: 2023-01-18 | End: 2023-01-18 | Stop reason: SDUPTHER

## 2023-01-18 RX ORDER — KETOROLAC TROMETHAMINE 15 MG/ML
15 INJECTION, SOLUTION INTRAMUSCULAR; INTRAVENOUS ONCE
Status: COMPLETED | OUTPATIENT
Start: 2023-01-18 | End: 2023-01-18

## 2023-01-18 RX ORDER — MORPHINE SULFATE 4 MG/ML
4 INJECTION, SOLUTION INTRAMUSCULAR; INTRAVENOUS
Status: COMPLETED | OUTPATIENT
Start: 2023-01-18 | End: 2023-01-18

## 2023-01-18 RX ORDER — CEPHALEXIN 500 MG/1
1000 CAPSULE ORAL 2 TIMES DAILY
Qty: 28 CAPSULE | Refills: 0 | Status: SHIPPED | OUTPATIENT
Start: 2023-01-18 | End: 2023-01-25

## 2023-01-18 RX ORDER — SODIUM CHLORIDE, SODIUM LACTATE, POTASSIUM CHLORIDE, AND CALCIUM CHLORIDE .6; .31; .03; .02 G/100ML; G/100ML; G/100ML; G/100ML
1000 INJECTION, SOLUTION INTRAVENOUS ONCE
Status: COMPLETED | OUTPATIENT
Start: 2023-01-18 | End: 2023-01-18

## 2023-01-18 RX ADMIN — KETOROLAC TROMETHAMINE 15 MG: 15 INJECTION, SOLUTION INTRAMUSCULAR; INTRAVENOUS at 13:12

## 2023-01-18 RX ADMIN — MORPHINE SULFATE 4 MG: 4 INJECTION INTRAVENOUS at 13:12

## 2023-01-18 RX ADMIN — SODIUM CHLORIDE, POTASSIUM CHLORIDE, SODIUM LACTATE AND CALCIUM CHLORIDE 1000 ML: 600; 310; 30; 20 INJECTION, SOLUTION INTRAVENOUS at 13:12

## 2023-01-18 ASSESSMENT — PAIN DESCRIPTION - LOCATION: LOCATION: HEAD

## 2023-01-18 ASSESSMENT — PAIN DESCRIPTION - DESCRIPTORS: DESCRIPTORS: ACHING

## 2023-01-18 ASSESSMENT — PAIN SCALES - GENERAL: PAINLEVEL_OUTOF10: 10

## 2023-01-18 NOTE — ED TRIAGE NOTES
Patient comes via ems from [facility] facility room 224    Patient co ulcer to sacral and right leg. Patient reports he is on antibiotic for erythema to right lower leg.

## 2023-01-18 NOTE — ED NOTES
[address] [place] [address] called to transport patient back to facility; ETA 1900.      Levon Lane  01/18/23 151

## 2023-01-18 NOTE — DISCHARGE INSTRUCTIONS
Please take antibiotics as prescribed. Please keep a close eye on your sacral wounds as well as your diabetic foot wound. Please return for any fevers or any worsening symptoms.

## 2023-01-18 NOTE — ED PROVIDER NOTES
Emergency Department Provider Note                   PCP:                None Provider               Age: 42 y.o.      Sex: male       ICD-10-CM    1. Open wound of fifth toe of left foot, initial encounter  S91.105A       2. Pressure injury of skin of sacral region, unspecified injury stage  L89.159       3. Spina bifida without hydrocephalus, unspecified spinal region ([facility])  Q05.9           DISPOSITION Decision To Discharge 01/18/2023 02:50:07 PM       Medical Decision Making  42-year-old male presents to the emergency department via EMS from [facility] for concerns for potential sepsis.  Patient has a left diabetic foot wound and some sacral ulcers.  Patient has a prior history of spina bifida which is left him bedbound.  Patient reports that he has been septic multiple times in the past.  He states that he feels completely normal now and thinks that they should have just placed him on oral antibiotics.  On arrival here into the emergency department he was tachycardic but not febrile or tachypneic.  He does have a diabetic foot wound to his left lateral foot and a stage I sacral ulcer on the left buttock region.  We will go ahead and initiate a septic work-up here.  Patient is not hypotensive so I will withhold the initial full 30 cc fluid bolus.  He was given 1 L of fluids.  Patient's main concern here is he states he has not had his daily pain medication he reports being on chronic morphine did not receive his medicines this morning.  He endorses a slight headache.  No neurologic deficits.  CBC here showed no elevation white blood cell count hemoglobin 10.0, platelets 357  CMP is unremarkable  Lactic acid 1.4, procalcitonin less than 0.07  Does have an elevated ESR and CRP 92 and 1.3 respectively  Chest x-ray interpreted by myself and radiologist showed no acute abnormality hard to fully evaluate with low lung volumes and body habitus  X-ray of the foot shows soft tissue swelling with no  acute osteoarticular process.  This point I did offer the patient admission to the hospital due to his comorbidities of immobility sacral wound diabetic wound and elevated inflammatory markers.  Patient states that he prefer to go back to the nursing facility I think that is reasonable as he will have continued care there.  I written for p.o. antibiotics for home.  Patient was given return precautions.  Patient stable discharge examination    Amount and/or Complexity of Data Reviewed  Labs: ordered.  Radiology: ordered.  ECG/medicine tests: ordered.    Risk  Prescription drug management.       Complexity of Problem:1 acute or chronic illness that poses a threat to life or bodily function. (5)  The patients assessment required an independent historian: I spoke with the paramedic.  The patients assessment required an independent historian: I spoke with facility staff.  I have conducted an independent ordering and review of Labs.  I have conducted an independent ordering and review of EKG.  I have conducted an independent ordering and review of X-rays.  I have reviewed records from an external source: provider visit notes from PCP.  I have reviewed records from an external source: provider visit notes from outside specialist.  Considerations: Shared decision making was utilized in the care of this patient.   Social determinant affecting care: none  Evidence based risk calculation performed: none  I discussed case with nurse and patient about care  Dispo back to nursing home with follow up for wound care.          Orders Placed This Encounter   Procedures    XR CHEST PORTABLE    XR FOOT LEFT (MIN 3 VIEWS)    CBC with Auto Differential    CMP    Lactate, Sepsis    Procalcitonin    Sedimentation Rate    C-Reactive Protein    Cardiac Monitor - ED Only        Medications   lactated ringers bolus (1,000 mLs IntraVENous New Bag 1/18/23 1312)   ketorolac (TORADOL) injection 15 mg (15 mg IntraVENous Given 1/18/23 1312)  morphine injection 4 mg (4 mg IntraVENous Given 1/18/23 1312)       New Prescriptions    CEPHALEXIN (KEFLEX) 500 MG CAPSULE    Take 2 capsules by mouth 2 times daily for 7 days        Dyan Nolasco is a 36 y.o. male who presents to the Emergency Department with chief complaint of    Chief Complaint   Patient presents with    Skin Ulcer      59-year-old male presents to the emergency department via EMS from [facility] for concerns for potential sepsis. Patient has a left diabetic foot wound and some sacral ulcers. Patient has a prior history of spina bifida which is left him bedbound. Patient reports that he has been septic multiple times in the past.  He states that he feels completely normal now and thinks that they should have just placed him on oral antibiotics. Review of Systems    Past Medical History:   Diagnosis Date    Depression     GERD (gastroesophageal reflux disease)     Heart disease     Hypertension     Neurogenic bladder, NOS     Spina bifida ([address]     Spina bifida without mention of hydrocephalus, unspecified region     Stress incontinence, male     Toxic effect of latex(989.82)     Urinary tract infection, site not specified         Past Surgical History:   Procedure Laterality Date    ORTHOPEDIC SURGERY Right     foot surgery    UROLOGICAL SURGERY      artificial urinary sphincter        No family history on file.      Social History     Socioeconomic History    Marital status: Single   Tobacco Use    Smoking status: Former    Smokeless tobacco: Former   Substance and Sexual Activity    Alcohol use: No        Allergies: Latex    Previous Medications    ALBUTEROL SULFATE HFA (PROAIR HFA) 108 (90 BASE) MCG/ACT INHALER    Inhale 2 puffs into the lungs every 6 hours as needed for Wheezing    ASPIRIN 325 MG EC TABLET    Take 325 mg by mouth daily    BISACODYL (DULCOLAX) 5 MG EC TABLET    Take 5 mg by mouth nightly as needed for Constipation Hold for constipation BUSPIRONE (BUSPAR) 15 MG TABLET    Take 15 mg by mouth 3 times daily    CARBOXYMETHYLCELLULOSE PF (LUBRICANT EYE DROPS PF) 0.5 % SOLN OPHTHALMIC SOLUTION    Place 1 drop into both eyes 2 times daily    CETIRIZINE (ZYRTEC) 10 MG TABLET    Take 10 mg by mouth daily For post nasal drainage    CLONAZEPAM (KLONOPIN) 0.5 MG TABLET    Take 0.5 mg by mouth 2 times daily. DICYCLOMINE (BENTYL) 20 MG TABLET    Take 20 mg by mouth 3 times daily    DOCUSATE SODIUM (COLACE) 100 MG CAPSULE    Take 100 mg by mouth every 12 hours Hold for loose stools    ESCITALOPRAM (LEXAPRO) 20 MG TABLET    Take 15 mg by mouth daily    EZETIMIBE (ZETIA) 10 MG TABLET    Take 10 mg by mouth daily    FAMOTIDINE (PEPCID) 40 MG TABLET    Take 40 mg by mouth every 12 hours    FENOFIBRATE (TRIGLIDE) 160 MG TABLET    Take 160 mg by mouth daily Take with meal. Preferably with largest meal of the day    FERROUS GLUCONATE (FERGON) 324 MG (38 MG IRON) TABLET    Take 324 mg by mouth daily    FLUTICASONE (FLONASE) 50 MCG/ACT NASAL SPRAY    2 sprays by Each Nostril route daily    GABAPENTIN (NEURONTIN) 300 MG CAPSULE    Take 600 mg by mouth 3 times daily.     GUAIFENESIN (ROBITUSSIN) 100 MG/5ML SYRUP    Take 200 mg by mouth 4 times daily as needed for Cough    HYDROXYZINE (ATARAX) 50 MG TABLET    Take 50 mg by mouth every 4 hours as needed for Anxiety    INSULIN ASPART (NOVOLOG FLEXPEN) 100 UNIT/ML INJECTION PEN    Inject into the skin 3 times daily (before meals) 8U Subcutaneously    INSULIN GLARGINE (LANTUS SOLOSTAR) 100 UNIT/ML INJECTION PEN    Inject 35 Units into the skin daily Subcutaneously    LISINOPRIL (PRINIVIL;ZESTRIL) 10 MG TABLET    Take 10 mg by mouth daily    METFORMIN (GLUCOPHAGE) 500 MG TABLET    Take 500 mg by mouth every 12 hours    METHENAMINE (HIPREX) 1 G TABLET    Take 1 g by mouth every 12 hours    METOPROLOL TARTRATE (LOPRESSOR) 50 MG TABLET    Take 50 mg by mouth 2 times daily    NALOXONE 4 MG/0.1ML LIQD NASAL SPRAY    1 spray by Nasal route as needed for Opioid Reversal    OXYBUTYNIN (DITROPAN) 5 MG TABLET    Take 5 mg by mouth 3 times daily    POLYETHYLENE GLYCOL (GLYCOLAX) 17 G PACKET    Take 17 g by mouth 2 times daily Hold for loose stools    QUETIAPINE (SEROQUEL) 300 MG TABLET    Take 300 mg by mouth daily    RISPERIDONE (RISPERDAL) 1 MG TABLET    Take 1 mg by mouth every 12 hours    SIMVASTATIN (ZOCOR) 40 MG TABLET    Take 40 mg by mouth daily    SODIUM CHLORIDE (OCEAN, BABY AYR) 0.65 % NASAL SPRAY    1 spray by Nasal route 3 times daily as needed for Congestion Both nostrils for allergic rhinitis        Vitals signs and nursing note reviewed. Patient Vitals for the past 4 hrs:   Temp Pulse Resp BP SpO2   01/18/23 1346 -- 97 13 122/70 96 %   01/18/23 1315 -- 97 16 (!) 147/78 99 %   01/18/23 1300 -- 98 15 119/71 98 %   01/18/23 1200 98.3 °F (36.8 °C) (!) 110 22 138/74 97 %          Physical Exam     Procedures    Results for orders placed or performed during the hospital encounter of 01/18/23   XR CHEST PORTABLE    Narrative    PORTABLE CHEST 1 VIEW    HISTORY: Shortness of breath and sepsis    COMPARISON: 6/23/2022    FINDINGS: There is no consolidation, pleural effusions, or pulmonary edema. Lung volumes are diminished. EKG leads are present. Impression    Low lung volumes. XR FOOT LEFT (MIN 3 VIEWS)    Narrative    Left foot radiographs    INDICATION: septic, diabetic foot wound to left lateral foot base of pinkie toe    COMPARISON: None. TECHNIQUE: 3 views of the left foot were obtained. FINDINGS:  Diffuse soft tissue swelling. No fracture or malalignment. Impression    Soft tissue swelling. No acute osteoarticular process.    CBC with Auto Differential   Result Value Ref Range    WBC 6.3 4.3 - 11.1 K/uL    RBC 3.92 (L) 4.23 - 5.6 M/uL    Hemoglobin 10.0 (L) 13.6 - 17.2 g/dL    Hematocrit 32.4 (L) 41.1 - 50.3 %    MCV 82.7 82 - 102 FL    MCH 25.5 (L) 26.1 - 32.9 PG    MCHC 30.9 (L) 31.4 - 35.0 g/dL    RDW 15.4 (H) 11.9 - 14.6 %    Platelets 539 447 - 986 K/uL    MPV 9.0 (L) 9.4 - 12.3 FL    nRBC 0.00 0.0 - 0.2 K/uL    Differential Type AUTOMATED      Seg Neutrophils 49 43 - 78 %    Lymphocytes 35 13 - 44 %    Monocytes 9 4.0 - 12.0 %    Eosinophils % 2 0.5 - 7.8 %    Basophils 1 0.0 - 2.0 %    Immature Granulocytes 4 0.0 - 5.0 %    Segs Absolute 3.2 1.7 - 8.2 K/UL    Absolute Lymph # 2.2 0.5 - 4.6 K/UL    Absolute Mono # 0.6 0.1 - 1.3 K/UL    Absolute Eos # 0.1 0.0 - 0.8 K/UL    Basophils Absolute 0.0 0.0 - 0.2 K/UL    Absolute Immature Granulocyte 0.3 0.0 - 0.5 K/UL   CMP   Result Value Ref Range    Sodium 130 (L) 133 - 143 mmol/L    Potassium 4.7 3.5 - 5.1 mmol/L    Chloride 95 (L) 101 - 110 mmol/L    CO2 28 21 - 32 mmol/L    Anion Gap 7 2 - 11 mmol/L    Glucose 188 (H) 65 - 100 mg/dL    BUN 14 6 - 23 MG/DL    Creatinine 0.90 0.8 - 1.5 MG/DL    Est, Glom Filt Rate >60 >60 ml/min/1.73m2    Calcium 10.4 8.3 - 10.4 MG/DL    Total Bilirubin 0.2 0.2 - 1.1 MG/DL    ALT 14 12 - 65 U/L    AST 13 (L) 15 - 37 U/L    Alk Phosphatase 33 (L) 50 - 136 U/L    Total Protein 8.6 (H) 6.3 - 8.2 g/dL    Albumin 2.6 (L) 3.5 - 5.0 g/dL    Globulin 6.0 (H) 2.8 - 4.5 g/dL    Albumin/Globulin Ratio 0.4 0.4 - 1.6     Lactate, Sepsis   Result Value Ref Range    Lactic Acid, Sepsis 1.4 0.4 - 2.0 MMOL/L   Procalcitonin   Result Value Ref Range    Procalcitonin 0.07 0.00 - 0.49 ng/mL   Sedimentation Rate   Result Value Ref Range    Sed Rate, Automated 92 (H) 0 - 20 mm/hr   C-Reactive Protein   Result Value Ref Range    CRP 1.3 (H) 0.0 - 0.9 mg/dL        XR CHEST PORTABLE   Final Result   Low lung volumes. XR FOOT LEFT (MIN 3 VIEWS)   Final Result   Soft tissue swelling. No acute osteoarticular process. Voice dictation software was used during the making of this note. This software is not perfect and grammatical and other typographical errors may be present. This note has not been completely proofread for errors. Lauren Primrose, DO  01/18/23 1509

## 2023-01-18 NOTE — ED NOTES
I have reviewed discharge instructions with the patient. The patient verbalized understanding. Patient left ED via Discharge Method: stretcher to [facility] with transport    Opportunity for questions and clarification provided. Patient given 1 scripts. To continue your aftercare when you leave the hospital, you may receive an automated call from our care team to check in on how you are doing. This is a free service and part of our promise to provide the best care and service to meet your aftercare needs.  If you have questions, or wish to unsubscribe from this service please call [phone]. Thank you for Choosing our [address] [place] [address] Emergency Department.         Emy Russell RN  01/18/23 8978

## 2023-01-18 NOTE — ED NOTES
For information call Shannon from [facility] facility     [address] [place] [address]  01/18/23 6554

## 2024-07-10 ENCOUNTER — HOSPITAL ENCOUNTER (OUTPATIENT)
Age: 44
Setting detail: OBSERVATION
Discharge: NURSING, REHAB OR LONG-TERM CARE | End: 2024-07-12
Attending: EMERGENCY MEDICINE | Admitting: INTERNAL MEDICINE
Payer: MEDICARE

## 2024-07-10 ENCOUNTER — APPOINTMENT (OUTPATIENT)
Dept: CT IMAGING | Age: 44
End: 2024-07-10
Payer: MEDICARE

## 2024-07-10 ENCOUNTER — APPOINTMENT (OUTPATIENT)
Dept: GENERAL RADIOLOGY | Age: 44
End: 2024-07-10
Payer: MEDICARE

## 2024-07-10 DIAGNOSIS — K92.2 ACUTE GI BLEEDING: Primary | ICD-10-CM

## 2024-07-10 DIAGNOSIS — L89.159 PRESSURE INJURY OF SKIN OF SACRAL REGION, UNSPECIFIED INJURY STAGE: ICD-10-CM

## 2024-07-10 DIAGNOSIS — F39 MOOD DISORDER (HCC): ICD-10-CM

## 2024-07-10 DIAGNOSIS — D62 ANEMIA DUE TO ACUTE BLOOD LOSS: ICD-10-CM

## 2024-07-10 PROBLEM — E87.1 HYPONATREMIA: Status: ACTIVE | Noted: 2024-07-10

## 2024-07-10 LAB
ALBUMIN SERPL-MCNC: 2.2 G/DL (ref 3.5–5)
ALBUMIN/GLOB SERPL: 0.5 (ref 1–1.9)
ALP SERPL-CCNC: 41 U/L (ref 40–129)
ALT SERPL-CCNC: 7 U/L (ref 12–65)
ANION GAP SERPL CALC-SCNC: 8 MMOL/L (ref 9–18)
APPEARANCE UR: CLEAR
AST SERPL-CCNC: 15 U/L (ref 15–37)
BACTERIA URNS QL MICRO: 0 /HPF
BASOPHILS # BLD: 0.1 K/UL (ref 0–0.2)
BASOPHILS NFR BLD: 1 % (ref 0–2)
BILIRUB SERPL-MCNC: <0.2 MG/DL (ref 0–1.2)
BILIRUB UR QL: NEGATIVE
BUN SERPL-MCNC: 21 MG/DL (ref 6–23)
CALCIUM SERPL-MCNC: 9.2 MG/DL (ref 8.8–10.2)
CHLORIDE SERPL-SCNC: 92 MMOL/L (ref 98–107)
CO2 SERPL-SCNC: 27 MMOL/L (ref 20–28)
COLOR UR: ABNORMAL
CREAT SERPL-MCNC: 0.86 MG/DL (ref 0.8–1.3)
DIFFERENTIAL METHOD BLD: ABNORMAL
EKG ATRIAL RATE: 115 BPM
EKG DIAGNOSIS: NORMAL
EKG P AXIS: 54 DEGREES
EKG P-R INTERVAL: 170 MS
EKG Q-T INTERVAL: 318 MS
EKG QRS DURATION: 80 MS
EKG QTC CALCULATION (BAZETT): 440 MS
EKG R AXIS: 64 DEGREES
EKG T AXIS: 25 DEGREES
EKG VENTRICULAR RATE: 115 BPM
EOSINOPHIL # BLD: 0.2 K/UL (ref 0–0.8)
EOSINOPHIL NFR BLD: 2 % (ref 0.5–7.8)
ERYTHROCYTE [DISTWIDTH] IN BLOOD BY AUTOMATED COUNT: 18.2 % (ref 11.9–14.6)
GLOBULIN SER CALC-MCNC: 4.8 G/DL (ref 2.3–3.5)
GLUCOSE BLD STRIP.AUTO-MCNC: 212 MG/DL (ref 65–100)
GLUCOSE SERPL-MCNC: 262 MG/DL (ref 70–99)
GLUCOSE UR STRIP.AUTO-MCNC: 250 MG/DL
HCT VFR BLD AUTO: 24.2 % (ref 41.1–50.3)
HGB BLD-MCNC: 7.4 G/DL (ref 13.6–17.2)
HGB UR QL STRIP: NEGATIVE
IMM GRANULOCYTES # BLD AUTO: 0.2 K/UL (ref 0–0.5)
IMM GRANULOCYTES NFR BLD AUTO: 1 % (ref 0–5)
KETONES UR QL STRIP.AUTO: NEGATIVE MG/DL
LACTATE SERPL-SCNC: 1.4 MMOL/L (ref 0.5–2)
LACTATE SERPL-SCNC: 1.5 MMOL/L (ref 0.5–2)
LEUKOCYTE ESTERASE UR QL STRIP.AUTO: NEGATIVE
LYMPHOCYTES # BLD: 1.9 K/UL (ref 0.5–4.6)
LYMPHOCYTES NFR BLD: 18 % (ref 13–44)
MCH RBC QN AUTO: 24.3 PG (ref 26.1–32.9)
MCHC RBC AUTO-ENTMCNC: 30.6 G/DL (ref 31.4–35)
MCV RBC AUTO: 79.6 FL (ref 82–102)
MONOCYTES # BLD: 1 K/UL (ref 0.1–1.3)
MONOCYTES NFR BLD: 9 % (ref 4–12)
NEUTS SEG # BLD: 7.6 K/UL (ref 1.7–8.2)
NEUTS SEG NFR BLD: 69 % (ref 43–78)
NITRITE UR QL STRIP.AUTO: NEGATIVE
NRBC # BLD: 0 K/UL (ref 0–0.2)
PH UR STRIP: 7 (ref 5–9)
PLATELET # BLD AUTO: 358 K/UL (ref 150–450)
PMV BLD AUTO: 8.5 FL (ref 9.4–12.3)
POTASSIUM SERPL-SCNC: 5.2 MMOL/L (ref 3.5–5.1)
PROCALCITONIN SERPL-MCNC: 0.11 NG/ML (ref 0–0.1)
PROT SERPL-MCNC: 7.1 G/DL (ref 6.3–8.2)
PROT UR STRIP-MCNC: NEGATIVE MG/DL
RBC # BLD AUTO: 3.04 M/UL (ref 4.23–5.6)
SERVICE CMNT-IMP: ABNORMAL
SODIUM SERPL-SCNC: 127 MMOL/L (ref 136–145)
SP GR UR REFRACTOMETRY: 1.03 (ref 1–1.02)
UROBILINOGEN UR QL STRIP.AUTO: 1 EU/DL (ref 0.2–1)
WBC # BLD AUTO: 10.9 K/UL (ref 4.3–11.1)

## 2024-07-10 PROCEDURE — G0378 HOSPITAL OBSERVATION PER HR: HCPCS

## 2024-07-10 PROCEDURE — 85025 COMPLETE CBC W/AUTO DIFF WBC: CPT

## 2024-07-10 PROCEDURE — 74177 CT ABD & PELVIS W/CONTRAST: CPT

## 2024-07-10 PROCEDURE — 2700000000 HC OXYGEN THERAPY PER DAY

## 2024-07-10 PROCEDURE — 6370000000 HC RX 637 (ALT 250 FOR IP): Performed by: NURSE PRACTITIONER

## 2024-07-10 PROCEDURE — 6360000004 HC RX CONTRAST MEDICATION: Performed by: EMERGENCY MEDICINE

## 2024-07-10 PROCEDURE — 86900 BLOOD TYPING SEROLOGIC ABO: CPT

## 2024-07-10 PROCEDURE — 99285 EMERGENCY DEPT VISIT HI MDM: CPT

## 2024-07-10 PROCEDURE — 86901 BLOOD TYPING SEROLOGIC RH(D): CPT

## 2024-07-10 PROCEDURE — 94760 N-INVAS EAR/PLS OXIMETRY 1: CPT

## 2024-07-10 PROCEDURE — 36415 COLL VENOUS BLD VENIPUNCTURE: CPT

## 2024-07-10 PROCEDURE — 87040 BLOOD CULTURE FOR BACTERIA: CPT

## 2024-07-10 PROCEDURE — 2580000003 HC RX 258: Performed by: EMERGENCY MEDICINE

## 2024-07-10 PROCEDURE — 86923 COMPATIBILITY TEST ELECTRIC: CPT

## 2024-07-10 PROCEDURE — 6370000000 HC RX 637 (ALT 250 FOR IP): Performed by: INTERNAL MEDICINE

## 2024-07-10 PROCEDURE — 80053 COMPREHEN METABOLIC PANEL: CPT

## 2024-07-10 PROCEDURE — 93005 ELECTROCARDIOGRAM TRACING: CPT | Performed by: EMERGENCY MEDICINE

## 2024-07-10 PROCEDURE — 86850 RBC ANTIBODY SCREEN: CPT

## 2024-07-10 PROCEDURE — 93010 ELECTROCARDIOGRAM REPORT: CPT | Performed by: INTERNAL MEDICINE

## 2024-07-10 PROCEDURE — 82962 GLUCOSE BLOOD TEST: CPT

## 2024-07-10 PROCEDURE — 83605 ASSAY OF LACTIC ACID: CPT

## 2024-07-10 PROCEDURE — 84145 PROCALCITONIN (PCT): CPT

## 2024-07-10 PROCEDURE — 71046 X-RAY EXAM CHEST 2 VIEWS: CPT

## 2024-07-10 PROCEDURE — 81003 URINALYSIS AUTO W/O SCOPE: CPT

## 2024-07-10 RX ORDER — 0.9 % SODIUM CHLORIDE 0.9 %
1000 INTRAVENOUS SOLUTION INTRAVENOUS ONCE
Status: COMPLETED | OUTPATIENT
Start: 2024-07-10 | End: 2024-07-10

## 2024-07-10 RX ORDER — OXYCODONE HYDROCHLORIDE 5 MG/1
5 TABLET ORAL EVERY 8 HOURS PRN
Status: ON HOLD | COMMUNITY
End: 2024-07-12

## 2024-07-10 RX ORDER — MAGNESIUM HYDROXIDE/ALUMINUM HYDROXICE/SIMETHICONE 120; 1200; 1200 MG/30ML; MG/30ML; MG/30ML
20 SUSPENSION ORAL EVERY 6 HOURS PRN
COMMUNITY

## 2024-07-10 RX ORDER — ATORVASTATIN CALCIUM 80 MG/1
80 TABLET, FILM COATED ORAL DAILY
Status: DISCONTINUED | OUTPATIENT
Start: 2024-07-11 | End: 2024-07-12 | Stop reason: HOSPADM

## 2024-07-10 RX ORDER — BUDESONIDE AND FORMOTEROL FUMARATE DIHYDRATE 160; 4.5 UG/1; UG/1
2 AEROSOL RESPIRATORY (INHALATION) 2 TIMES DAILY
COMMUNITY

## 2024-07-10 RX ORDER — CLONAZEPAM 0.5 MG/1
0.5 TABLET ORAL 2 TIMES DAILY
Status: DISCONTINUED | OUTPATIENT
Start: 2024-07-10 | End: 2024-07-12 | Stop reason: HOSPADM

## 2024-07-10 RX ORDER — GABAPENTIN 300 MG/1
600 CAPSULE ORAL 3 TIMES DAILY
Status: DISCONTINUED | OUTPATIENT
Start: 2024-07-10 | End: 2024-07-12 | Stop reason: HOSPADM

## 2024-07-10 RX ORDER — SODIUM CHLORIDE 1 G/1
1 TABLET ORAL 3 TIMES DAILY
COMMUNITY

## 2024-07-10 RX ORDER — INSULIN GLARGINE 100 [IU]/ML
35 INJECTION, SOLUTION SUBCUTANEOUS DAILY
Status: DISCONTINUED | OUTPATIENT
Start: 2024-07-11 | End: 2024-07-12 | Stop reason: HOSPADM

## 2024-07-10 RX ORDER — QUETIAPINE FUMARATE 100 MG/1
300 TABLET, FILM COATED ORAL DAILY
Status: DISCONTINUED | OUTPATIENT
Start: 2024-07-11 | End: 2024-07-11

## 2024-07-10 RX ORDER — CETIRIZINE HYDROCHLORIDE 10 MG/1
10 TABLET ORAL DAILY
Status: DISCONTINUED | OUTPATIENT
Start: 2024-07-11 | End: 2024-07-12 | Stop reason: HOSPADM

## 2024-07-10 RX ORDER — ALBUTEROL SULFATE 2.5 MG/3ML
2.5 SOLUTION RESPIRATORY (INHALATION) EVERY 4 HOURS PRN
Status: DISCONTINUED | OUTPATIENT
Start: 2024-07-10 | End: 2024-07-12 | Stop reason: HOSPADM

## 2024-07-10 RX ORDER — DICYCLOMINE HCL 20 MG
20 TABLET ORAL 3 TIMES DAILY
Status: DISCONTINUED | OUTPATIENT
Start: 2024-07-10 | End: 2024-07-12 | Stop reason: HOSPADM

## 2024-07-10 RX ORDER — SODIUM HYPOCHLORITE 1.25 MG/ML
250 SOLUTION TOPICAL DAILY
COMMUNITY

## 2024-07-10 RX ORDER — AMINO ACIDS/PROTEIN HYDROLYS 11-80/30ML
15 LIQUID (ML) ORAL 2 TIMES DAILY
COMMUNITY

## 2024-07-10 RX ORDER — HYDROXYZINE HYDROCHLORIDE 25 MG/1
50 TABLET, FILM COATED ORAL EVERY 4 HOURS PRN
Status: DISCONTINUED | OUTPATIENT
Start: 2024-07-10 | End: 2024-07-12 | Stop reason: HOSPADM

## 2024-07-10 RX ORDER — ESCITALOPRAM OXALATE 10 MG/1
15 TABLET ORAL DAILY
Status: DISCONTINUED | OUTPATIENT
Start: 2024-07-11 | End: 2024-07-12 | Stop reason: HOSPADM

## 2024-07-10 RX ORDER — ERGOCALCIFEROL 1.25 MG/1
50000 CAPSULE ORAL WEEKLY
COMMUNITY

## 2024-07-10 RX ORDER — NUTRITIONAL SUPPLEMENT
1 POWDER (GRAM) ORAL 2 TIMES DAILY
COMMUNITY

## 2024-07-10 RX ORDER — DEXTROSE MONOHYDRATE 100 MG/ML
INJECTION, SOLUTION INTRAVENOUS CONTINUOUS PRN
Status: DISCONTINUED | OUTPATIENT
Start: 2024-07-10 | End: 2024-07-12 | Stop reason: HOSPADM

## 2024-07-10 RX ORDER — IBUPROFEN 600 MG/1
1 TABLET ORAL PRN
Status: DISCONTINUED | OUTPATIENT
Start: 2024-07-10 | End: 2024-07-12 | Stop reason: HOSPADM

## 2024-07-10 RX ORDER — ICOSAPENT ETHYL 1 G/1
2 CAPSULE ORAL 2 TIMES DAILY
COMMUNITY

## 2024-07-10 RX ORDER — NITROGLYCERIN 0.4 MG/1
0.4 TABLET SUBLINGUAL EVERY 5 MIN PRN
COMMUNITY

## 2024-07-10 RX ORDER — FENOFIBRATE 160 MG/1
160 TABLET ORAL DAILY
Status: DISCONTINUED | OUTPATIENT
Start: 2024-07-11 | End: 2024-07-12 | Stop reason: HOSPADM

## 2024-07-10 RX ORDER — OXYCODONE HYDROCHLORIDE 5 MG/1
5 TABLET ORAL EVERY 8 HOURS PRN
Status: DISCONTINUED | OUTPATIENT
Start: 2024-07-10 | End: 2024-07-12

## 2024-07-10 RX ORDER — CALCIUM CARBONATE 500 MG/1
1 TABLET, CHEWABLE ORAL EVERY 6 HOURS PRN
COMMUNITY

## 2024-07-10 RX ORDER — ALBUTEROL SULFATE 90 UG/1
2 AEROSOL, METERED RESPIRATORY (INHALATION) EVERY 4 HOURS PRN
Status: DISCONTINUED | OUTPATIENT
Start: 2024-07-10 | End: 2024-07-10

## 2024-07-10 RX ORDER — BISACODYL 5 MG/1
5 TABLET, DELAYED RELEASE ORAL
Status: DISCONTINUED | OUTPATIENT
Start: 2024-07-10 | End: 2024-07-12 | Stop reason: HOSPADM

## 2024-07-10 RX ORDER — ATORVASTATIN CALCIUM 80 MG/1
80 TABLET, FILM COATED ORAL DAILY
COMMUNITY

## 2024-07-10 RX ORDER — CALCIUM CARBONATE 500 MG/1
500 TABLET, CHEWABLE ORAL 3 TIMES DAILY PRN
Status: DISCONTINUED | OUTPATIENT
Start: 2024-07-10 | End: 2024-07-12 | Stop reason: HOSPADM

## 2024-07-10 RX ORDER — FERROUS GLUCONATE 324(38)MG
324 TABLET ORAL DAILY
Status: DISCONTINUED | OUTPATIENT
Start: 2024-07-11 | End: 2024-07-12 | Stop reason: HOSPADM

## 2024-07-10 RX ORDER — FAMOTIDINE 20 MG/1
20 TABLET, FILM COATED ORAL EVERY 12 HOURS
Status: DISCONTINUED | OUTPATIENT
Start: 2024-07-10 | End: 2024-07-12 | Stop reason: HOSPADM

## 2024-07-10 RX ORDER — EZETIMIBE 10 MG/1
10 TABLET ORAL DAILY
Status: DISCONTINUED | OUTPATIENT
Start: 2024-07-11 | End: 2024-07-12 | Stop reason: HOSPADM

## 2024-07-10 RX ORDER — DULAGLUTIDE 4.5 MG/.5ML
4.5 INJECTION, SOLUTION SUBCUTANEOUS WEEKLY
COMMUNITY

## 2024-07-10 RX ORDER — LACTOBACILLUS RHAMNOSUS GG 10B CELL
1 CAPSULE ORAL DAILY
COMMUNITY

## 2024-07-10 RX ORDER — CITALOPRAM 20 MG/1
20 TABLET ORAL DAILY
COMMUNITY

## 2024-07-10 RX ORDER — CEFTRIAXONE 1 G/1
1000 INJECTION, POWDER, FOR SOLUTION INTRAMUSCULAR; INTRAVENOUS EVERY 24 HOURS
Status: ON HOLD | COMMUNITY
Start: 2024-07-09 | End: 2024-07-12 | Stop reason: HOSPADM

## 2024-07-10 RX ORDER — METOPROLOL TARTRATE 50 MG/1
50 TABLET, FILM COATED ORAL 2 TIMES DAILY
Status: DISCONTINUED | OUTPATIENT
Start: 2024-07-10 | End: 2024-07-12 | Stop reason: HOSPADM

## 2024-07-10 RX ORDER — DIVALPROEX SODIUM 500 MG/1
500 TABLET, EXTENDED RELEASE ORAL 2 TIMES DAILY
COMMUNITY

## 2024-07-10 RX ORDER — M-VIT,TX,IRON,MINS/CALC/FOLIC 27MG-0.4MG
1 TABLET ORAL DAILY
COMMUNITY

## 2024-07-10 RX ORDER — DOCUSATE SODIUM 100 MG/1
100 CAPSULE, LIQUID FILLED ORAL EVERY 12 HOURS
Status: DISCONTINUED | OUTPATIENT
Start: 2024-07-10 | End: 2024-07-12 | Stop reason: HOSPADM

## 2024-07-10 RX ORDER — ONDANSETRON 4 MG/1
4 TABLET, FILM COATED ORAL EVERY 8 HOURS PRN
COMMUNITY

## 2024-07-10 RX ORDER — LISINOPRIL 5 MG/1
10 TABLET ORAL DAILY
Status: DISCONTINUED | OUTPATIENT
Start: 2024-07-11 | End: 2024-07-12 | Stop reason: HOSPADM

## 2024-07-10 RX ORDER — RISPERIDONE 1 MG/1
1 TABLET ORAL EVERY 12 HOURS
Status: DISCONTINUED | OUTPATIENT
Start: 2024-07-10 | End: 2024-07-12 | Stop reason: HOSPADM

## 2024-07-10 RX ORDER — BUSPIRONE HYDROCHLORIDE 10 MG/1
15 TABLET ORAL 3 TIMES DAILY
Status: DISCONTINUED | OUTPATIENT
Start: 2024-07-10 | End: 2024-07-12 | Stop reason: HOSPADM

## 2024-07-10 RX ADMIN — FAMOTIDINE 20 MG: 20 TABLET, FILM COATED ORAL at 20:58

## 2024-07-10 RX ADMIN — IOPAMIDOL 100 ML: 755 INJECTION, SOLUTION INTRAVENOUS at 15:25

## 2024-07-10 RX ADMIN — SODIUM CHLORIDE 1000 ML: 9 INJECTION, SOLUTION INTRAVENOUS at 15:56

## 2024-07-10 RX ADMIN — OXYCODONE HYDROCHLORIDE 5 MG: 5 TABLET ORAL at 22:58

## 2024-07-10 RX ADMIN — GABAPENTIN 600 MG: 300 CAPSULE ORAL at 20:58

## 2024-07-10 RX ADMIN — RISPERIDONE 1 MG: 1 TABLET, FILM COATED ORAL at 20:58

## 2024-07-10 RX ADMIN — METOPROLOL TARTRATE 50 MG: 50 TABLET, FILM COATED ORAL at 20:58

## 2024-07-10 RX ADMIN — DICYCLOMINE HYDROCHLORIDE 20 MG: 20 TABLET ORAL at 20:58

## 2024-07-10 RX ADMIN — DOCUSATE SODIUM 100 MG: 100 CAPSULE, LIQUID FILLED ORAL at 20:58

## 2024-07-10 RX ADMIN — CLONAZEPAM 0.5 MG: 0.5 TABLET ORAL at 20:58

## 2024-07-10 RX ADMIN — BUSPIRONE HYDROCHLORIDE 15 MG: 10 TABLET ORAL at 20:58

## 2024-07-10 ASSESSMENT — PAIN DESCRIPTION - PAIN TYPE: TYPE: CHRONIC PAIN

## 2024-07-10 ASSESSMENT — LIFESTYLE VARIABLES
HOW MANY STANDARD DRINKS CONTAINING ALCOHOL DO YOU HAVE ON A TYPICAL DAY: PATIENT DOES NOT DRINK
HOW OFTEN DO YOU HAVE A DRINK CONTAINING ALCOHOL: NEVER

## 2024-07-10 ASSESSMENT — PAIN DESCRIPTION - ORIENTATION: ORIENTATION: RIGHT

## 2024-07-10 ASSESSMENT — PAIN SCALES - GENERAL
PAINLEVEL_OUTOF10: 5
PAINLEVEL_OUTOF10: 1
PAINLEVEL_OUTOF10: 1
PAINLEVEL_OUTOF10: 5

## 2024-07-10 ASSESSMENT — PAIN DESCRIPTION - FREQUENCY: FREQUENCY: CONTINUOUS

## 2024-07-10 ASSESSMENT — PAIN - FUNCTIONAL ASSESSMENT
PAIN_FUNCTIONAL_ASSESSMENT: PREVENTS OR INTERFERES SOME ACTIVE ACTIVITIES AND ADLS
PAIN_FUNCTIONAL_ASSESSMENT: 0-10

## 2024-07-10 ASSESSMENT — PAIN DESCRIPTION - LOCATION
LOCATION: BUTTOCKS
LOCATION: BUTTOCKS

## 2024-07-10 ASSESSMENT — PAIN DESCRIPTION - ONSET: ONSET: ON-GOING

## 2024-07-10 ASSESSMENT — PAIN DESCRIPTION - DESCRIPTORS: DESCRIPTORS: DISCOMFORT

## 2024-07-10 NOTE — ED PROVIDER NOTES
Emergency Department Provider Note       PCP: None, None   Age: 44 y.o.   Sex: male     DISPOSITION Decision To Admit 07/10/2024 05:13:48 PM       ICD-10-CM    1. Acute GI bleeding  K92.2       2. Anemia due to acute blood loss  D62           Medical Decision Making     I reviewed pt's chart.  He had anemia at [facility] during previous admit end of May, hgb 7-8.  Early in June had hgb 10.  He was recently admitted to [facility] for ESBL UTI, AMS, and hyponatremia.  He had wound debridement by surgery there.  He refused colonscopy at the time.  He continues to be anemic with active bleeding.  Will require admit.  Will check CT.    Pt told we needed urine sample.  He refused straight cath stating he has strictures.  Given fluids and will attempt condom cath.     1 or more chronic illnesses with a severe exacerbation or progression.  Prescription drug management performed.  Discussion with external consultants.  Chronic medical problems impacting care include spina bifida .    I independently ordered and reviewed each unique test.  I reviewed external records: provider visit note from PCP.  I reviewed external records: provider visit note from outside specialist.  I reviewed external records: previous lab results from outside ED. D/c summary [facility]    I interpreted the X-rays No acute infiltrate.  I interpreted the CT Scan chronic constipation, edema/inflammation changes sacral area.  My Independent EKG Interpretation: sinus rhythm, no evidence of arrhythmia      ST Segments:Nonspecific ST segments - NO STEMI   Rate: 115  The patient was admitted and I have discussed patient management with the admitting provider.Trinity          History     Presents with complaint of \"hemorrhage\" from sacral wounds.  He states wound/rectum chronically bleeds but this was much worse.  Patient has chronic sacral wounds for many years.  He has a history of spina bifida and lives in a SNF.  He states that it bleeds all the time but was worse

## 2024-07-10 NOTE — ED TRIAGE NOTES
Patient arrives to ED via EMS from [facility]. Patient reports wounds on his buttocks. Patient wanted to be sent out to be evaluated  in the ER. Patient reports he has had these wounds for 10 years but the last 2-3 days it has gotten worse. Patient reports pain to buttocks. Denies fever or chills.

## 2024-07-10 NOTE — ED NOTES
TRANSFER - OUT REPORT:    Verbal report given to DON Nicholson  on Bereket Dyson Jr.  being transferred to [facility] for routine progression of patient care       Report consisted of patient's Situation, Background, Assessment and   Recommendations(SBAR).     Information from the following report(s) ED SBAR was reviewed with the receiving nurse.    [place] Fall Assessment:    Presents to emergency department  because of falls (Syncope, seizure, or loss of consciousness): No  Age > 70: No  Altered Mental Status, Intoxication with alcohol or substance confusion (Disorientation, impaired judgment, poor safety awaremess, or inability to follow instructions): No  Impaired Mobility: Ambulates or transfers with assistive devices or assistance; Unable to ambulate or transer.: Yes  Nursing Judgement: Yes          Lines:   Peripheral IV 07/10/24 Right Antecubital (Active)        Opportunity for questions and clarification was provided.      Patient transported with:  O2 @ 2lpm           Jevon Hicks RN  07/10/24 5915

## 2024-07-10 NOTE — H&P
Hospitalist History and Physical   Admit Date:  7/10/2024  1:26 PM   Name:  Bereket Dyson Jr.   Age:  44 y.o.  Sex:  male  :  1980   MRN:  [id number]   Room:  ER02/02    Presenting/Chief Complaint: Wound Check     Reason(s) for Admission: Lower GI bleed [K92.2]     History of Present Illness:   Bereket Dyson Jr. is a 44 y.o. male with medical history of spina bifida complicated by recurrent sacral ulcerations s/p debridement, chronic hypoxic respiratory failure,  DM type II, and severe obesity presents from NH with worsening lower bleeding. Patient says that he thinks it is coming from his sacral/buttock wounds and not his GI tract although ED physician believes the converse. He has not noticed any other symptoms including chest pain, shortness of breath, abdominal pain, nausea/vomiting. Of note, patient was seen for lower bleeding at [facility] with noted Hgb of 10 in early . At that time, it was recommended that patient undergo a diagnostic colonoscopy but patient refused.     At bedside, discussed option of colonoscopy with patient if his wounds turn out to not be the source of bleeding. He is under the assumption that \"I would then get a colostomy\". Explained to patient the reasoning of a colonoscopy and what it is used for and how it is not the same as undergoing a colostomy. Patient agreeable to colonoscopy if clinically indicated.     ED course: Hgb of 7.4. sodium of 127. Blood cultures collected x2. CXR negative. CT imaging shows right ischial ulceration. Picture of wound placed in chart by Dr. Bolton. Hospitalist consulted for admission.     Assessment & Plan:     Principal Problem:    Lower GI bleed vs bleeding from sacral wounds  - consult to general surgery tomorrow  - trend Hgb/Hct serially  - transfuse for Hgb<7  - if wounds not considered source, will consult GI for colonscopy (patient agreeable to colonoscope if clinically indicated)  - avoid heparin products and NSAIDs  - supportive  Neutrophils % 69 43 - 78 %    Lymphocytes % 18 13 - 44 %    Monocytes % 9 4.0 - 12.0 %    Eosinophils % 2 0.5 - 7.8 %    Basophils % 1 0.0 - 2.0 %    Immature Granulocytes % 1 0.0 - 5.0 %    Neutrophils Absolute 7.6 1.7 - 8.2 K/UL    Lymphocytes Absolute 1.9 0.5 - 4.6 K/UL    Monocytes Absolute 1.0 0.1 - 1.3 K/UL    Eosinophils Absolute 0.2 0.0 - 0.8 K/UL    Basophils Absolute 0.1 0.0 - 0.2 K/UL    Immature Granulocytes Absolute 0.2 0.0 - 0.5 K/UL   Comprehensive Metabolic Panel    Collection Time: 07/10/24  1:42 PM   Result Value Ref Range    Sodium 127 (L) 136 - 145 mmol/L    Potassium 5.2 (H) 3.5 - 5.1 mmol/L    Chloride 92 (L) 98 - 107 mmol/L    CO2 27 20 - 28 mmol/L    Anion Gap 8 (L) 9 - 18 mmol/L    Glucose 262 (H) 70 - 99 mg/dL    BUN 21 6 - 23 MG/DL    Creatinine 0.86 0.80 - 1.30 MG/DL    Est, Glom Filt Rate >90 >60 ml/min/1.73m2    Calcium 9.2 8.8 - 10.2 MG/DL    Total Bilirubin <0.2 0.0 - 1.2 MG/DL    ALT 7 (L) 12 - 65 U/L    AST 15 15 - 37 U/L    Alk Phosphatase 41 40 - 129 U/L    Total Protein 7.1 6.3 - 8.2 g/dL    Albumin 2.2 (L) 3.5 - 5.0 g/dL    Globulin 4.8 (H) 2.3 - 3.5 g/dL    Albumin/Globulin Ratio 0.5 (L) 1.0 - 1.9     TYPE AND SCREEN    Collection Time: 07/10/24  3:56 PM   Result Value Ref Range    Crossmatch expiration date 07/13/2024,2359     ABO/Rh A POSITIVE     Antibody Screen NEG        No results for input(s): \"COVID19\" in the last 72 hours.    CT ABDOMEN PELVIS W IV CONTRAST Additional Contrast? None    Result Date: 7/10/2024  CT OF THE ABDOMEN AND PELVIS INDICATION: Rectal bleed TECHNIQUE: Multiple 2D axial images were obtained through the abdomen and pelvis.  Oral contrast was used for bowel opacification.  100mL of Isovue 370 intravenous contrast was used for better evaluation of solid organs and vascular structures.  Radiation dose reduction techniques were used for this study.  All CT scans performed at this facility use one or all of the following: Automated exposure control,

## 2024-07-11 ENCOUNTER — HOSPITAL ENCOUNTER (OUTPATIENT)
Dept: NUCLEAR MEDICINE | Age: 44
Setting detail: OBSERVATION
End: 2024-07-11
Payer: MEDICARE

## 2024-07-11 LAB
ANION GAP SERPL CALC-SCNC: 9 MMOL/L (ref 9–18)
BUN SERPL-MCNC: 16 MG/DL (ref 6–23)
CALCIUM SERPL-MCNC: 8.8 MG/DL (ref 8.8–10.2)
CHLORIDE SERPL-SCNC: 94 MMOL/L (ref 98–107)
CO2 SERPL-SCNC: 26 MMOL/L (ref 20–28)
CREAT SERPL-MCNC: 0.74 MG/DL (ref 0.8–1.3)
GLUCOSE BLD STRIP.AUTO-MCNC: 184 MG/DL (ref 65–100)
GLUCOSE BLD STRIP.AUTO-MCNC: 233 MG/DL (ref 65–100)
GLUCOSE BLD STRIP.AUTO-MCNC: 268 MG/DL (ref 65–100)
GLUCOSE BLD STRIP.AUTO-MCNC: 287 MG/DL (ref 65–100)
GLUCOSE BLD STRIP.AUTO-MCNC: 316 MG/DL (ref 65–100)
GLUCOSE SERPL-MCNC: 242 MG/DL (ref 70–99)
HCT VFR BLD AUTO: 22.1 % (ref 41.1–50.3)
HCT VFR BLD AUTO: 24.6 % (ref 41.1–50.3)
HGB BLD-MCNC: 6.8 G/DL (ref 13.6–17.2)
HGB BLD-MCNC: 7.5 G/DL (ref 13.6–17.2)
HISTORY CHECK: NORMAL
POTASSIUM SERPL-SCNC: 5 MMOL/L (ref 3.5–5.1)
SERVICE CMNT-IMP: ABNORMAL
SODIUM SERPL-SCNC: 130 MMOL/L (ref 136–145)

## 2024-07-11 PROCEDURE — 80048 BASIC METABOLIC PNL TOTAL CA: CPT

## 2024-07-11 PROCEDURE — 85014 HEMATOCRIT: CPT

## 2024-07-11 PROCEDURE — 94761 N-INVAS EAR/PLS OXIMETRY MLT: CPT

## 2024-07-11 PROCEDURE — 6370000000 HC RX 637 (ALT 250 FOR IP): Performed by: STUDENT IN AN ORGANIZED HEALTH CARE EDUCATION/TRAINING PROGRAM

## 2024-07-11 PROCEDURE — 36415 COLL VENOUS BLD VENIPUNCTURE: CPT

## 2024-07-11 PROCEDURE — A9560 TC99M LABELED RBC: HCPCS

## 2024-07-11 PROCEDURE — 3430000000 HC RX DIAGNOSTIC RADIOPHARMACEUTICAL

## 2024-07-11 PROCEDURE — G0378 HOSPITAL OBSERVATION PER HR: HCPCS

## 2024-07-11 PROCEDURE — 85018 HEMOGLOBIN: CPT

## 2024-07-11 PROCEDURE — 82962 GLUCOSE BLOOD TEST: CPT

## 2024-07-11 PROCEDURE — 6360000002 HC RX W HCPCS: Performed by: INTERNAL MEDICINE

## 2024-07-11 PROCEDURE — 2700000000 HC OXYGEN THERAPY PER DAY

## 2024-07-11 PROCEDURE — 94760 N-INVAS EAR/PLS OXIMETRY 1: CPT

## 2024-07-11 PROCEDURE — 6370000000 HC RX 637 (ALT 250 FOR IP): Performed by: INTERNAL MEDICINE

## 2024-07-11 PROCEDURE — 78278 ACUTE GI BLOOD LOSS IMAGING: CPT

## 2024-07-11 PROCEDURE — 94640 AIRWAY INHALATION TREATMENT: CPT

## 2024-07-11 PROCEDURE — P9016 RBC LEUKOCYTES REDUCED: HCPCS

## 2024-07-11 PROCEDURE — 6370000000 HC RX 637 (ALT 250 FOR IP): Performed by: NURSE PRACTITIONER

## 2024-07-11 PROCEDURE — 99222 1ST HOSP IP/OBS MODERATE 55: CPT | Performed by: INTERNAL MEDICINE

## 2024-07-11 PROCEDURE — 36430 TRANSFUSION BLD/BLD COMPNT: CPT

## 2024-07-11 RX ORDER — BUDESONIDE 0.5 MG/2ML
0.5 INHALANT ORAL
Status: DISCONTINUED | OUTPATIENT
Start: 2024-07-11 | End: 2024-07-12 | Stop reason: HOSPADM

## 2024-07-11 RX ORDER — DEXTROSE MONOHYDRATE 100 MG/ML
INJECTION, SOLUTION INTRAVENOUS CONTINUOUS PRN
Status: DISCONTINUED | OUTPATIENT
Start: 2024-07-11 | End: 2024-07-12

## 2024-07-11 RX ORDER — BUDESONIDE AND FORMOTEROL FUMARATE DIHYDRATE 160; 4.5 UG/1; UG/1
2 AEROSOL RESPIRATORY (INHALATION) 2 TIMES DAILY
Status: DISCONTINUED | OUTPATIENT
Start: 2024-07-11 | End: 2024-07-11

## 2024-07-11 RX ORDER — QUETIAPINE FUMARATE 100 MG/1
300 TABLET, FILM COATED ORAL NIGHTLY
Status: DISCONTINUED | OUTPATIENT
Start: 2024-07-12 | End: 2024-07-12 | Stop reason: HOSPADM

## 2024-07-11 RX ORDER — INSULIN LISPRO 100 [IU]/ML
0-4 INJECTION, SOLUTION INTRAVENOUS; SUBCUTANEOUS NIGHTLY
Status: DISCONTINUED | OUTPATIENT
Start: 2024-07-11 | End: 2024-07-12 | Stop reason: HOSPADM

## 2024-07-11 RX ORDER — IBUPROFEN 600 MG/1
1 TABLET ORAL PRN
Status: DISCONTINUED | OUTPATIENT
Start: 2024-07-11 | End: 2024-07-12

## 2024-07-11 RX ORDER — ARFORMOTEROL TARTRATE 15 UG/2ML
15 SOLUTION RESPIRATORY (INHALATION)
Status: DISCONTINUED | OUTPATIENT
Start: 2024-07-11 | End: 2024-07-12 | Stop reason: HOSPADM

## 2024-07-11 RX ORDER — INSULIN LISPRO 100 [IU]/ML
0-8 INJECTION, SOLUTION INTRAVENOUS; SUBCUTANEOUS
Status: DISCONTINUED | OUTPATIENT
Start: 2024-07-11 | End: 2024-07-12 | Stop reason: HOSPADM

## 2024-07-11 RX ORDER — DIVALPROEX SODIUM 500 MG/1
500 TABLET, EXTENDED RELEASE ORAL 2 TIMES DAILY
Status: DISCONTINUED | OUTPATIENT
Start: 2024-07-11 | End: 2024-07-12 | Stop reason: HOSPADM

## 2024-07-11 RX ORDER — SODIUM CHLORIDE 9 MG/ML
INJECTION, SOLUTION INTRAVENOUS PRN
Status: DISCONTINUED | OUTPATIENT
Start: 2024-07-11 | End: 2024-07-12

## 2024-07-11 RX ORDER — SODIUM CHLORIDE 1 G/1
1 TABLET ORAL 3 TIMES DAILY
Status: DISCONTINUED | OUTPATIENT
Start: 2024-07-11 | End: 2024-07-12 | Stop reason: HOSPADM

## 2024-07-11 RX ADMIN — DIVALPROEX SODIUM 500 MG: 500 TABLET, EXTENDED RELEASE ORAL at 18:26

## 2024-07-11 RX ADMIN — ATORVASTATIN CALCIUM 80 MG: 80 TABLET, FILM COATED ORAL at 09:37

## 2024-07-11 RX ADMIN — METOPROLOL TARTRATE 50 MG: 50 TABLET, FILM COATED ORAL at 09:42

## 2024-07-11 RX ADMIN — DICYCLOMINE HYDROCHLORIDE 20 MG: 20 TABLET ORAL at 20:05

## 2024-07-11 RX ADMIN — BUSPIRONE HYDROCHLORIDE 15 MG: 10 TABLET ORAL at 09:39

## 2024-07-11 RX ADMIN — Medication 1 G: at 13:52

## 2024-07-11 RX ADMIN — ESCITALOPRAM OXALATE 15 MG: 10 TABLET ORAL at 09:40

## 2024-07-11 RX ADMIN — RISPERIDONE 1 MG: 1 TABLET, FILM COATED ORAL at 09:39

## 2024-07-11 RX ADMIN — INSULIN LISPRO 2 UNITS: 100 INJECTION, SOLUTION INTRAVENOUS; SUBCUTANEOUS at 18:27

## 2024-07-11 RX ADMIN — CETIRIZINE HYDROCHLORIDE 10 MG: 10 TABLET, FILM COATED ORAL at 09:39

## 2024-07-11 RX ADMIN — ALBUTEROL SULFATE 2.5 MG: 2.5 SOLUTION RESPIRATORY (INHALATION) at 01:37

## 2024-07-11 RX ADMIN — RISPERIDONE 1 MG: 1 TABLET, FILM COATED ORAL at 20:05

## 2024-07-11 RX ADMIN — DICYCLOMINE HYDROCHLORIDE 20 MG: 20 TABLET ORAL at 09:37

## 2024-07-11 RX ADMIN — EZETIMIBE 10 MG: 10 TABLET ORAL at 09:39

## 2024-07-11 RX ADMIN — INSULIN GLARGINE 35 UNITS: 100 INJECTION, SOLUTION SUBCUTANEOUS at 09:36

## 2024-07-11 RX ADMIN — HYDROXYZINE HYDROCHLORIDE 50 MG: 25 TABLET, FILM COATED ORAL at 01:16

## 2024-07-11 RX ADMIN — GABAPENTIN 600 MG: 300 CAPSULE ORAL at 20:42

## 2024-07-11 RX ADMIN — FAMOTIDINE 20 MG: 20 TABLET, FILM COATED ORAL at 09:38

## 2024-07-11 RX ADMIN — METOPROLOL TARTRATE 50 MG: 50 TABLET, FILM COATED ORAL at 20:05

## 2024-07-11 RX ADMIN — DOCUSATE SODIUM 100 MG: 100 CAPSULE, LIQUID FILLED ORAL at 20:04

## 2024-07-11 RX ADMIN — BUSPIRONE HYDROCHLORIDE 15 MG: 10 TABLET ORAL at 20:05

## 2024-07-11 RX ADMIN — DICYCLOMINE HYDROCHLORIDE 20 MG: 20 TABLET ORAL at 13:52

## 2024-07-11 RX ADMIN — GABAPENTIN 600 MG: 300 CAPSULE ORAL at 09:39

## 2024-07-11 RX ADMIN — DOCUSATE SODIUM 100 MG: 100 CAPSULE, LIQUID FILLED ORAL at 09:38

## 2024-07-11 RX ADMIN — QUETIAPINE FUMARATE 300 MG: 100 TABLET ORAL at 09:37

## 2024-07-11 RX ADMIN — FENOFIBRATE 160 MG: 160 TABLET ORAL at 09:39

## 2024-07-11 RX ADMIN — TECHNETIUM TC 99M-LABELED RED BLOOD CELLS 25 MILLICURIE: KIT at 15:35

## 2024-07-11 RX ADMIN — FAMOTIDINE 20 MG: 20 TABLET, FILM COATED ORAL at 20:05

## 2024-07-11 RX ADMIN — Medication 1 G: at 20:04

## 2024-07-11 RX ADMIN — FERROUS GLUCONATE 324 MG: 324 TABLET ORAL at 09:38

## 2024-07-11 RX ADMIN — INSULIN LISPRO 6 UNITS: 100 INJECTION, SOLUTION INTRAVENOUS; SUBCUTANEOUS at 12:22

## 2024-07-11 RX ADMIN — CLONAZEPAM 0.5 MG: 0.5 TABLET ORAL at 09:41

## 2024-07-11 ASSESSMENT — ENCOUNTER SYMPTOMS
VOMITING: 0
NAUSEA: 0
ABDOMINAL PAIN: 0
BLOOD IN STOOL: 0
SHORTNESS OF BREATH: 0
CONSTIPATION: 0
TROUBLE SWALLOWING: 0
ABDOMINAL DISTENTION: 0
DIARRHEA: 0
COLOR CHANGE: 0

## 2024-07-11 ASSESSMENT — PAIN SCALES - GENERAL
PAINLEVEL_OUTOF10: 0

## 2024-07-11 NOTE — CONSULTS
Assessment/Plan  Mr. Bereket Dyson Jr. is a 44 y.o. male presenting with acute on chronic anemia.  Hemoglobin today was 6.8 and is currently receiving 1 unit packed red blood cells.  I discussed colonoscopy with him including risk and benefits to determine if he is truly having a lower GI bleed and he declines to have a colonoscopy.  We spoke about alternatives to a colonoscopy for GI bleeding and he agreed with a nuclear medicine bleeding scan.  If bleeding scan is positive we can reassess him for colonoscopy otherwise GI will sign off.      MEDs:     Current Facility-Administered Medications   Medication Dose Route Frequency Provider Last Rate Last Admin    0.9 % sodium chloride infusion   IntraVENous PRN Skyler Grant DO        glucose chewable tablet 16 g  4 tablet Oral PRN Skyler Grant DO        dextrose bolus 10% 125 mL  125 mL IntraVENous PRN Skyler Grant DO        Or    dextrose bolus 10% 250 mL  250 mL IntraVENous PRN Juanita, kSyler, DO        Glucagon Emergency KIT 1 mg  1 mg SubCUTAneous PRN Skyler Grant,         dextrose 10 % infusion   IntraVENous Continuous PRN Skyler Grant DO        insulin lispro (HUMALOG,ADMELOG) injection vial 0-8 Units  0-8 Units SubCUTAneous TID WC Skyler Grant DO   6 Units at 07/11/24 1222    insulin lispro (HUMALOG,ADMELOG) injection vial 0-4 Units  0-4 Units SubCUTAneous Nightly Skyler Grant, DO        sodium chloride tablet 1 g  1 g Oral TID Skyler Grant DO   1 g at 07/11/24 1352    divalproex (DEPAKOTE ER) extended release tablet 500 mg  500 mg Oral BID Skyler Grant DO        [START ON 7/12/2024] QUEtiapine (SEROQUEL) tablet 300 mg  300 mg Oral Nightly Skyler Grant,         arformoterol tartrate (BROVANA) nebulizer solution 15 mcg  15 mcg Nebulization BID RT Skyler Grant,         budesonide (PULMICORT) nebulizer suspension 500 mcg  0.5 mg Nebulization BID RT Sykler Grant,         bisacodyl (DULCOLAX) EC

## 2024-07-11 NOTE — PROGRESS NOTES
02 3 liters.  Alert, oriented.  No BM or blood seen.  Wet to dry dressing on buttock wound. Atarax given for anxiety.  Bed alarm on and call light in reach.

## 2024-07-11 NOTE — PROGRESS NOTES
Comprehensive Nutrition Assessment    Type and Reason for Visit: Initial, Positive Nutrition Screen  Best Practice Alert for Pressure Injury Stage 2 or greater    Nutrition Recommendations/Plan:   Meals and Snacks:  Diet: Continue current order  Nutrition Supplement Therapy:  Medical food supplement therapy:  Initiate Glucerna Shake twice per day (this provides 220 kcal and 10 grams protein per bottle) and Morteza twice per day (this provides 90 kcal and 2.5 grams protein per packet)     Malnutrition Assessment:  Malnutrition Status: Insufficient data     Nutrition Assessment:  Nutrition History:   Pt admitted from nursing home.  At discharge 7/1 from [facility] pt was on heart healthy diet with boost glucose control.       Do You Have Any Cultural, Christianity, or Ethnic Food Preferences?: No   Weight History:   Per RD note at [facility] 6/28:  06/28/24 : (!) 139 kg (306 lb 6.4 oz)  06/07/24 : (!) 140 kg (308 lb)  11/30/23 : (!) 152 kg (335 lb)  Unknown wt sources, unable to validate potential wt loss  Nutrition Background:       PMH remarkable obesity, depression, GERD, DM, HTN, spina bifida, neurogenic bladder, R BKA, chronic pain and chronic sacral wounds.  Presented from nursing home d/t concerns for bleeding.  Admitted with acute blood loss anemia - sacral wound vs lower GI bleed.    WC following - Chronic sacrum/R buttock wound s/p debridement 6/7/24 and 6/28/24 at [facility].      Surgery Consulted: no surgical intervention at this time  GI consulted: Pt declined colonoscopy, signed off.    Nutrition Interval:  Pt off unit for nuclear med scan at RD encounter.  Discussed with DON Esparza and Alicja, PCT.  Pt self feeds without difficulty, eats well.  Noted to eat well per review of [facility] note as well.      Current Nutrition Therapies:  ADULT DIET; Regular; 3 carb choices (45 gm/meal)    Current Intake:   Average Meal Intake: % Average Supplements Intake: None Ordered      Anthropometric Measures:  Height: 175.3 cm (5'

## 2024-07-11 NOTE — PROGRESS NOTES
Hospitalist Progress Note   Admit Date:  7/10/2024  1:26 PM   Name:  Bereket Dyson Jr.   Age:  44 y.o.  Sex:  male  :  1980   MRN:  [id number]   Room:  9/    Presenting/Chief Complaint: Wound Check     Reason(s) for Admission: Anemia due to acute blood loss [D62]  Acute GI bleeding [K92.2]  Lower GI bleed [K92.2]     Hospital Course:   Bereket Dyson Jr. is a 44 y.o. male with medical history of spina bifida and diabetes who presented to the ED from [facility] on 7/10/2024 with cc of worsening bleeding over the last couple days from his rectum/sacral wound.  Labs showed sodium 127, potassium 5.2, WBC 10.9, and Hgb 7.4.  Chest x-ray showed no acute normalities.  CT abdomen pelvis showed prominent stool throughout the colon and right ischial ulcer with overlying bandage. He received 1L NS, general surgery was consulted, and he was admitted for further care.     Subjective & 24hr Events:   No acute overnight events.  Patient resting in bed eating breakfast this morning.  No reported bleeding.  Hemoglobin dropped to 6.8 this a.m.    Assessment & Plan:     Acute blood loss anemia  Due to sacral wound vs lower GI bleed  Had recent surgical debridement of the sacral wound on  and   General surgery following  Consult GI given concern for lower GI bleed  Transfuse 1 unit PRBC this a.m.  Trend and transfuse hemoglobin less than 7  Baseline Hgb 9 -10 as recently as 24  Recently evaluated at [facility] and GI offered colonoscopy, but patient refused further work up at that time.     Right sacral wound  S/p surgical debridement at [facility] on  and    Wound care following    Hyperkalemia  Repeat BMP pending     Diabetes mellitus  Continue Lantus and sliding scale insulin    Spina bifida  S/p right BKA  Complicates care  Turning q2h to prevent skin breakdown     Chronic hyponatremia  Repeat BMP pending  Resume home sodium chloride tablets 1g TID    Mood disorder  Continue buspirone,  Klonopin, escitalopram, Seroquel, risperidone, and Depakote    Neuropathy  Continue gabapentin    Hypertension  Continue lisinopril and metoprolol    Hyperlipidemia  Continue atorvastatin and Zetia    Constipation  Continue docusate     PT/OT evals needed/ordered?  No  Diet:  ADULT DIET; Regular; 3 carb choices (45 gm/meal)  VTE prophylaxis: SCD's   Code status: Full Code  Dispo: Discharge pending stable hemoglobin and general surgery and GI recommendations.    Non-peripheral Lines and Tubes (if present):      External Urinary Catheter (Active)        Hospital Problems:  Principal Problem:    Lower GI bleed  Active Problems:    Type 2 diabetes mellitus (HCC)    Pressure injury of skin of sacral region    Hx of right BKA (HCC)    Spina bifida (HCC)    Severe obesity (BMI 35.0-35.9 with comorbidity) (HCC)    Hyponatremia  Resolved Problems:    * No resolved hospital problems. *      Objective:   Patient Vitals for the past 24 hrs:   Temp Pulse Resp BP SpO2   07/11/24 1121 98.4 °F (36.9 °C) 96 24 109/61 --   07/11/24 1102 -- -- 21 -- --   07/11/24 1045 98.6 °F (37 °C) 98 21 (!) 122/57 99 %   07/11/24 0942 -- (!) 103 -- 114/73 --   07/11/24 0746 98.6 °F (37 °C) (!) 103 20 114/73 95 %   07/11/24 0426 97.6 °F (36.4 °C) 100 18 113/64 97 %   07/11/24 0135 -- (!) 106 22 -- 97 %   07/10/24 2258 -- -- 17 -- --   07/10/24 2036 100 °F (37.8 °C) (!) 116 18 (!) 150/73 97 %   07/10/24 1800 -- (!) 105 -- 99/62 94 %   07/10/24 1730 -- (!) 107 -- 105/66 95 %   07/10/24 1713 -- (!) 106 -- 109/73 --   07/10/24 1515 -- (!) 109 -- 110/60 --   07/10/24 1334 99.1 °F (37.3 °C) -- -- -- --   07/10/24 1332 -- (!) 115 16 -- --   07/10/24 1330 -- -- -- 128/80 96 %       Physical Exam:   General: in no acute distress.   Head: no scleral icterus   CV: regular rate and rhythm   Lungs: clear breath sounds bilaterally   Abdomen: abdomen is soft, non-tender, non-distended   Extremities: right BKA.  Right sacral wound.  Skin: warm and dry    Neuro: no

## 2024-07-11 NOTE — WOUND CARE
Consulted for Chronic Sacrum/R buttock wound. Pt extremely drowsy. Repeated [facility] admissions with sacral debridements already performed 6/7 & 6/28. Unsure if diverting colostomy was offered, but would greatly benefit from a diverting colostomy to aid in wound healing should Pt be agreeable. Noted Surg with no further intervention. Pt already on wound bed, continue to turn Q2H to offload weight distribution.    Sacrum/Coccyx/R buttock [id number], full thickness, pink/red, granular, slough, moist eschar, skin/rectum macerated, defined/flat edges, moderate serosanguinous drainage, mild odor. Recommend wound cleanser, NS wet-dry, cover with large ABD or large pink silicone border foam dressing daily/PRN.      L medial ankle dry/fibrin, healing wound. Okay to leave BOBBY.      R knee abrasion, dry/fibrin. Okay to leave BOBBY.  R anterior bony protrusion on stump, dry/healed ulcer above protrusion. Okay to leave BOBBY or silicone border foam dressing for cushioning.

## 2024-07-11 NOTE — PROGRESS NOTES
1019  Patient awake,alert and oriented. Diminished lung sounds bilaterally. External flores draining yellow clear urine.  10:45   Wound care redressed sacral and buttocks wounds.   1107  Hgb 6.8, Hct 22.1 - Whole blood adm.began. Start of infusion @75 cc/hr   11:28 AM   15 minute blood infusion complete, no signs of allergic reaction, patient is sleeping, Blood infusing @125 ml/hr   1311  CBC rescheduled, Blood transfusion in progress.   1418  Hourly rounding, patient continues to be very drowsy, but arousable. Blood glucose obtain high 200.S New set of vital signs obtain WNL. Buspar and gabapentin held, ok with attending.    1425  Patient scheduled for bleeding scan, will be completed today.

## 2024-07-11 NOTE — CONSULTS
Discharged home  H&P/Consult Note/Progress Note/Office Note:   Bereket Dyson Jr.  MRN: [id number]  :1980  Age:44 y.o.    General Surgery Consult ordered by: Dr Brice; Hospitalist  Reason for General Surgery Consult: Hx spina bifida w/ chronic sacral wounds, eval for debridement     HPI: Bereket Dyson Jr. is a 44 y.o. male with a past medical history of Obesity, Depression, GERD, DM II, HTN, Spina bifida, neurogenic bladder, chronic pain and chronic sacral wounds who presented to the ED 7/10/24 from Nursing Home due to concerns due to bleeding. Pt states he thought bleeding was coming from his sacral wounds rather than his rectum, however, blood appeared to be from his rectum. Of note, patient was seen for lower bleeding at [facility] with noted Hgb of 10 in early . At that time, it was recommended that patient undergo a diagnostic colonoscopy but patient refused. Pt also underwent sacral wound debridement at [facility] 24 and 24.    Pt admitted by Hospitalist. General Surgery consulted to evaluate sacral wounds for debridement.     7/10/24 CT Abdomen & Pelvis  IMPRESSION:  Prominent stool throughout the colon.     Right ischial ulcer with overlying bandage. Correlate clinically.    Past Surgical History: Right BKA, multiple sacral wound debridements.     Blood thinners: Pt states he takes blood thinners; only ASA 325mg daily listed on home meds.    Past Medical History:   Diagnosis Date    Depression     GERD (gastroesophageal reflux disease)     Heart disease     Hypertension     Neurogenic bladder, NOS     Spina bifida (HCC)     Spina bifida without mention of hydrocephalus, unspecified region     Stress incontinence, male     Toxic effect of latex(989.82)     Urinary tract infection, site not specified      Past Surgical History:   Procedure Laterality Date    ORTHOPEDIC SURGERY Right     foot surgery    UROLOGICAL SURGERY      artificial urinary sphincter     Current Facility-Administered Medications   Medication Dose

## 2024-07-11 NOTE — PLAN OF CARE

## 2024-07-11 NOTE — CONSENT
Informed Consent for Blood Component Transfusion Note    I have discussed with the patient the rationale for blood component transfusion; its benefits in treating or preventing fatigue, organ damage, or death; and its risk which includes mild transfusion reactions, rare risk of blood borne infection, or more serious but rare reactions. I have discussed the alternatives to transfusion, including the risk and consequences of not receiving transfusion. The patient had an opportunity to ask questions and had agreed to proceed with transfusion of blood components.    Electronically signed by Skyler Grant DO on 7/11/24 at 9:50 AM EDT

## 2024-07-11 NOTE — PROGRESS NOTES
4 Eyes Skin Assessment     NAME:  Bereket Dyson Jr.  YOB: 1980  MEDICAL RECORD NUMBER:  [id number]    The patient is being assessed for  Admission    I agree that at least one RN has performed a thorough Head to Toe Skin Assessment on the patient. ALL assessment sites listed below have been assessed.      Areas assessed by both nurses:    Head, Face, Ears, Shoulders, Back, Chest, Arms, Elbows, Hands, Sacrum. Buttock, Coccyx, Ischium, and Legs. Feet and Heels        Does the Patient have a Wound? Yes wound(s) were present on assessment. LDA wound assessment was Initiated and completed by RN       Saeid Prevention initiated by RN: Yes  Wound Care Orders initiated by RN: Yes    Pressure Injury (Stage 3,4, Unstageable, DTI, NWPT, and Complex wounds) if present, place Wound referral order by RN under : Yes    New Ostomies, if present place, Ostomy referral order under : No     Nurse 1 eSignature: Electronically signed by Nguyen Delgado RN on 7/11/24 at 12:44 AM EDT    **SHARE this note so that the co-signing nurse can place an eSignature**    Nurse 2 eSignature: Electronically signed by CASSIDY MCLAUGHLIN RN on 7/11/24 at 3:39 AM EDT

## 2024-07-11 NOTE — CARE COORDINATION
07/11/24 1115   Service Assessment   Patient Orientation Alert and Oriented   Cognition Alert   History Provided By Patient   Primary Caregiver Self   Support Systems Family Members   Patient's Healthcare Decision Maker is: Legal Next of Kin   PCP Verified by CM Yes   Last Visit to PCP Within last 3 months   Current Functional Level Assistance with the following:   Can patient return to prior living arrangement Yes   Family able to assist with home care needs: No   Would you like for me to discuss the discharge plan with any other family members/significant others, and if so, who? No   Financial Resources Medicare;Medicaid   Community Resources Other (Comment)  (LTC)   Social/Functional History   Type of Home   (LTC)   Condition of Participation: Discharge Planning   The Plan for Transition of Care is related to the following treatment goals: return to [facility]   The Patient and/or Patient Representative was provided with a Choice of Provider? Patient   The Patient and/Or Patient Representative agree with the Discharge Plan? Yes   Freedom of Choice list was provided with basic dialogue that supports the patient's individualized plan of care/goals, treatment preferences, and shares the quality data associated with the providers?  Yes     Assessment completed with patient at bedside. Patient is a long term care resident at [facility]. Patient reports he is able to perform stand pivot transfers with assist from bed to wheelchair, but he is mostly bed bound. Patient uses oxygen at baseline. Demographics confirmed. MCCOY explained and signed. Discharge plan is to return to [facility]. CM called Francisco at [facility] and confirmed patient is able to return. Patient has a Medicaid bed hold that expires 7/20/24.    Lars ARTHUR, ACM  [place]

## 2024-07-12 VITALS
BODY MASS INDEX: 37.03 KG/M2 | SYSTOLIC BLOOD PRESSURE: 147 MMHG | HEIGHT: 69 IN | WEIGHT: 250 LBS | HEART RATE: 110 BPM | TEMPERATURE: 98.2 F | OXYGEN SATURATION: 95 % | DIASTOLIC BLOOD PRESSURE: 90 MMHG | RESPIRATION RATE: 18 BRPM

## 2024-07-12 PROBLEM — D62 ACUTE BLOOD LOSS ANEMIA: Status: ACTIVE | Noted: 2024-07-12

## 2024-07-12 LAB
ABO + RH BLD: NORMAL
ALBUMIN SERPL-MCNC: 2.3 G/DL (ref 3.5–5)
ALBUMIN/GLOB SERPL: 0.5 (ref 1–1.9)
ALP SERPL-CCNC: 40 U/L (ref 40–129)
ALT SERPL-CCNC: 5 U/L (ref 12–65)
ANION GAP SERPL CALC-SCNC: 10 MMOL/L (ref 9–18)
AST SERPL-CCNC: 21 U/L (ref 15–37)
BASOPHILS # BLD: 0.1 K/UL (ref 0–0.2)
BASOPHILS NFR BLD: 1 % (ref 0–2)
BILIRUB SERPL-MCNC: 0.2 MG/DL (ref 0–1.2)
BLD PROD TYP BPU: NORMAL
BLOOD BANK BLOOD PRODUCT EXPIRATION DATE: NORMAL
BLOOD BANK DISPENSE STATUS: NORMAL
BLOOD BANK ISBT PRODUCT BLOOD TYPE: 6200
BLOOD BANK PRODUCT CODE: NORMAL
BLOOD BANK UNIT TYPE AND RH: NORMAL
BLOOD GROUP ANTIBODIES SERPL: NORMAL
BPU ID: NORMAL
BUN SERPL-MCNC: 14 MG/DL (ref 6–23)
CALCIUM SERPL-MCNC: 8.9 MG/DL (ref 8.8–10.2)
CHLORIDE SERPL-SCNC: 94 MMOL/L (ref 98–107)
CO2 SERPL-SCNC: 26 MMOL/L (ref 20–28)
CREAT SERPL-MCNC: 0.76 MG/DL (ref 0.8–1.3)
CROSSMATCH RESULT: NORMAL
DIFFERENTIAL METHOD BLD: ABNORMAL
EOSINOPHIL # BLD: 0.3 K/UL (ref 0–0.8)
EOSINOPHIL NFR BLD: 4 % (ref 0.5–7.8)
ERYTHROCYTE [DISTWIDTH] IN BLOOD BY AUTOMATED COUNT: 17.7 % (ref 11.9–14.6)
GLOBULIN SER CALC-MCNC: 5.1 G/DL (ref 2.3–3.5)
GLUCOSE BLD STRIP.AUTO-MCNC: 188 MG/DL (ref 65–100)
GLUCOSE BLD STRIP.AUTO-MCNC: 319 MG/DL (ref 65–100)
GLUCOSE SERPL-MCNC: 184 MG/DL (ref 70–99)
HCT VFR BLD AUTO: 28.2 % (ref 41.1–50.3)
HGB BLD-MCNC: 8.6 G/DL (ref 13.6–17.2)
IMM GRANULOCYTES # BLD AUTO: 0.1 K/UL (ref 0–0.5)
IMM GRANULOCYTES NFR BLD AUTO: 1 % (ref 0–5)
LYMPHOCYTES # BLD: 2.3 K/UL (ref 0.5–4.6)
LYMPHOCYTES NFR BLD: 36 % (ref 13–44)
MAGNESIUM SERPL-MCNC: 1.8 MG/DL (ref 1.8–2.4)
MCH RBC QN AUTO: 24.3 PG (ref 26.1–32.9)
MCHC RBC AUTO-ENTMCNC: 30.5 G/DL (ref 31.4–35)
MCV RBC AUTO: 79.7 FL (ref 82–102)
MONOCYTES # BLD: 0.6 K/UL (ref 0.1–1.3)
MONOCYTES NFR BLD: 10 % (ref 4–12)
NEUTS SEG # BLD: 3.1 K/UL (ref 1.7–8.2)
NEUTS SEG NFR BLD: 48 % (ref 43–78)
NRBC # BLD: 0 K/UL (ref 0–0.2)
PLATELET # BLD AUTO: 406 K/UL (ref 150–450)
PMV BLD AUTO: 8.4 FL (ref 9.4–12.3)
POTASSIUM SERPL-SCNC: 4.6 MMOL/L (ref 3.5–5.1)
PROT SERPL-MCNC: 7.4 G/DL (ref 6.3–8.2)
RBC # BLD AUTO: 3.54 M/UL (ref 4.23–5.6)
SERVICE CMNT-IMP: ABNORMAL
SERVICE CMNT-IMP: ABNORMAL
SODIUM SERPL-SCNC: 130 MMOL/L (ref 136–145)
SPECIMEN EXP DATE BLD: NORMAL
UNIT DIVISION: 0
UNIT ISSUE DATE/TIME: NORMAL
WBC # BLD AUTO: 6.3 K/UL (ref 4.3–11.1)

## 2024-07-12 PROCEDURE — 6370000000 HC RX 637 (ALT 250 FOR IP): Performed by: STUDENT IN AN ORGANIZED HEALTH CARE EDUCATION/TRAINING PROGRAM

## 2024-07-12 PROCEDURE — 80053 COMPREHEN METABOLIC PANEL: CPT

## 2024-07-12 PROCEDURE — 2700000000 HC OXYGEN THERAPY PER DAY

## 2024-07-12 PROCEDURE — 6370000000 HC RX 637 (ALT 250 FOR IP): Performed by: INTERNAL MEDICINE

## 2024-07-12 PROCEDURE — 94640 AIRWAY INHALATION TREATMENT: CPT

## 2024-07-12 PROCEDURE — 83735 ASSAY OF MAGNESIUM: CPT

## 2024-07-12 PROCEDURE — 6360000002 HC RX W HCPCS: Performed by: STUDENT IN AN ORGANIZED HEALTH CARE EDUCATION/TRAINING PROGRAM

## 2024-07-12 PROCEDURE — 6370000000 HC RX 637 (ALT 250 FOR IP): Performed by: NURSE PRACTITIONER

## 2024-07-12 PROCEDURE — G0378 HOSPITAL OBSERVATION PER HR: HCPCS

## 2024-07-12 PROCEDURE — 6360000002 HC RX W HCPCS: Performed by: INTERNAL MEDICINE

## 2024-07-12 PROCEDURE — 94760 N-INVAS EAR/PLS OXIMETRY 1: CPT

## 2024-07-12 PROCEDURE — 36415 COLL VENOUS BLD VENIPUNCTURE: CPT

## 2024-07-12 PROCEDURE — 82962 GLUCOSE BLOOD TEST: CPT

## 2024-07-12 PROCEDURE — 85025 COMPLETE CBC W/AUTO DIFF WBC: CPT

## 2024-07-12 RX ORDER — OXYCODONE HYDROCHLORIDE 5 MG/1
5 TABLET ORAL ONCE
Status: COMPLETED | OUTPATIENT
Start: 2024-07-12 | End: 2024-07-12

## 2024-07-12 RX ORDER — OXYCODONE HYDROCHLORIDE 5 MG/1
5 TABLET ORAL EVERY 8 HOURS PRN
Status: DISCONTINUED | OUTPATIENT
Start: 2024-07-12 | End: 2024-07-12 | Stop reason: HOSPADM

## 2024-07-12 RX ORDER — OXYCODONE HYDROCHLORIDE 5 MG/1
5 TABLET ORAL EVERY 8 HOURS PRN
Qty: 10 TABLET | Refills: 0 | Status: SHIPPED | OUTPATIENT
Start: 2024-07-12 | End: 2024-07-15

## 2024-07-12 RX ORDER — CLONAZEPAM 0.5 MG/1
0.5 TABLET ORAL 2 TIMES DAILY
Qty: 10 TABLET | Refills: 0 | Status: SHIPPED | OUTPATIENT
Start: 2024-07-12 | End: 2024-07-17

## 2024-07-12 RX ADMIN — BUDESONIDE 500 MCG: 0.5 INHALANT RESPIRATORY (INHALATION) at 07:11

## 2024-07-12 RX ADMIN — ATORVASTATIN CALCIUM 80 MG: 80 TABLET, FILM COATED ORAL at 08:20

## 2024-07-12 RX ADMIN — HYDROXYZINE HYDROCHLORIDE 50 MG: 25 TABLET, FILM COATED ORAL at 08:38

## 2024-07-12 RX ADMIN — ARFORMOTEROL TARTRATE 15 MCG: 15 SOLUTION RESPIRATORY (INHALATION) at 07:11

## 2024-07-12 RX ADMIN — ALBUTEROL SULFATE 2.5 MG: 2.5 SOLUTION RESPIRATORY (INHALATION) at 03:46

## 2024-07-12 RX ADMIN — DOCUSATE SODIUM 100 MG: 100 CAPSULE, LIQUID FILLED ORAL at 08:19

## 2024-07-12 RX ADMIN — METOPROLOL TARTRATE 50 MG: 50 TABLET, FILM COATED ORAL at 08:19

## 2024-07-12 RX ADMIN — Medication 1 G: at 08:19

## 2024-07-12 RX ADMIN — CLONAZEPAM 0.5 MG: 0.5 TABLET ORAL at 08:18

## 2024-07-12 RX ADMIN — INSULIN LISPRO 6 UNITS: 100 INJECTION, SOLUTION INTRAVENOUS; SUBCUTANEOUS at 11:56

## 2024-07-12 RX ADMIN — GABAPENTIN 600 MG: 300 CAPSULE ORAL at 08:21

## 2024-07-12 RX ADMIN — CETIRIZINE HYDROCHLORIDE 10 MG: 10 TABLET, FILM COATED ORAL at 08:20

## 2024-07-12 RX ADMIN — FERROUS GLUCONATE 324 MG: 324 TABLET ORAL at 08:20

## 2024-07-12 RX ADMIN — DIVALPROEX SODIUM 500 MG: 500 TABLET, EXTENDED RELEASE ORAL at 08:23

## 2024-07-12 RX ADMIN — FAMOTIDINE 20 MG: 20 TABLET, FILM COATED ORAL at 08:21

## 2024-07-12 RX ADMIN — OXYCODONE HYDROCHLORIDE 5 MG: 5 TABLET ORAL at 03:53

## 2024-07-12 RX ADMIN — DICYCLOMINE HYDROCHLORIDE 20 MG: 20 TABLET ORAL at 08:24

## 2024-07-12 RX ADMIN — FENOFIBRATE 160 MG: 160 TABLET ORAL at 08:18

## 2024-07-12 RX ADMIN — EZETIMIBE 10 MG: 10 TABLET ORAL at 08:18

## 2024-07-12 RX ADMIN — RISPERIDONE 1 MG: 1 TABLET, FILM COATED ORAL at 08:23

## 2024-07-12 RX ADMIN — OXYCODONE HYDROCHLORIDE 5 MG: 5 TABLET ORAL at 11:55

## 2024-07-12 RX ADMIN — INSULIN GLARGINE 35 UNITS: 100 INJECTION, SOLUTION SUBCUTANEOUS at 08:25

## 2024-07-12 RX ADMIN — BUSPIRONE HYDROCHLORIDE 15 MG: 10 TABLET ORAL at 08:23

## 2024-07-12 RX ADMIN — ESCITALOPRAM OXALATE 15 MG: 10 TABLET ORAL at 08:21

## 2024-07-12 ASSESSMENT — PAIN DESCRIPTION - LOCATION: LOCATION: SACRUM

## 2024-07-12 ASSESSMENT — PAIN DESCRIPTION - DESCRIPTORS: DESCRIPTORS: ACHING

## 2024-07-12 ASSESSMENT — PAIN DESCRIPTION - ORIENTATION: ORIENTATION: MID

## 2024-07-12 ASSESSMENT — PAIN - FUNCTIONAL ASSESSMENT: PAIN_FUNCTIONAL_ASSESSMENT: PREVENTS OR INTERFERES SOME ACTIVE ACTIVITIES AND ADLS

## 2024-07-12 ASSESSMENT — PAIN SCALES - GENERAL: PAINLEVEL_OUTOF10: 3

## 2024-07-12 ASSESSMENT — PAIN DESCRIPTION - ONSET: ONSET: GRADUAL

## 2024-07-12 ASSESSMENT — PAIN DESCRIPTION - PAIN TYPE: TYPE: CHRONIC PAIN

## 2024-07-12 ASSESSMENT — PAIN DESCRIPTION - FREQUENCY: FREQUENCY: CONTINUOUS

## 2024-07-12 NOTE — CARE COORDINATION
Patient discharging to [facility] for long term care. Transportation arranged for 1300. Packet arranged and placed in soft chart. Patient aware and agrees with plan.     ROOM: 202   REPORT:[phone]    Lars ELY, KHLOE Puente

## 2024-07-12 NOTE — PROGRESS NOTES
No active GI bleeding on NM scan; Hgb improved to 8.6 this AM. MCV 79. BUN WNL. Please notify us if he begins to have GI bleeding again and/or drop in Hgb and if agreeable to colonoscopy. GI will sign off at this time, please reconsult as needed.     This is not a billable encounter.     YARI Thompson

## 2024-07-12 NOTE — PROGRESS NOTES
Slept well.  02 3liters.  Encouraged to keep turned.  Buttock wound redressed x 1.  No bleeding seen.  Medicated prn for pain x 1.  Bed alarm on and call light in reach.

## 2024-07-12 NOTE — DISCHARGE SUMMARY
Hospitalist Discharge Summary   Admit Date:  7/10/2024  1:26 PM   DC Note date: 2024  Name:  Bereket Dyson Jr.   Age:  44 y.o.  Sex:  male  :  1980   MRN:  [id number]   Room:  [facility]  PCP:  None, None    Presenting Complaint: Wound Check     Initial Admission Diagnosis: Anemia due to acute blood loss [D62]  Acute GI bleeding [K92.2]  Lower GI bleed [K92.2]     Problem List for this Hospitalization (present on admission):    Principal Problem:    Acute blood loss anemia  Active Problems:    Type 2 diabetes mellitus (HCC)    Pressure injury of skin of sacral region    Hx of right BKA (HCC)    Spina bifida (HCC)    Severe obesity (BMI 35.0-35.9 with comorbidity) (HCC)    Lower GI bleed    Hyponatremia  Resolved Problems:    * No resolved hospital problems. *      Hospital Course:  Bereket Dyson Jr. is a 44 y.o. male with medical history of spina bifida and diabetes who presented to the ED from [facility] on 7/10/2024 with cc of worsening bleeding over the last couple days from his rectum/sacral wound.  Labs showed sodium 127, potassium 5.2, WBC 10.9, and Hgb 7.4.  Chest x-ray showed no acute normalities.  CT abdomen pelvis showed prominent stool throughout the colon and right ischial ulcer with overlying bandage. He received 1L NS, general surgery was consulted, and he was admitted for further care.  General surgery and wound care given patient's ointment which did not have any active bleeding and did not appear infected.  GI was consulted and patient underwent nuclear medicine RBC on  that did not show any active GI bleeding.  Patient received 1 unit PRBC on . Hemoglobin improved following the transfusion and remained stable the next day.  Patient stable to discharge back to long-term care on 24.  Patient's aspirin 325 mg daily was stopped at the time of discharge.  Appears he has been on this as primary cardiovascular prevention but would recommend holding for at least the
